# Patient Record
Sex: FEMALE | Race: WHITE | NOT HISPANIC OR LATINO | Employment: OTHER | URBAN - METROPOLITAN AREA
[De-identification: names, ages, dates, MRNs, and addresses within clinical notes are randomized per-mention and may not be internally consistent; named-entity substitution may affect disease eponyms.]

---

## 2023-02-27 LAB
ALANINE AMINOTRANSFERASE (SGPT) (U/L) IN SER/PLAS: 16 U/L (ref 7–45)
ALBUMIN (G/DL) IN SER/PLAS: 4.5 G/DL (ref 3.4–5)
ALKALINE PHOSPHATASE (U/L) IN SER/PLAS: 73 U/L (ref 33–136)
ANION GAP IN SER/PLAS: 15 MMOL/L (ref 10–20)
ASPARTATE AMINOTRANSFERASE (SGOT) (U/L) IN SER/PLAS: 20 U/L (ref 9–39)
BASOPHILS (10*3/UL) IN BLOOD BY AUTOMATED COUNT: 0.02 X10E9/L (ref 0–0.1)
BASOPHILS/100 LEUKOCYTES IN BLOOD BY AUTOMATED COUNT: 0.3 % (ref 0–2)
BILIRUBIN TOTAL (MG/DL) IN SER/PLAS: 0.4 MG/DL (ref 0–1.2)
CALCIUM (MG/DL) IN SER/PLAS: 8.9 MG/DL (ref 8.6–10.3)
CARBON DIOXIDE, TOTAL (MMOL/L) IN SER/PLAS: 26 MMOL/L (ref 21–32)
CHLORIDE (MMOL/L) IN SER/PLAS: 101 MMOL/L (ref 98–107)
CREATININE (MG/DL) IN SER/PLAS: 0.92 MG/DL (ref 0.5–1.05)
EOSINOPHILS (10*3/UL) IN BLOOD BY AUTOMATED COUNT: 0.02 X10E9/L (ref 0–0.4)
EOSINOPHILS/100 LEUKOCYTES IN BLOOD BY AUTOMATED COUNT: 0.3 % (ref 0–6)
ERYTHROCYTE DISTRIBUTION WIDTH (RATIO) BY AUTOMATED COUNT: 13.4 % (ref 11.5–14.5)
ERYTHROCYTE MEAN CORPUSCULAR HEMOGLOBIN CONCENTRATION (G/DL) BY AUTOMATED: 32 G/DL (ref 32–36)
ERYTHROCYTE MEAN CORPUSCULAR VOLUME (FL) BY AUTOMATED COUNT: 96 FL (ref 80–100)
ERYTHROCYTES (10*6/UL) IN BLOOD BY AUTOMATED COUNT: 4.48 X10E12/L (ref 4–5.2)
GFR FEMALE: 64 ML/MIN/1.73M2
GLUCOSE (MG/DL) IN SER/PLAS: 96 MG/DL (ref 74–99)
HEMATOCRIT (%) IN BLOOD BY AUTOMATED COUNT: 42.8 % (ref 36–46)
HEMOGLOBIN (G/DL) IN BLOOD: 13.7 G/DL (ref 12–16)
IMMATURE GRANULOCYTES/100 LEUKOCYTES IN BLOOD BY AUTOMATED COUNT: 0.3 % (ref 0–0.9)
LEUKOCYTES (10*3/UL) IN BLOOD BY AUTOMATED COUNT: 7.8 X10E9/L (ref 4.4–11.3)
LIPASE (U/L) IN SER/PLAS: 59 U/L (ref 9–82)
LYMPHOCYTES (10*3/UL) IN BLOOD BY AUTOMATED COUNT: 1.44 X10E9/L (ref 0.8–3)
LYMPHOCYTES/100 LEUKOCYTES IN BLOOD BY AUTOMATED COUNT: 18.4 % (ref 13–44)
MONOCYTES (10*3/UL) IN BLOOD BY AUTOMATED COUNT: 0.96 X10E9/L (ref 0.05–0.8)
MONOCYTES/100 LEUKOCYTES IN BLOOD BY AUTOMATED COUNT: 12.2 % (ref 2–10)
NEUTROPHILS (10*3/UL) IN BLOOD BY AUTOMATED COUNT: 5.38 X10E9/L (ref 1.6–5.5)
NEUTROPHILS/100 LEUKOCYTES IN BLOOD BY AUTOMATED COUNT: 68.5 % (ref 40–80)
PLATELETS (10*3/UL) IN BLOOD AUTOMATED COUNT: 209 X10E9/L (ref 150–450)
POTASSIUM (MMOL/L) IN SER/PLAS: 3.8 MMOL/L (ref 3.5–5.3)
PROTEIN TOTAL: 7.1 G/DL (ref 6.4–8.2)
SODIUM (MMOL/L) IN SER/PLAS: 138 MMOL/L (ref 136–145)
UREA NITROGEN (MG/DL) IN SER/PLAS: 23 MG/DL (ref 6–23)

## 2023-03-15 DIAGNOSIS — M79.10 GENERALIZED MUSCLE ACHE: Primary | ICD-10-CM

## 2023-03-15 RX ORDER — TIZANIDINE 4 MG/1
TABLET ORAL
Qty: 180 TABLET | Refills: 1 | Status: SHIPPED | OUTPATIENT
Start: 2023-03-15 | End: 2023-06-08 | Stop reason: ALTCHOICE

## 2023-04-05 RX ORDER — METOPROLOL SUCCINATE 100 MG/1
1 TABLET, EXTENDED RELEASE ORAL DAILY
COMMUNITY
Start: 2022-05-28

## 2023-04-05 RX ORDER — VENLAFAXINE 75 MG/1
1 TABLET ORAL DAILY
COMMUNITY
Start: 2022-05-16 | End: 2023-08-28 | Stop reason: ALTCHOICE

## 2023-04-05 RX ORDER — SEMAGLUTIDE 1.34 MG/ML
0.5 INJECTION, SOLUTION SUBCUTANEOUS
COMMUNITY
End: 2023-05-24 | Stop reason: SDUPTHER

## 2023-04-05 RX ORDER — LIDOCAINE 50 MG/G
1 PATCH TOPICAL EVERY 12 HOURS
COMMUNITY
End: 2023-06-08 | Stop reason: SDUPTHER

## 2023-04-05 RX ORDER — ROSUVASTATIN CALCIUM 40 MG/1
1 TABLET, COATED ORAL NIGHTLY
COMMUNITY
Start: 2016-10-15 | End: 2024-04-15

## 2023-04-05 RX ORDER — OMEPRAZOLE 40 MG/1
40 CAPSULE, DELAYED RELEASE ORAL 2 TIMES DAILY
COMMUNITY
End: 2024-01-31

## 2023-04-05 RX ORDER — QUETIAPINE FUMARATE 25 MG/1
TABLET, FILM COATED ORAL
COMMUNITY
Start: 2023-02-27 | End: 2023-06-08 | Stop reason: SDUPTHER

## 2023-04-11 ENCOUNTER — TELEPHONE (OUTPATIENT)
Dept: PRIMARY CARE | Facility: CLINIC | Age: 78
End: 2023-04-11
Payer: MEDICARE

## 2023-04-17 ENCOUNTER — APPOINTMENT (OUTPATIENT)
Dept: PRIMARY CARE | Facility: CLINIC | Age: 78
End: 2023-04-17
Payer: MEDICARE

## 2023-05-24 ENCOUNTER — TELEPHONE (OUTPATIENT)
Dept: PRIMARY CARE | Facility: CLINIC | Age: 78
End: 2023-05-24
Payer: MEDICARE

## 2023-05-24 DIAGNOSIS — E11.69 TYPE 2 DIABETES MELLITUS WITH OTHER SPECIFIED COMPLICATION, WITHOUT LONG-TERM CURRENT USE OF INSULIN (MULTI): Primary | ICD-10-CM

## 2023-05-24 RX ORDER — SEMAGLUTIDE 0.68 MG/ML
0.5 INJECTION, SOLUTION SUBCUTANEOUS
Qty: 9 ML | Refills: 1 | Status: SHIPPED | OUTPATIENT
Start: 2023-05-24 | End: 2023-12-23

## 2023-06-08 ENCOUNTER — OFFICE VISIT (OUTPATIENT)
Dept: PRIMARY CARE | Facility: CLINIC | Age: 78
End: 2023-06-08
Payer: MEDICARE

## 2023-06-08 VITALS
BODY MASS INDEX: 24.16 KG/M2 | OXYGEN SATURATION: 100 % | HEART RATE: 67 BPM | WEIGHT: 145 LBS | SYSTOLIC BLOOD PRESSURE: 130 MMHG | HEIGHT: 65 IN | DIASTOLIC BLOOD PRESSURE: 70 MMHG

## 2023-06-08 DIAGNOSIS — M54.50 CHRONIC LOW BACK PAIN WITHOUT SCIATICA, UNSPECIFIED BACK PAIN LATERALITY: ICD-10-CM

## 2023-06-08 DIAGNOSIS — G47.00 INSOMNIA, UNSPECIFIED TYPE: ICD-10-CM

## 2023-06-08 DIAGNOSIS — J30.9 ALLERGIC RHINITIS, UNSPECIFIED SEASONALITY, UNSPECIFIED TRIGGER: ICD-10-CM

## 2023-06-08 DIAGNOSIS — E11.8 CONTROLLED TYPE 2 DIABETES MELLITUS WITH COMPLICATION, WITHOUT LONG-TERM CURRENT USE OF INSULIN (MULTI): ICD-10-CM

## 2023-06-08 DIAGNOSIS — I10 BENIGN ESSENTIAL HYPERTENSION: Primary | ICD-10-CM

## 2023-06-08 DIAGNOSIS — F39 MOOD DISORDER (CMS-HCC): ICD-10-CM

## 2023-06-08 DIAGNOSIS — G89.29 CHRONIC LOW BACK PAIN WITHOUT SCIATICA, UNSPECIFIED BACK PAIN LATERALITY: ICD-10-CM

## 2023-06-08 DIAGNOSIS — Z00.00 ROUTINE GENERAL MEDICAL EXAMINATION AT HEALTH CARE FACILITY: ICD-10-CM

## 2023-06-08 PROBLEM — K21.9 GASTROESOPHAGEAL REFLUX DISEASE: Status: ACTIVE | Noted: 2018-07-16

## 2023-06-08 PROBLEM — E53.8 B12 DEFICIENCY: Status: ACTIVE | Noted: 2023-06-08

## 2023-06-08 PROBLEM — R55 SYNCOPE: Status: ACTIVE | Noted: 2023-06-08

## 2023-06-08 PROBLEM — E11.9 DIABETES MELLITUS TYPE 2, CONTROLLED (MULTI): Status: ACTIVE | Noted: 2023-06-08

## 2023-06-08 PROBLEM — E78.5 HYPERLIPEMIA: Status: ACTIVE | Noted: 2023-06-08

## 2023-06-08 PROBLEM — F32.A DEPRESSION: Status: ACTIVE | Noted: 2023-06-08

## 2023-06-08 PROBLEM — F41.9 ANXIETY: Status: ACTIVE | Noted: 2023-06-08

## 2023-06-08 PROBLEM — E55.9 VITAMIN D DEFICIENCY: Status: ACTIVE | Noted: 2023-06-08

## 2023-06-08 PROCEDURE — 1170F FXNL STATUS ASSESSED: CPT | Performed by: STUDENT IN AN ORGANIZED HEALTH CARE EDUCATION/TRAINING PROGRAM

## 2023-06-08 PROCEDURE — 3078F DIAST BP <80 MM HG: CPT | Performed by: STUDENT IN AN ORGANIZED HEALTH CARE EDUCATION/TRAINING PROGRAM

## 2023-06-08 PROCEDURE — 3075F SYST BP GE 130 - 139MM HG: CPT | Performed by: STUDENT IN AN ORGANIZED HEALTH CARE EDUCATION/TRAINING PROGRAM

## 2023-06-08 PROCEDURE — 99214 OFFICE O/P EST MOD 30 MIN: CPT | Performed by: STUDENT IN AN ORGANIZED HEALTH CARE EDUCATION/TRAINING PROGRAM

## 2023-06-08 PROCEDURE — 1159F MED LIST DOCD IN RCRD: CPT | Performed by: STUDENT IN AN ORGANIZED HEALTH CARE EDUCATION/TRAINING PROGRAM

## 2023-06-08 PROCEDURE — G0439 PPPS, SUBSEQ VISIT: HCPCS | Performed by: STUDENT IN AN ORGANIZED HEALTH CARE EDUCATION/TRAINING PROGRAM

## 2023-06-08 PROCEDURE — 1036F TOBACCO NON-USER: CPT | Performed by: STUDENT IN AN ORGANIZED HEALTH CARE EDUCATION/TRAINING PROGRAM

## 2023-06-08 RX ORDER — FLUTICASONE PROPIONATE 50 MCG
2 SPRAY, SUSPENSION (ML) NASAL DAILY
Qty: 16 G | Refills: 5 | Status: SHIPPED | OUTPATIENT
Start: 2023-06-08 | End: 2023-08-28 | Stop reason: ALTCHOICE

## 2023-06-08 RX ORDER — ESOMEPRAZOLE MAGNESIUM 40 MG/1
CAPSULE, DELAYED RELEASE ORAL
COMMUNITY
End: 2023-08-28 | Stop reason: ALTCHOICE

## 2023-06-08 RX ORDER — LIDOCAINE 50 MG/G
1 PATCH TOPICAL DAILY
Qty: 30 PATCH | Refills: 2 | Status: SHIPPED | OUTPATIENT
Start: 2023-06-08 | End: 2023-10-18 | Stop reason: SDUPTHER

## 2023-06-08 RX ORDER — IPRATROPIUM BROMIDE 21 UG/1
2 SPRAY, METERED NASAL 2 TIMES DAILY
COMMUNITY
Start: 2023-03-29 | End: 2023-08-28 | Stop reason: ALTCHOICE

## 2023-06-08 RX ORDER — QUETIAPINE FUMARATE 25 MG/1
TABLET, FILM COATED ORAL
Qty: 90 TABLET | Refills: 1 | Status: SHIPPED | OUTPATIENT
Start: 2023-06-08 | End: 2023-08-28 | Stop reason: SDUPTHER

## 2023-06-08 ASSESSMENT — ACTIVITIES OF DAILY LIVING (ADL)
DOING_HOUSEWORK: INDEPENDENT
BATHING: INDEPENDENT
GROCERY_SHOPPING: INDEPENDENT
TAKING_MEDICATION: INDEPENDENT
DRESSING: INDEPENDENT
MANAGING_FINANCES: INDEPENDENT

## 2023-06-08 ASSESSMENT — PATIENT HEALTH QUESTIONNAIRE - PHQ9
1. LITTLE INTEREST OR PLEASURE IN DOING THINGS: NOT AT ALL
SUM OF ALL RESPONSES TO PHQ9 QUESTIONS 1 AND 2: 0
2. FEELING DOWN, DEPRESSED OR HOPELESS: NOT AT ALL

## 2023-06-08 NOTE — PROGRESS NOTES
"Subjective   Reason for Visit: Nevaeh Fair is an 77 y.o. female here for a Medicare Wellness visit.                 Hip pain has subsided since she started getting joint injections, pain has resolved     Pt also reports not taking any of her Effexor as she feels she doesn't need them a         Patient Care Team:  Beba Montgomery,  as PCP - General  Beba Montgomery DO as PCP - [employer] PCP     Review of Systems    Objective   Vitals:  /70   Pulse 67   Ht 1.651 m (5' 5\")   Wt 65.8 kg (145 lb)   SpO2 100%   BMI 24.13 kg/m²       Physical Exam  Constitutional:       Appearance: Normal appearance.   Cardiovascular:      Rate and Rhythm: Normal rate and regular rhythm.      Heart sounds: No murmur heard.  Pulmonary:      Effort: Pulmonary effort is normal. No respiratory distress.      Breath sounds: Normal breath sounds. No wheezing.   Musculoskeletal:      Cervical back: Neck supple.      Left lower leg: No edema.   Neurological:      Mental Status: She is alert.         Assessment/Plan   Problem List Items Addressed This Visit       Benign essential hypertension - Primary    Current Assessment & Plan       Physical exam was stable. Discussed maintaining diets such as DASH to help maintain normal Blood pressure. Encouraged regular exercise for a total of 120 min weekly. We will fu labs in 1-3 days. For now there will be no change in medical management. All questions and concerns were addressed. Pt will follow up in 4-6 months or sooner if needed.              Diabetes mellitus type 2, controlled (CMS/HCC)    Current Assessment & Plan     A1c 5.7%  Doing well            Mood disorder (CMS/HCC)    Lumbago    Relevant Medications    lidocaine (Lidoderm) 5 % patch     Other Visit Diagnoses       Insomnia, unspecified type        Relevant Medications    QUEtiapine (SEROquel) 25 mg tablet    Allergic rhinitis, unspecified seasonality, unspecified trigger        Relevant Medications    fluticasone " (Flonase) 50 mcg/actuation nasal spray    Other Relevant Orders    Respiratory Allergy Profile IgE    Routine general medical examination at health care facility

## 2023-07-05 ENCOUNTER — LAB (OUTPATIENT)
Dept: LAB | Facility: LAB | Age: 78
End: 2023-07-05
Payer: MEDICARE

## 2023-07-05 DIAGNOSIS — J30.9 ALLERGIC RHINITIS, UNSPECIFIED SEASONALITY, UNSPECIFIED TRIGGER: ICD-10-CM

## 2023-07-05 PROCEDURE — 86003 ALLG SPEC IGE CRUDE XTRC EA: CPT

## 2023-07-05 PROCEDURE — 36415 COLL VENOUS BLD VENIPUNCTURE: CPT

## 2023-07-05 PROCEDURE — 82785 ASSAY OF IGE: CPT

## 2023-07-06 LAB
ALLERGEN ANIMAL: CAT DANDER IGE (KU/L): <0.1 KU/L
ALLERGEN ANIMAL: DOG DANDER IGE (KU/L): <0.1 KU/L
ALLERGEN GRASS: BERMUDA GRASS (CYNODON DACTYLON) IGE (KU/L): <0.1 KU/L
ALLERGEN GRASS: JOHNSON GRASS (SORGHUM HALEPENSE) IGE (KU/L): <0.1 KU/L
ALLERGEN GRASS: MEADOW GRASS, KENTUCKY BLUE (POA PRATENSIS )IGE (KU/L): <0.1 KU/L
ALLERGEN GRASS: TIMOTHY GRASS (PHLEUM PRATENSE) IGE (KU/L): <0.1 KU/L
ALLERGEN INSECT: COCKROACH IGE: <0.1 KU/L
ALLERGEN MICROORGANISM: ALTERNARIA ALTERNATA IGE (KU/L): <0.1 KU/L
ALLERGEN MICROORGANISM: ASPERGILLUS FUMIGATUS IGE (KU/L): <0.1 KU/L
ALLERGEN MICROORGANISM: CLADOSPORIUM HERBARUM IGE (KU/L): <0.1 KU/L
ALLERGEN MICROORGANISM: PENICILLIUM CHRYSOGENUM (P. NOTATUM) IGE (KU/L): <0.1 KU/L
ALLERGEN MITE: DERMATOPHAGOIDES FARINAE (HOUSE DUST MITE) IGE (KU/L): <0.1 KU/L
ALLERGEN MITE: DERMATOPHAGOIDES PTERONYSSINUS (HOUSE DUST MITE) IGE (KU/L): <0.1 KU/L
ALLERGEN TREE: BOX-ELDER (ACER NEGUNDO) IGE (KU/L): <0.1 KU/L
ALLERGEN TREE: COMMON SILVER BIRCH (BETULA VERRUCOSA) IGE (KU/L): <0.1 KU/L
ALLERGEN TREE: COTTONWOOD (POPULUS DELTOIDES) IGE (KU/L): <0.1 KU/L
ALLERGEN TREE: ELM (ULMUS AMERICANA) IGE (KU/L): <0.1 KU/L
ALLERGEN TREE: MAPLE LEAF SYCAMORE, LONDON PLANE IGE (KU/L): <0.1 KU/L
ALLERGEN TREE: MOUNTAIN JUNIPER (JUNIPERUS SABINOIDES) IGE (KU/L): <0.1 KU/L
ALLERGEN TREE: MULBERRY (MORUS ALBA) IGE (KU/L): <0.1 KU/L
ALLERGEN TREE: OAK (QUERCUS ALBA) IGE (KU/L): <0.1 KU/L
ALLERGEN TREE: PECAN, HICKORY (CARYA PECAN) IGE (KU/L): <0.1 KU/L
ALLERGEN TREE: WALNUT IGE: <0.1 KU/L
ALLERGEN TREE: WHITE ASH (FRAXINUS AMERICANA) IGE (KU/L): <0.1 KU/L
ALLERGEN WEED: COMMON PIGWEED (AMARANTHUS RETROFLEXUS) IGE (KU/L): <0.1 KU/L
ALLERGEN WEED: COMMON RAGWEED (AMB. ARTEMISIIFOLIA/A. ELATIOR) IGE (KU/L): <0.1 KU/L
ALLERGEN WEED: GOOSEFOOT, LAMB'S QUARTERS (CHENOPODIUM ALBUM) IGE (KU/L): <0.1 KU/L
ALLERGEN WEED: PLANTAIN (ENGLISH), RIBWORT (PLANTAGO LANCEOLATA) IGE (KU/L): <0.1 KU/L
ALLERGEN WEED: PRICKLY SALTWORT/RUSSIAN THISTLE (SALSOLA KALI) IGE (KU/L): <0.1 KU/L
ALLERGEN WEED: SHEEP SORREL (RUMEX ACETOSELLA) IGE (KU/L): <0.1 KU/L
IMMUNOCAP IGE: 276 KU/L (ref 0–214)
IMMUNOCAP INTERPRETATION: ABNORMAL

## 2023-08-21 PROBLEM — E78.2 HYPERLIPEMIA, MIXED: Status: ACTIVE | Noted: 2023-08-21

## 2023-08-21 PROBLEM — G47.00 INSOMNIA: Status: ACTIVE | Noted: 2023-08-21

## 2023-08-21 PROBLEM — R55 NEAR SYNCOPE: Status: ACTIVE | Noted: 2018-07-16

## 2023-08-21 PROBLEM — R40.0 SOMNOLENCE: Status: ACTIVE | Noted: 2018-07-19

## 2023-08-21 PROBLEM — M25.559 JOINT PAIN, HIP: Status: ACTIVE | Noted: 2023-08-21

## 2023-08-21 PROBLEM — G25.2 ACTION TREMOR: Status: ACTIVE | Noted: 2023-08-21

## 2023-08-21 PROBLEM — R79.89 ELEVATED LFTS: Status: ACTIVE | Noted: 2018-07-16

## 2023-08-21 PROBLEM — R31.9 HEMATURIA: Status: ACTIVE | Noted: 2023-08-21

## 2023-08-21 PROBLEM — R41.3 MEMORY LOSS: Status: ACTIVE | Noted: 2018-07-16

## 2023-08-21 PROBLEM — M89.9 BONE DISORDER: Status: ACTIVE | Noted: 2023-08-21

## 2023-08-21 PROBLEM — R47.9 TRANSIENT SPEECH DISTURBANCE: Status: ACTIVE | Noted: 2023-08-21

## 2023-08-21 PROBLEM — R11.0 NAUSEA: Status: ACTIVE | Noted: 2023-08-21

## 2023-08-21 PROBLEM — R30.0 DYSURIA: Status: ACTIVE | Noted: 2023-08-21

## 2023-08-21 PROBLEM — I25.10 ARTERIOSCLEROSIS OF CORONARY ARTERY: Status: ACTIVE | Noted: 2018-07-16

## 2023-08-21 PROBLEM — M54.16 ACUTE LUMBAR RADICULOPATHY: Status: ACTIVE | Noted: 2023-08-21

## 2023-08-21 PROBLEM — N32.81 OVERACTIVE BLADDER: Status: ACTIVE | Noted: 2023-08-21

## 2023-08-21 PROBLEM — N39.0 RECURRENT UTI (URINARY TRACT INFECTION): Status: ACTIVE | Noted: 2023-08-21

## 2023-08-21 PROBLEM — E66.3 OVERWEIGHT: Status: ACTIVE | Noted: 2018-07-16

## 2023-08-21 PROBLEM — M54.9 BACK PAIN: Status: ACTIVE | Noted: 2023-08-21

## 2023-08-21 PROBLEM — E78.00 HYPERCHOLESTEREMIA: Status: ACTIVE | Noted: 2018-07-16

## 2023-08-21 PROBLEM — R74.8 INCREASED LIVER ENZYMES: Status: ACTIVE | Noted: 2023-08-21

## 2023-08-21 PROBLEM — Z96.1 PSEUDOPHAKIA OF BOTH EYES: Status: ACTIVE | Noted: 2023-08-21

## 2023-08-21 PROBLEM — R29.6 RECURRENT FALLS: Status: ACTIVE | Noted: 2023-08-21

## 2023-08-21 PROBLEM — R41.82 CHANGE IN MENTAL STATE: Status: ACTIVE | Noted: 2018-10-12

## 2023-08-21 PROBLEM — R05.9 COUGH: Status: ACTIVE | Noted: 2023-08-21

## 2023-08-21 PROBLEM — I10 HTN, GOAL BELOW 140/90: Status: ACTIVE | Noted: 2018-07-16

## 2023-08-21 PROBLEM — G25.0 ESSENTIAL TREMOR: Status: ACTIVE | Noted: 2018-07-19

## 2023-08-21 PROBLEM — R63.5 ABNORMAL WEIGHT GAIN: Status: ACTIVE | Noted: 2023-08-21

## 2023-08-21 PROBLEM — R73.03 PREDIABETES: Status: ACTIVE | Noted: 2018-07-16

## 2023-08-21 PROBLEM — R73.09 IMPAIRED GLUCOSE REGULATION: Status: ACTIVE | Noted: 2023-08-21

## 2023-08-21 PROBLEM — D51.9 VITAMIN B12 DEFICIENCY ANEMIA: Status: ACTIVE | Noted: 2023-08-21

## 2023-08-21 PROBLEM — J32.9 SINUSITIS: Status: ACTIVE | Noted: 2023-08-21

## 2023-08-21 PROBLEM — R42 DIZZINESS: Status: ACTIVE | Noted: 2023-08-21

## 2023-08-21 PROBLEM — N28.9 FUNCTION KIDNEY DECREASED: Status: ACTIVE | Noted: 2023-08-21

## 2023-08-21 PROBLEM — J34.89 RHINORRHEA: Status: ACTIVE | Noted: 2023-08-21

## 2023-08-21 RX ORDER — NITROGLYCERIN 0.4 MG/1
TABLET SUBLINGUAL
COMMUNITY

## 2023-08-21 RX ORDER — CARISOPRODOL 350 MG/1
350 TABLET ORAL NIGHTLY PRN
COMMUNITY
End: 2023-08-28 | Stop reason: ALTCHOICE

## 2023-08-21 RX ORDER — TIZANIDINE HYDROCHLORIDE 4 MG/1
CAPSULE, GELATIN COATED ORAL
COMMUNITY
End: 2023-08-28 | Stop reason: ALTCHOICE

## 2023-08-22 ENCOUNTER — APPOINTMENT (OUTPATIENT)
Dept: PRIMARY CARE | Facility: CLINIC | Age: 78
End: 2023-08-22
Payer: MEDICARE

## 2023-08-24 ENCOUNTER — APPOINTMENT (OUTPATIENT)
Dept: PRIMARY CARE | Facility: CLINIC | Age: 78
End: 2023-08-24
Payer: MEDICARE

## 2023-08-28 ENCOUNTER — OFFICE VISIT (OUTPATIENT)
Dept: PRIMARY CARE | Facility: CLINIC | Age: 78
End: 2023-08-28
Payer: MEDICARE

## 2023-08-28 VITALS
RESPIRATION RATE: 16 BRPM | SYSTOLIC BLOOD PRESSURE: 100 MMHG | BODY MASS INDEX: 24.69 KG/M2 | DIASTOLIC BLOOD PRESSURE: 58 MMHG | HEIGHT: 65 IN | WEIGHT: 148.2 LBS | OXYGEN SATURATION: 95 % | HEART RATE: 58 BPM

## 2023-08-28 DIAGNOSIS — G47.00 INSOMNIA, UNSPECIFIED TYPE: ICD-10-CM

## 2023-08-28 DIAGNOSIS — J30.9 ALLERGIC RHINITIS, UNSPECIFIED SEASONALITY, UNSPECIFIED TRIGGER: Primary | ICD-10-CM

## 2023-08-28 PROCEDURE — 1159F MED LIST DOCD IN RCRD: CPT | Performed by: STUDENT IN AN ORGANIZED HEALTH CARE EDUCATION/TRAINING PROGRAM

## 2023-08-28 PROCEDURE — 3078F DIAST BP <80 MM HG: CPT | Performed by: STUDENT IN AN ORGANIZED HEALTH CARE EDUCATION/TRAINING PROGRAM

## 2023-08-28 PROCEDURE — 1036F TOBACCO NON-USER: CPT | Performed by: STUDENT IN AN ORGANIZED HEALTH CARE EDUCATION/TRAINING PROGRAM

## 2023-08-28 PROCEDURE — 1160F RVW MEDS BY RX/DR IN RCRD: CPT | Performed by: STUDENT IN AN ORGANIZED HEALTH CARE EDUCATION/TRAINING PROGRAM

## 2023-08-28 PROCEDURE — 99213 OFFICE O/P EST LOW 20 MIN: CPT | Performed by: STUDENT IN AN ORGANIZED HEALTH CARE EDUCATION/TRAINING PROGRAM

## 2023-08-28 PROCEDURE — 3074F SYST BP LT 130 MM HG: CPT | Performed by: STUDENT IN AN ORGANIZED HEALTH CARE EDUCATION/TRAINING PROGRAM

## 2023-08-28 RX ORDER — TRAMADOL HYDROCHLORIDE 50 MG/1
50 TABLET ORAL EVERY 8 HOURS PRN
COMMUNITY
Start: 2023-08-22

## 2023-08-28 RX ORDER — CETIRIZINE HYDROCHLORIDE 10 MG/1
10 TABLET ORAL 2 TIMES DAILY
Qty: 60 TABLET | Refills: 2 | Status: SHIPPED | OUTPATIENT
Start: 2023-08-28 | End: 2023-08-31

## 2023-08-28 RX ORDER — FLUTICASONE PROPIONATE 50 MCG
1 SPRAY, SUSPENSION (ML) NASAL DAILY
Qty: 16 G | Refills: 5 | Status: SHIPPED | OUTPATIENT
Start: 2023-08-28 | End: 2023-12-11 | Stop reason: WASHOUT

## 2023-08-28 RX ORDER — QUETIAPINE FUMARATE 25 MG/1
TABLET, FILM COATED ORAL
Qty: 90 TABLET | Refills: 1 | Status: SHIPPED | OUTPATIENT
Start: 2023-08-28 | End: 2023-10-03

## 2023-08-28 ASSESSMENT — PATIENT HEALTH QUESTIONNAIRE - PHQ9
1. LITTLE INTEREST OR PLEASURE IN DOING THINGS: NOT AT ALL
2. FEELING DOWN, DEPRESSED OR HOPELESS: NOT AT ALL
SUM OF ALL RESPONSES TO PHQ9 QUESTIONS 1 AND 2: 0

## 2023-08-28 NOTE — PROGRESS NOTES
Subjective   Patient ID: Nevaeh Fair is a 77 y.o. female who presents for Allergies (Pt is her to discuss her allergies.).  Patient presents today for further evaluation of her allergies.  Patient states that over the last couple months her allergies have been getting very bad.  She is having chronic rhinorrhea as well as congestion.  We have tried several antihistamines along with nasal sprays which have had little effect.  She has been taking 4 Benadryl daily without success as well.  We did do IgE testing as a respiratory panel which turned out to be very positive but no specific allergen was identified                  Objective   Physical Exam  Vitals reviewed.   Constitutional:       Appearance: Normal appearance.   Cardiovascular:      Rate and Rhythm: Normal rate and regular rhythm.      Heart sounds: No murmur heard.  Pulmonary:      Effort: Pulmonary effort is normal. No respiratory distress.      Breath sounds: Normal breath sounds. No wheezing.   Musculoskeletal:      Cervical back: Neck supple.      Left lower leg: No edema.   Neurological:      Mental Status: She is alert.       Assessment/Plan   Diagnoses and all orders for this visit:  Allergic rhinitis, unspecified seasonality, unspecified trigger  -     cetirizine (ZyrTEC) 10 mg tablet; Take 1 tablet (10 mg) by mouth 2 times a day.  -     fluticasone (Flonase) 50 mcg/actuation nasal spray; Administer 1 spray into each nostril once daily. Shake gently. Before first use, prime pump. After use, clean tip and replace cap.  Insomnia, unspecified type  -     QUEtiapine (SEROquel) 25 mg tablet; Use 2  tablet at night time

## 2023-08-31 DIAGNOSIS — J30.9 ALLERGIC RHINITIS, UNSPECIFIED SEASONALITY, UNSPECIFIED TRIGGER: ICD-10-CM

## 2023-08-31 RX ORDER — CETIRIZINE HYDROCHLORIDE 10 MG/1
10 TABLET ORAL 2 TIMES DAILY
Qty: 180 TABLET | Refills: 1 | Status: SHIPPED | OUTPATIENT
Start: 2023-08-31 | End: 2023-12-11 | Stop reason: ALTCHOICE

## 2023-09-27 ENCOUNTER — TELEPHONE (OUTPATIENT)
Dept: PRIMARY CARE | Facility: CLINIC | Age: 78
End: 2023-09-27
Payer: MEDICARE

## 2023-10-23 ENCOUNTER — TELEPHONE (OUTPATIENT)
Dept: ALLERGY | Facility: CLINIC | Age: 78
End: 2023-10-23
Payer: MEDICARE

## 2023-10-30 ENCOUNTER — APPOINTMENT (OUTPATIENT)
Dept: ALLERGY | Facility: CLINIC | Age: 78
End: 2023-10-30
Payer: MEDICARE

## 2023-11-03 ENCOUNTER — APPOINTMENT (OUTPATIENT)
Dept: ALLERGY | Facility: CLINIC | Age: 78
End: 2023-11-03
Payer: MEDICARE

## 2023-12-11 ENCOUNTER — OFFICE VISIT (OUTPATIENT)
Dept: PRIMARY CARE | Facility: CLINIC | Age: 78
End: 2023-12-11
Payer: MEDICARE

## 2023-12-11 VITALS
WEIGHT: 138 LBS | BODY MASS INDEX: 22.96 KG/M2 | HEART RATE: 70 BPM | OXYGEN SATURATION: 96 % | SYSTOLIC BLOOD PRESSURE: 110 MMHG | DIASTOLIC BLOOD PRESSURE: 56 MMHG

## 2023-12-11 DIAGNOSIS — M54.50 CHRONIC LOW BACK PAIN WITHOUT SCIATICA, UNSPECIFIED BACK PAIN LATERALITY: ICD-10-CM

## 2023-12-11 DIAGNOSIS — I10 HTN, GOAL BELOW 140/90: Primary | ICD-10-CM

## 2023-12-11 DIAGNOSIS — G89.29 CHRONIC LOW BACK PAIN WITHOUT SCIATICA, UNSPECIFIED BACK PAIN LATERALITY: ICD-10-CM

## 2023-12-11 PROBLEM — J32.9 SINUSITIS: Status: RESOLVED | Noted: 2023-08-21 | Resolved: 2023-12-11

## 2023-12-11 PROCEDURE — 99214 OFFICE O/P EST MOD 30 MIN: CPT | Performed by: STUDENT IN AN ORGANIZED HEALTH CARE EDUCATION/TRAINING PROGRAM

## 2023-12-11 PROCEDURE — 3074F SYST BP LT 130 MM HG: CPT | Performed by: STUDENT IN AN ORGANIZED HEALTH CARE EDUCATION/TRAINING PROGRAM

## 2023-12-11 PROCEDURE — 3078F DIAST BP <80 MM HG: CPT | Performed by: STUDENT IN AN ORGANIZED HEALTH CARE EDUCATION/TRAINING PROGRAM

## 2023-12-11 PROCEDURE — 1159F MED LIST DOCD IN RCRD: CPT | Performed by: STUDENT IN AN ORGANIZED HEALTH CARE EDUCATION/TRAINING PROGRAM

## 2023-12-11 PROCEDURE — 1160F RVW MEDS BY RX/DR IN RCRD: CPT | Performed by: STUDENT IN AN ORGANIZED HEALTH CARE EDUCATION/TRAINING PROGRAM

## 2023-12-11 PROCEDURE — 1036F TOBACCO NON-USER: CPT | Performed by: STUDENT IN AN ORGANIZED HEALTH CARE EDUCATION/TRAINING PROGRAM

## 2023-12-11 RX ORDER — LIDOCAINE 50 MG/G
1 PATCH TOPICAL DAILY
Qty: 30 PATCH | Refills: 0 | Status: SHIPPED | OUTPATIENT
Start: 2023-12-11 | End: 2024-01-31

## 2023-12-11 ASSESSMENT — PATIENT HEALTH QUESTIONNAIRE - PHQ9
2. FEELING DOWN, DEPRESSED OR HOPELESS: NOT AT ALL
SUM OF ALL RESPONSES TO PHQ9 QUESTIONS 1 AND 2: 0
1. LITTLE INTEREST OR PLEASURE IN DOING THINGS: NOT AT ALL

## 2023-12-11 NOTE — PROGRESS NOTES
Subjective   Patient ID: Nevaeh Fair is a 77 y.o. female who presents for Follow-up (Refill on lidocaine patches).    Pt is currently not taking her BP medications due to rhinorrhea.   Started taking a probiotic due to the intermittent nausea         Objective   Physical Exam  Vitals reviewed.   Constitutional:       Appearance: Normal appearance.   Cardiovascular:      Rate and Rhythm: Normal rate and regular rhythm.      Heart sounds: No murmur heard.  Pulmonary:      Effort: Pulmonary effort is normal. No respiratory distress.      Breath sounds: Normal breath sounds. No wheezing.   Musculoskeletal:      Cervical back: Neck supple.      Left lower leg: No edema.   Neurological:      Mental Status: She is alert.       Assessment/Plan   Problem List Items Addressed This Visit             ICD-10-CM    HTN, goal below 140/90 - Primary I10    Lumbago M54.50    Relevant Medications    lidocaine (Lidoderm) 5 % patch   Nevaeh Fair is a 77 year old female who presents to the office for 6 month follow up.       Type 2 Diabetes today  A1c 5.7%  worsening, start ozempic 0.5mg daily   sample given  - Recommended healthy diet- Mediterranean diet  - Recommended increased physical activity   - Limit alcohol consumption  - NO smoking   - Check your blood glucose readings before meals and at bedtime  - Please let us know if you have hypoglycemic symptoms such as tremor, dizziness, weakness, drowsiness, and confusion or altered mental status, palpitations and sweating and also blood sugar less than 70  - hemoglobin A1c trend is 5.8  - See ophthalmologist yearly base for retinopathy check   - Medications: aspirin 81 mg daily, rosuvastatin 40 mg daily, DC'd Metformin due to better A1c. She will continue with lifestyle modification        B12 Defiencey   severe fatigue and peripheral neuropathy   B12 1000mcg given in office      Rhinorrhea   start ipratropium BID      Anxiety/Insomnia/Depression -please follow Sleep hygiene  instruction  Venlafaxine 75mg daily   would llike to restart ramelteon 8mg at bedtime- rx sent   Sleep as long as necessary to feel rested (usually seven to eight hours for adults) and then get out of bed  Maintain a regular sleep schedule, particularly a regular wake-up time in the morning  Try not to force sleep  -refilled ramelteon 8mg daily   -Off Xanax and Valium     Hypertension/coronary artery disease and 3 stent on 2015  Pt self discontinued  metoprolol 50 daily   good control 110/56mmHg  - Sodium restriction 2400 mg per day  - seeing Cardiology- Dr. Reeves      Chronic Back pain 2/2 lumbar herniated disk   - s/p multiple injection  refilled tizanidine 4mg BID  - seeing orthopedist -Dr. Fuchs- low back pain, hip pain, has been getting shots hip/back  - lidocaine patch refilled     Essential tremor-not on medication  - Neurology- Dr. Ventura- tremor, memory loss- MRI, EEG done in 2018. She no longer follows up.  refilled tizanidine      GERD  refilled omeprazole 40mg daily       Health maintenance and Preventive Health Screening  - Colonoscopy:8/15/13 repeat 10 years, pt declines further testing  - Mammogram:Not interested  - Dexa Scan:Not interested               Beba Montgomery DO 12/11/23 1:05 PM

## 2024-01-09 DIAGNOSIS — G47.00 INSOMNIA, UNSPECIFIED TYPE: ICD-10-CM

## 2024-01-09 RX ORDER — QUETIAPINE FUMARATE 25 MG/1
TABLET, FILM COATED ORAL
Qty: 90 TABLET | Refills: 1 | Status: SHIPPED | OUTPATIENT
Start: 2024-01-09 | End: 2024-05-30

## 2024-01-16 ENCOUNTER — TELEPHONE (OUTPATIENT)
Dept: ALLERGY | Facility: CLINIC | Age: 79
End: 2024-01-16
Payer: MEDICARE

## 2024-01-23 ENCOUNTER — APPOINTMENT (OUTPATIENT)
Dept: ALLERGY | Facility: CLINIC | Age: 79
End: 2024-01-23
Payer: MEDICARE

## 2024-01-31 DIAGNOSIS — G47.00 INSOMNIA, UNSPECIFIED TYPE: ICD-10-CM

## 2024-01-31 DIAGNOSIS — M54.50 CHRONIC LOW BACK PAIN WITHOUT SCIATICA, UNSPECIFIED BACK PAIN LATERALITY: ICD-10-CM

## 2024-01-31 DIAGNOSIS — K21.9 GASTRO-ESOPHAGEAL REFLUX DISEASE WITHOUT ESOPHAGITIS: ICD-10-CM

## 2024-01-31 DIAGNOSIS — G89.29 CHRONIC LOW BACK PAIN WITHOUT SCIATICA, UNSPECIFIED BACK PAIN LATERALITY: ICD-10-CM

## 2024-01-31 RX ORDER — QUETIAPINE FUMARATE 25 MG/1
TABLET, FILM COATED ORAL
Qty: 180 TABLET | OUTPATIENT
Start: 2024-01-31

## 2024-01-31 RX ORDER — OMEPRAZOLE 40 MG/1
40 CAPSULE, DELAYED RELEASE ORAL 2 TIMES DAILY
Qty: 180 CAPSULE | Refills: 1 | Status: SHIPPED | OUTPATIENT
Start: 2024-01-31

## 2024-01-31 RX ORDER — LIDOCAINE 50 MG/G
PATCH TOPICAL
Qty: 30 PATCH | Refills: 3 | Status: SHIPPED | OUTPATIENT
Start: 2024-01-31 | End: 2024-02-01 | Stop reason: SDUPTHER

## 2024-02-05 RX ORDER — LIDOCAINE 50 MG/G
1 PATCH TOPICAL DAILY
Qty: 30 PATCH | Refills: 1 | Status: SHIPPED | OUTPATIENT
Start: 2024-02-05 | End: 2024-06-10 | Stop reason: WASHOUT

## 2024-02-08 ENCOUNTER — APPOINTMENT (OUTPATIENT)
Dept: PRIMARY CARE | Facility: CLINIC | Age: 79
End: 2024-02-08
Payer: MEDICARE

## 2024-04-09 ENCOUNTER — APPOINTMENT (OUTPATIENT)
Dept: PRIMARY CARE | Facility: CLINIC | Age: 79
End: 2024-04-09
Payer: MEDICARE

## 2024-04-09 ENCOUNTER — TELEPHONE (OUTPATIENT)
Dept: PRIMARY CARE | Facility: CLINIC | Age: 79
End: 2024-04-09

## 2024-04-10 ENCOUNTER — TELEPHONE (OUTPATIENT)
Dept: PRIMARY CARE | Facility: CLINIC | Age: 79
End: 2024-04-10
Payer: MEDICARE

## 2024-04-15 DIAGNOSIS — E78.2 MIXED HYPERLIPIDEMIA: ICD-10-CM

## 2024-04-15 RX ORDER — ROSUVASTATIN CALCIUM 40 MG/1
40 TABLET, COATED ORAL NIGHTLY
Qty: 90 TABLET | Refills: 0 | Status: SHIPPED | OUTPATIENT
Start: 2024-04-15

## 2024-04-18 DIAGNOSIS — E11.69 TYPE 2 DIABETES MELLITUS WITH OTHER SPECIFIED COMPLICATION, WITHOUT LONG-TERM CURRENT USE OF INSULIN (MULTI): ICD-10-CM

## 2024-04-18 RX ORDER — SEMAGLUTIDE 0.68 MG/ML
0.5 INJECTION, SOLUTION SUBCUTANEOUS
Qty: 9 ML | Refills: 1 | Status: SHIPPED | OUTPATIENT
Start: 2024-04-21

## 2024-05-30 DIAGNOSIS — G47.00 INSOMNIA, UNSPECIFIED TYPE: ICD-10-CM

## 2024-05-30 RX ORDER — QUETIAPINE FUMARATE 25 MG/1
TABLET, FILM COATED ORAL
Qty: 90 TABLET | Refills: 1 | Status: SHIPPED | OUTPATIENT
Start: 2024-05-30 | End: 2024-06-10 | Stop reason: DRUGHIGH

## 2024-06-10 ENCOUNTER — OFFICE VISIT (OUTPATIENT)
Dept: PRIMARY CARE | Facility: CLINIC | Age: 79
End: 2024-06-10
Payer: MEDICARE

## 2024-06-10 VITALS
HEART RATE: 55 BPM | WEIGHT: 144.4 LBS | RESPIRATION RATE: 15 BRPM | BODY MASS INDEX: 24.06 KG/M2 | HEIGHT: 65 IN | OXYGEN SATURATION: 99 %

## 2024-06-10 DIAGNOSIS — H35.3231 EXUDATIVE AGE-RELATED MACULAR DEGENERATION, BILATERAL, WITH ACTIVE CHOROIDAL NEOVASCULARIZATION (MULTI): ICD-10-CM

## 2024-06-10 DIAGNOSIS — E74.89 DISORDER OF GLUCOSE METABOLISM (MULTI): ICD-10-CM

## 2024-06-10 DIAGNOSIS — I10 HTN, GOAL BELOW 140/90: Primary | ICD-10-CM

## 2024-06-10 DIAGNOSIS — G47.00 INSOMNIA, UNSPECIFIED TYPE: ICD-10-CM

## 2024-06-10 DIAGNOSIS — F39 MOOD DISORDER (CMS-HCC): ICD-10-CM

## 2024-06-10 DIAGNOSIS — E11.8 CONTROLLED TYPE 2 DIABETES MELLITUS WITH COMPLICATION, WITHOUT LONG-TERM CURRENT USE OF INSULIN (MULTI): ICD-10-CM

## 2024-06-10 PROCEDURE — 1160F RVW MEDS BY RX/DR IN RCRD: CPT | Performed by: STUDENT IN AN ORGANIZED HEALTH CARE EDUCATION/TRAINING PROGRAM

## 2024-06-10 PROCEDURE — 99214 OFFICE O/P EST MOD 30 MIN: CPT | Performed by: STUDENT IN AN ORGANIZED HEALTH CARE EDUCATION/TRAINING PROGRAM

## 2024-06-10 PROCEDURE — 1123F ACP DISCUSS/DSCN MKR DOCD: CPT | Performed by: STUDENT IN AN ORGANIZED HEALTH CARE EDUCATION/TRAINING PROGRAM

## 2024-06-10 PROCEDURE — 1159F MED LIST DOCD IN RCRD: CPT | Performed by: STUDENT IN AN ORGANIZED HEALTH CARE EDUCATION/TRAINING PROGRAM

## 2024-06-10 PROCEDURE — G2211 COMPLEX E/M VISIT ADD ON: HCPCS | Performed by: STUDENT IN AN ORGANIZED HEALTH CARE EDUCATION/TRAINING PROGRAM

## 2024-06-10 RX ORDER — QUETIAPINE FUMARATE 50 MG/1
50 TABLET, FILM COATED ORAL NIGHTLY
Qty: 90 TABLET | Refills: 1 | Status: SHIPPED | OUTPATIENT
Start: 2024-06-10 | End: 2024-12-07

## 2024-06-10 NOTE — PROGRESS NOTES
"Subjective   Patient ID: Nevaeh Fair is a 78 y.o. female who presents for Follow-up (Pt is here for her 3 month follow up. /74.).  HPI     Interval Hx:  Pt stopped her metoprolol medications 4-5 months ago due to rhinorrhea. She followed up with cardiologist last week but did not inform them.   EKG and carotid artery screenings were stable.   Stress testing pending    Pt admits to having were jaw feeling \"funny\" feeling were she wants to open her jaw     Back Pain  Followed with Dr. Alvarado   S/p facet block ablation   S/p nsaids, tylenol, neuropathic agents, muscle relaxants and physical therapy or spinal manipulation Trials   Planning for possible ablation     Objective   Physical Exam  Vitals reviewed.   Constitutional:       Appearance: Normal appearance.   Cardiovascular:      Rate and Rhythm: Normal rate and regular rhythm.      Heart sounds: No murmur heard.  Pulmonary:      Effort: Pulmonary effort is normal. No respiratory distress.      Breath sounds: Normal breath sounds. No wheezing.   Musculoskeletal:      Cervical back: Neck supple.      Left lower leg: No edema.   Neurological:      Mental Status: She is alert.         Assessment/Plan   Nevaeh Fair is a 78 year old female who presents to the office for 6 month follow up.       Type 2 Diabetes today  A1C ordered  - Recommended healthy diet- Mediterranean diet  - Recommended increased physical activity   - Limit alcohol consumption  - NO smoking   - Check your blood glucose readings before meals and at bedtime  - Please let us know if you have hypoglycemic symptoms such as tremor, dizziness, weakness, drowsiness, and confusion or altered mental status, palpitations and sweating and also blood sugar less than 70  - See ophthalmologist yearly base for retinopathy check   - Medications: aspirin 81 mg daily, rosuvastatin 40 mg daily, DC'd Metformin due to better A1c. She will continue with lifestyle modification     B12 Defiencey   severe fatigue " and peripheral neuropathy   B12 1000mcg supplementation     Rhinorrhea   BB related. Pt self stopped her BP medications   Resolved      Anxiety/Insomnia/Depression -please follow Sleep hygiene instruction  Venlafaxine 75mg daily   Sleep as long as necessary to feel rested (usually seven to eight hours for adults) and then get out of bed  Maintain a regular sleep schedule, particularly a regular wake-up time in the morning  Try not to force sleep  -Seroquel 50mg nightly for her insomnia   -Previous medication, remelteon, Xanax and Valium     Hypertension/coronary artery disease and 3 stent on 2015  Pt self discontinued  metoprolol 50 daily   good control 110/56mmHg  - Sodium restriction 2400 mg per day  - seeing Cardiology- Dr. Reeves      Chronic Back pain 2/2 lumbar herniated disk   - s/p multiple injection  refilled tizanidine 4mg BID  - seeing orthopedist -Dr. Fuchs- low back pain, hip pain, has been getting shots hip/back  - lidocaine patch refilled     Essential tremor-not on medication  - Neurology- Dr. Ventura- tremor, memory loss- MRI, EEG done in 2018. She no longer follows up.  refilled tizanidine      GERD  refilled omeprazole 40mg daily       Health maintenance and Preventive Health Screening  - Colonoscopy:8/15/13 repeat 10 years, pt declines further testing  - Mammogram:Not interested  - Dexa Scan:Not interested       Beba Montgomery DO 06/10/24 12:42 PM

## 2024-06-18 ENCOUNTER — LAB (OUTPATIENT)
Dept: LAB | Facility: LAB | Age: 79
End: 2024-06-18
Payer: MEDICARE

## 2024-06-18 ENCOUNTER — OFFICE VISIT (OUTPATIENT)
Dept: PRIMARY CARE | Facility: CLINIC | Age: 79
End: 2024-06-18
Payer: MEDICARE

## 2024-06-18 VITALS
SYSTOLIC BLOOD PRESSURE: 128 MMHG | WEIGHT: 145.6 LBS | HEIGHT: 65 IN | OXYGEN SATURATION: 98 % | RESPIRATION RATE: 16 BRPM | BODY MASS INDEX: 24.26 KG/M2 | HEART RATE: 57 BPM | DIASTOLIC BLOOD PRESSURE: 70 MMHG

## 2024-06-18 DIAGNOSIS — E11.8 CONTROLLED TYPE 2 DIABETES MELLITUS WITH COMPLICATION, WITHOUT LONG-TERM CURRENT USE OF INSULIN (MULTI): ICD-10-CM

## 2024-06-18 DIAGNOSIS — R10.9 ABDOMINAL CRAMPING: Primary | ICD-10-CM

## 2024-06-18 DIAGNOSIS — I10 HTN, GOAL BELOW 140/90: ICD-10-CM

## 2024-06-18 LAB
ALBUMIN SERPL BCP-MCNC: 4.1 G/DL (ref 3.4–5)
ALP SERPL-CCNC: 75 U/L (ref 33–136)
ALT SERPL W P-5'-P-CCNC: 24 U/L (ref 7–45)
ANION GAP SERPL CALC-SCNC: 14 MMOL/L (ref 10–20)
AST SERPL W P-5'-P-CCNC: 15 U/L (ref 9–39)
BASOPHILS # BLD AUTO: 0.03 X10*3/UL (ref 0–0.1)
BASOPHILS NFR BLD AUTO: 0.4 %
BILIRUB SERPL-MCNC: 0.5 MG/DL (ref 0–1.2)
BUN SERPL-MCNC: 22 MG/DL (ref 6–23)
CALCIUM SERPL-MCNC: 8.9 MG/DL (ref 8.6–10.3)
CHLORIDE SERPL-SCNC: 106 MMOL/L (ref 98–107)
CO2 SERPL-SCNC: 26 MMOL/L (ref 21–32)
CREAT SERPL-MCNC: 0.7 MG/DL (ref 0.5–1.05)
EGFRCR SERPLBLD CKD-EPI 2021: 88 ML/MIN/1.73M*2
EOSINOPHIL # BLD AUTO: 0.06 X10*3/UL (ref 0–0.4)
EOSINOPHIL NFR BLD AUTO: 0.9 %
ERYTHROCYTE [DISTWIDTH] IN BLOOD BY AUTOMATED COUNT: 14.6 % (ref 11.5–14.5)
GLUCOSE SERPL-MCNC: 94 MG/DL (ref 74–99)
HCT VFR BLD AUTO: 41.1 % (ref 36–46)
HGB BLD-MCNC: 13.3 G/DL (ref 12–16)
IMM GRANULOCYTES # BLD AUTO: 0.02 X10*3/UL (ref 0–0.5)
IMM GRANULOCYTES NFR BLD AUTO: 0.3 % (ref 0–0.9)
LYMPHOCYTES # BLD AUTO: 2.78 X10*3/UL (ref 0.8–3)
LYMPHOCYTES NFR BLD AUTO: 39.5 %
MCH RBC QN AUTO: 31.4 PG (ref 26–34)
MCHC RBC AUTO-ENTMCNC: 32.4 G/DL (ref 32–36)
MCV RBC AUTO: 97 FL (ref 80–100)
MONOCYTES # BLD AUTO: 0.41 X10*3/UL (ref 0.05–0.8)
MONOCYTES NFR BLD AUTO: 5.8 %
NEUTROPHILS # BLD AUTO: 3.73 X10*3/UL (ref 1.6–5.5)
NEUTROPHILS NFR BLD AUTO: 53.1 %
PLATELET # BLD AUTO: 173 X10*3/UL (ref 150–450)
PMV BLD AUTO: 9.2 FL (ref 7.5–11.5)
POTASSIUM SERPL-SCNC: 3.8 MMOL/L (ref 3.5–5.3)
PROT SERPL-MCNC: 6.5 G/DL (ref 6.4–8.2)
RBC # BLD AUTO: 4.23 X10*6/UL (ref 4–5.2)
SODIUM SERPL-SCNC: 142 MMOL/L (ref 136–145)
TSH SERPL-ACNC: 1.04 MIU/L (ref 0.44–3.98)
WBC # BLD AUTO: 7 X10*3/UL (ref 4.4–11.3)

## 2024-06-18 PROCEDURE — 1159F MED LIST DOCD IN RCRD: CPT | Performed by: STUDENT IN AN ORGANIZED HEALTH CARE EDUCATION/TRAINING PROGRAM

## 2024-06-18 PROCEDURE — 84443 ASSAY THYROID STIM HORMONE: CPT

## 2024-06-18 PROCEDURE — 1123F ACP DISCUSS/DSCN MKR DOCD: CPT | Performed by: STUDENT IN AN ORGANIZED HEALTH CARE EDUCATION/TRAINING PROGRAM

## 2024-06-18 PROCEDURE — 3078F DIAST BP <80 MM HG: CPT | Performed by: STUDENT IN AN ORGANIZED HEALTH CARE EDUCATION/TRAINING PROGRAM

## 2024-06-18 PROCEDURE — 3074F SYST BP LT 130 MM HG: CPT | Performed by: STUDENT IN AN ORGANIZED HEALTH CARE EDUCATION/TRAINING PROGRAM

## 2024-06-18 PROCEDURE — 83036 HEMOGLOBIN GLYCOSYLATED A1C: CPT

## 2024-06-18 PROCEDURE — 99213 OFFICE O/P EST LOW 20 MIN: CPT | Performed by: STUDENT IN AN ORGANIZED HEALTH CARE EDUCATION/TRAINING PROGRAM

## 2024-06-18 PROCEDURE — 80053 COMPREHEN METABOLIC PANEL: CPT

## 2024-06-18 PROCEDURE — 85025 COMPLETE CBC W/AUTO DIFF WBC: CPT

## 2024-06-18 PROCEDURE — 1036F TOBACCO NON-USER: CPT | Performed by: STUDENT IN AN ORGANIZED HEALTH CARE EDUCATION/TRAINING PROGRAM

## 2024-06-18 PROCEDURE — 36415 COLL VENOUS BLD VENIPUNCTURE: CPT

## 2024-06-18 RX ORDER — DICYCLOMINE HYDROCHLORIDE 10 MG/1
10 CAPSULE ORAL 4 TIMES DAILY PRN
Qty: 30 CAPSULE | Refills: 0 | Status: SHIPPED | OUTPATIENT
Start: 2024-06-18 | End: 2024-08-17

## 2024-06-18 ASSESSMENT — ENCOUNTER SYMPTOMS: ABDOMINAL PAIN: 1

## 2024-06-18 NOTE — H&P (VIEW-ONLY)
Subjective   Patient ID: Nevaeh Fair is a 78 y.o. female who presents for Abdominal Pain (Pt is here for stomach pain for the last couple of weeks.).    Thought she was lactose intolerant   She ate steak, she gets severe abdominal pain vs.     Pt is having anginal symptoms relieved with her nitroglycerin   Central sternal pain   Has happened twice       Abdominal Pain  This is a new problem. The current episode started 1 to 4 weeks ago. The onset quality is sudden. The problem occurs daily. The problem has been gradually worsening. The pain is moderate. The quality of the pain is colicky.       Review of Systems   Gastrointestinal:  Positive for abdominal pain.       Objective   Physical Exam  Vitals reviewed.   Constitutional:       Appearance: Normal appearance.   Cardiovascular:      Rate and Rhythm: Normal rate and regular rhythm.      Heart sounds: No murmur heard.  Pulmonary:      Effort: Pulmonary effort is normal. No respiratory distress.      Breath sounds: Normal breath sounds. No wheezing.   Musculoskeletal:      Cervical back: Neck supple.      Left lower leg: No edema.   Neurological:      Mental Status: She is alert.         Assessment/Plan   Abdominal Pain   Colicky pain with food making it worse   Bentyl with food   H.pylori testing   Consider starting FODMAP diet     RTC in 1-2 months if symptoms persist or worsen as needed        Beba Montgomery, DO 06/18/24 3:03 PM

## 2024-06-19 LAB
EST. AVERAGE GLUCOSE BLD GHB EST-MCNC: 128 MG/DL
HBA1C MFR BLD: 6.1 %

## 2024-06-29 PROBLEM — H35.3231 EXUDATIVE AGE-RELATED MACULAR DEGENERATION, BILATERAL, WITH ACTIVE CHOROIDAL NEOVASCULARIZATION (MULTI): Status: ACTIVE | Noted: 2024-06-29

## 2024-06-29 PROBLEM — E74.89 DISORDER OF GLUCOSE METABOLISM (MULTI): Status: ACTIVE | Noted: 2023-08-21

## 2024-07-01 DIAGNOSIS — R10.9 ABDOMINAL CRAMPING: ICD-10-CM

## 2024-07-01 RX ORDER — DICYCLOMINE HYDROCHLORIDE 10 MG/1
10 CAPSULE ORAL 4 TIMES DAILY PRN
Qty: 30 CAPSULE | Refills: 0 | Status: SHIPPED | OUTPATIENT
Start: 2024-07-01 | End: 2024-08-30

## 2024-07-10 ENCOUNTER — HOSPITAL ENCOUNTER (OUTPATIENT)
Facility: HOSPITAL | Age: 79
Setting detail: OUTPATIENT SURGERY
Discharge: HOME | End: 2024-07-10
Attending: INTERNAL MEDICINE | Admitting: INTERNAL MEDICINE
Payer: MEDICARE

## 2024-07-10 VITALS
DIASTOLIC BLOOD PRESSURE: 70 MMHG | HEART RATE: 68 BPM | HEIGHT: 65 IN | OXYGEN SATURATION: 98 % | WEIGHT: 145 LBS | SYSTOLIC BLOOD PRESSURE: 145 MMHG | RESPIRATION RATE: 14 BRPM | BODY MASS INDEX: 24.16 KG/M2

## 2024-07-10 DIAGNOSIS — Z98.61 HISTORY OF CORONARY ANGIOPLASTY: ICD-10-CM

## 2024-07-10 DIAGNOSIS — I25.119 ATHEROSCLEROSIS OF NATIVE CORONARY ARTERY OF NATIVE HEART WITH ANGINA PECTORIS (CMS-HCC): ICD-10-CM

## 2024-07-10 PROCEDURE — 99153 MOD SED SAME PHYS/QHP EA: CPT | Performed by: INTERNAL MEDICINE

## 2024-07-10 PROCEDURE — 93458 L HRT ARTERY/VENTRICLE ANGIO: CPT | Performed by: INTERNAL MEDICINE

## 2024-07-10 PROCEDURE — 99152 MOD SED SAME PHYS/QHP 5/>YRS: CPT | Performed by: INTERNAL MEDICINE

## 2024-07-10 PROCEDURE — C1894 INTRO/SHEATH, NON-LASER: HCPCS | Performed by: INTERNAL MEDICINE

## 2024-07-10 PROCEDURE — 2780000003 HC OR 278 NO HCPCS: Performed by: INTERNAL MEDICINE

## 2024-07-10 PROCEDURE — G0269 OCCLUSIVE DEVICE IN VEIN ART: HCPCS | Mod: 59 | Performed by: INTERNAL MEDICINE

## 2024-07-10 PROCEDURE — 2500000004 HC RX 250 GENERAL PHARMACY W/ HCPCS (ALT 636 FOR OP/ED): Performed by: INTERNAL MEDICINE

## 2024-07-10 PROCEDURE — C1760 CLOSURE DEV, VASC: HCPCS | Performed by: INTERNAL MEDICINE

## 2024-07-10 PROCEDURE — 7100000009 HC PHASE TWO TIME - INITIAL BASE CHARGE: Performed by: INTERNAL MEDICINE

## 2024-07-10 PROCEDURE — 7100000010 HC PHASE TWO TIME - EACH INCREMENTAL 1 MINUTE: Performed by: INTERNAL MEDICINE

## 2024-07-10 PROCEDURE — 2500000005 HC RX 250 GENERAL PHARMACY W/O HCPCS: Performed by: INTERNAL MEDICINE

## 2024-07-10 PROCEDURE — 2720000007 HC OR 272 NO HCPCS: Performed by: INTERNAL MEDICINE

## 2024-07-10 RX ORDER — LIDOCAINE HYDROCHLORIDE 10 MG/ML
INJECTION, SOLUTION EPIDURAL; INFILTRATION; INTRACAUDAL; PERINEURAL AS NEEDED
Status: DISCONTINUED | OUTPATIENT
Start: 2024-07-10 | End: 2024-07-10 | Stop reason: HOSPADM

## 2024-07-10 RX ORDER — MIDAZOLAM HYDROCHLORIDE 1 MG/ML
INJECTION, SOLUTION INTRAMUSCULAR; INTRAVENOUS AS NEEDED
Status: DISCONTINUED | OUTPATIENT
Start: 2024-07-10 | End: 2024-07-10 | Stop reason: HOSPADM

## 2024-07-10 RX ORDER — FENTANYL CITRATE 50 UG/ML
INJECTION, SOLUTION INTRAMUSCULAR; INTRAVENOUS AS NEEDED
Status: DISCONTINUED | OUTPATIENT
Start: 2024-07-10 | End: 2024-07-10 | Stop reason: HOSPADM

## 2024-07-10 RX ORDER — SODIUM CHLORIDE 9 MG/ML
INJECTION, SOLUTION INTRAVENOUS CONTINUOUS PRN
Status: DISCONTINUED | OUTPATIENT
Start: 2024-07-10 | End: 2024-07-10 | Stop reason: HOSPADM

## 2024-07-10 ASSESSMENT — PAIN SCALES - GENERAL
PAINLEVEL_OUTOF10: 0 - NO PAIN
PAINLEVEL_OUTOF10: 0 - NO PAIN

## 2024-07-10 ASSESSMENT — COLUMBIA-SUICIDE SEVERITY RATING SCALE - C-SSRS
1. IN THE PAST MONTH, HAVE YOU WISHED YOU WERE DEAD OR WISHED YOU COULD GO TO SLEEP AND NOT WAKE UP?: NO
6. HAVE YOU EVER DONE ANYTHING, STARTED TO DO ANYTHING, OR PREPARED TO DO ANYTHING TO END YOUR LIFE?: NO
2. HAVE YOU ACTUALLY HAD ANY THOUGHTS OF KILLING YOURSELF?: NO

## 2024-07-10 ASSESSMENT — PAIN - FUNCTIONAL ASSESSMENT
PAIN_FUNCTIONAL_ASSESSMENT: 0-10
PAIN_FUNCTIONAL_ASSESSMENT: 0-10

## 2024-07-10 NOTE — H&P
History Of Present Illness  Nevaeh Fair is a 78 y.o. female presenting with angina.     Past Medical History  Past Medical History:   Diagnosis Date    Coronary artery disease     Familial hypercholesterolemia 04/17/2014    Essential familial hypercholesterolemia    Hypertension     Presence of intraocular lens 07/10/2014    Pseudophakia, left eye       Surgical History  Past Surgical History:   Procedure Laterality Date    CATARACT EXTRACTION  07/10/2014    Cataract Surgery    OTHER SURGICAL HISTORY  11/13/2019    Colonoscopy        Social History  She reports that she has quit smoking. Her smoking use included cigarettes. She has never used smokeless tobacco. She reports that she does not drink alcohol and does not use drugs.    Family History  Family History   Problem Relation Name Age of Onset    Heart disease Mother      Coronary artery disease Mother      Heart disease Father      Heart attack Father      Hypertension Sister          Allergies  Patient has no known allergies.    Review of Systems   Comprehensive 10-point review of systems negative otherwise as noted above in HPI    Physical Exam   Constitutional: Well developed, awake/alert/oriented x3, no distress, alert and cooperative  Eyes: PERRL, EOMI, clear sclera  ENMT: mucous membranes moist, no apparent injury, no lesions seen  Head/Neck: Neck supple, no apparent injury, thyroid without mass or tenderness, No JVD, trachea midline, no bruits  Respiratory/Thorax: Patent airways, CTAB, normal breath sounds with good chest expansion, thorax symmetric  Cardiovascular: Regular, rate and rhythm, no murmurs, 2+ equal pulses of the extremities, normal S 1and S 2  Gastrointestinal: Nondistended, soft, non-tender, no rebound tenderness or guarding, no masses palpable, no organomegaly, +BS, no bruits  Musculoskeletal: ROM intact, no joint swelling, normal strength  Extremities: normal extremities, no cyanosis edema, contusions or wounds, no  "clubbing  Neurological: alert and oriented x3, intact senses, motor, response and reflexes, normal strength  Lymphatic: No significant lymphadenopathy  Psychological: Appropriate mood and behavior  Skin: Warm and dry, no lesions, no rashes    Last Recorded Vitals  Blood pressure 167/78, pulse 78, resp. rate 22, height 1.651 m (5' 5\"), weight 65.8 kg (145 lb), SpO2 98%.    Relevant Results        See office notes for details     Assessment/Plan   Active Problems:  There are no active Hospital Problems.      Consent obtained for coronary angiography       I spent 20 minutes in the professional and overall care of this patient.      Aditya Trejo MD    "

## 2024-07-10 NOTE — POST-PROCEDURE NOTE
Physician Transition of Care Summary  Invasive Cardiovascular Lab    Procedure Date: 7/10/2024  Attending:    * Aditya Trejo - Primary  Resident/Fellow/Other Assistant: Surgeons and Role:  * No surgeons found with a matching role *    Indications:   Pre-op Diagnosis      * Atherosclerosis of native coronary artery of native heart with angina pectoris (CMS-[facility]) [I25.119]     * History of coronary angioplasty [Z98.61]    Post-procedure diagnosis:   Post-op Diagnosis     * Atherosclerosis of native coronary artery of native heart with angina pectoris (CMS-[facility]) [I25.119]     * History of coronary angioplasty [Z98.61]    Procedure(s):   Left Heart Cath with Coronary Angiography and LV  [id number] - NE CATH PLMT L HRT & ARTS W/NJX & ANGIO IMG S&I        Procedure Findings:   Severe LAD instent restenosis, severe ostial dominant circumflex, moderate proximal circumflex, severe ramus disease    Description of the Procedure:   [facility]    Complications:   NONE    Stents/Implants:   Implants       No implant documentation for this case.            Anticoagulation/Antiplatelet Plan:   ASA    Estimated Blood Loss:   * No values recorded between 7/10/2024  9:30 AM and 7/10/2024 10:10 AM *    Anesthesia: Moderate Sedation Anesthesia Staff: No anesthesia staff entered.    Any Specimen(s) Removed:   No specimens collected during this procedure.    Disposition:   REFER FOR CABG      Electronically signed by: Aditya Trejo MD, 7/10/2024 10:10 AM

## 2024-07-10 NOTE — SIGNIFICANT EVENT
Pt returned to [facility] 5 from lab 1, right femoral MYNX in place, pt resting comfortably, no c/o at this time

## 2024-07-11 NOTE — SIGNIFICANT EVENT
Cath Lab Procedure Call Back  Procedure Date: __7/10/2024_____________   Procedure Performed:______LHC______________  Physician:__Dr. Krishnan_____________  Spoke with:_________left message____________________  Date/Time Contacted: 1st attempt: _____9:57____ ___:____ 2nd attempt: _________ ___:____  Phone#:_______________ Unable to reach/Left message:_____X_______  Access Site: Pain______Bleeding______Bruised______Infection______WNL______  Dressing Removal Date:___7/11/2024___________ Anticoagulation Post Intervention_________  Satisfied with Care:________________ D/C Instructions adequate_______________  Follow Up Appointment:_____________________ Length of Call:______2mins____________  Comments:________Unable to reach the pt, so I left a message. ______________________________________________________________________________________________________________________________________

## 2024-07-15 ENCOUNTER — OFFICE VISIT (OUTPATIENT)
Dept: CARDIAC SURGERY | Facility: HOSPITAL | Age: 79
End: 2024-07-15
Payer: MEDICARE

## 2024-07-15 VITALS
OXYGEN SATURATION: 98 % | WEIGHT: 149.4 LBS | HEART RATE: 82 BPM | BODY MASS INDEX: 24.89 KG/M2 | SYSTOLIC BLOOD PRESSURE: 110 MMHG | DIASTOLIC BLOOD PRESSURE: 82 MMHG | HEIGHT: 65 IN

## 2024-07-15 DIAGNOSIS — I25.10 ARTERIOSCLEROSIS OF CORONARY ARTERY: ICD-10-CM

## 2024-07-15 PROCEDURE — 1123F ACP DISCUSS/DSCN MKR DOCD: CPT | Performed by: THORACIC SURGERY (CARDIOTHORACIC VASCULAR SURGERY)

## 2024-07-15 PROCEDURE — 99215 OFFICE O/P EST HI 40 MIN: CPT | Performed by: THORACIC SURGERY (CARDIOTHORACIC VASCULAR SURGERY)

## 2024-07-15 PROCEDURE — 1159F MED LIST DOCD IN RCRD: CPT | Performed by: THORACIC SURGERY (CARDIOTHORACIC VASCULAR SURGERY)

## 2024-07-15 PROCEDURE — 99205 OFFICE O/P NEW HI 60 MIN: CPT | Performed by: THORACIC SURGERY (CARDIOTHORACIC VASCULAR SURGERY)

## 2024-07-15 PROCEDURE — 3074F SYST BP LT 130 MM HG: CPT | Performed by: THORACIC SURGERY (CARDIOTHORACIC VASCULAR SURGERY)

## 2024-07-15 PROCEDURE — 3079F DIAST BP 80-89 MM HG: CPT | Performed by: THORACIC SURGERY (CARDIOTHORACIC VASCULAR SURGERY)

## 2024-07-15 PROCEDURE — 1126F AMNT PAIN NOTED NONE PRSNT: CPT | Performed by: THORACIC SURGERY (CARDIOTHORACIC VASCULAR SURGERY)

## 2024-07-15 ASSESSMENT — COLUMBIA-SUICIDE SEVERITY RATING SCALE - C-SSRS
2. HAVE YOU ACTUALLY HAD ANY THOUGHTS OF KILLING YOURSELF?: NO
1. IN THE PAST MONTH, HAVE YOU WISHED YOU WERE DEAD OR WISHED YOU COULD GO TO SLEEP AND NOT WAKE UP?: NO
6. HAVE YOU EVER DONE ANYTHING, STARTED TO DO ANYTHING, OR PREPARED TO DO ANYTHING TO END YOUR LIFE?: NO

## 2024-07-15 ASSESSMENT — PATIENT HEALTH QUESTIONNAIRE - PHQ9
SUM OF ALL RESPONSES TO PHQ9 QUESTIONS 1 AND 2: 0
2. FEELING DOWN, DEPRESSED OR HOPELESS: NOT AT ALL
1. LITTLE INTEREST OR PLEASURE IN DOING THINGS: NOT AT ALL

## 2024-07-15 ASSESSMENT — PAIN SCALES - GENERAL: PAINLEVEL: 0-NO PAIN

## 2024-07-17 DIAGNOSIS — I25.10 ACUTE DIASTOLIC HEART FAILURE SECONDARY TO CORONARY ARTERY DISEASE (MULTI): ICD-10-CM

## 2024-07-17 DIAGNOSIS — I50.9 HEART FAILURE, UNSPECIFIED HF CHRONICITY, UNSPECIFIED HEART FAILURE TYPE (MULTI): ICD-10-CM

## 2024-07-17 DIAGNOSIS — I25.110 CORONARY ARTERY DISEASE INVOLVING NATIVE CORONARY ARTERY OF NATIVE HEART WITH UNSTABLE ANGINA PECTORIS (MULTI): Primary | ICD-10-CM

## 2024-07-17 DIAGNOSIS — I25.10 ARTERIOSCLEROSIS OF CORONARY ARTERY: ICD-10-CM

## 2024-07-17 DIAGNOSIS — I50.31 ACUTE DIASTOLIC HEART FAILURE SECONDARY TO CORONARY ARTERY DISEASE (MULTI): ICD-10-CM

## 2024-07-17 NOTE — PROGRESS NOTES
Chief Complaint  Chest pain    HPI:   Ms. Nevaeh Fair is an 78 y.o. female, who is a patient of Dr.Charita AIMEE Montgomery, DO   I have been asked to see her by Dr. Trejo to evaluate options for her coronary artery disease.      Nevaeh Fair was seen in the clinic, accompanied by her daughter Cheryl.  She has developed significant angina with minimal to moderate activity placing her at CCS class II-III.  She has no chest pain at rest.  Despite her age she continues to work during the weekends.  She has no shortness of breath.  No PND orthopnea, no dizziness or syncope.    Investigations have revealed coronary artery disease for which she is being referred for possible coronary bypass surgery      Past Medical History:   Diagnosis Date    Coronary artery disease     Familial hypercholesterolemia 04/17/2014    Essential familial hypercholesterolemia    Hypertension     Presence of intraocular lens 07/10/2014    Pseudophakia, left eye       Past Surgical History:   Procedure Laterality Date    CARDIAC CATHETERIZATION N/A 7/10/2024    Procedure: Left Heart Cath with Coronary Angiography and LV;  Surgeon: Aditya Trejo MD;  Location: [facility] Cardiac Cath Lab;  Service: Cardiovascular;  Laterality: N/A;  7/10/24; 930 am for [facility] [address], CAD with angina I25.119 and hx stent Z98.61  [facility] medicare:  auth # [id number] 7/4/24-08/18/24    CATARACT EXTRACTION  07/10/2014    Cataract Surgery    OTHER SURGICAL HISTORY  11/13/2019    Colonoscopy       Family History   Problem Relation Name Age of Onset    Heart disease Mother      Coronary artery disease Mother      Heart disease Father      Heart attack Father      Hypertension Sister         Social History     Socioeconomic History    Marital status:      Spouse name: Not on file    Number of children: Not on file    Years of education: Not on file    Highest education level: Not on file   Occupational History    Not on file   Tobacco Use    Smoking status:  Former     Types: Cigarettes    Smokeless tobacco: Never   Vaping Use    Vaping status: Never Used   Substance and Sexual Activity    Alcohol use: Never    Drug use: Never    Sexual activity: Defer   Other Topics Concern    Not on file   Social History Narrative    Not on file     Social Determinants of Health     Financial Resource Strain: Not on file   Food Insecurity: Not on file   Transportation Needs: Not on file   Physical Activity: Not on file   Stress: Not on file   Social Connections: Not on file   Intimate Partner Violence: Not on file   Housing Stability: Not on file       No Known Allergies    Outpatient Encounter Medications as of 7/15/2024   Medication Sig Dispense Refill    aspirin 81 mg capsule Take by mouth once daily.      metoprolol succinate XL (Toprol-XL) 100 mg 24 hr tablet Take 1 tablet (100 mg) by mouth once daily.      nitroglycerin (Nitrostat) 0.4 mg SL tablet       omeprazole (PriLOSEC) 40 mg DR capsule TAKE 1 CAPSULE BY MOUTH TWICE A  capsule 1    QUEtiapine (SeroqueL) 50 mg tablet Take 1 tablet (50 mg) by mouth once daily at bedtime. 90 tablet 1    rosuvastatin (Crestor) 40 mg tablet TAKE 1 TABLET BY MOUTH EVERYDAY AT BEDTIME 90 tablet 0    [DISCONTINUED] dicyclomine (Bentyl) 10 mg capsule TAKE 1 CAPSULE (10 MG) BY MOUTH 4 TIMES A DAY AS NEEDED (ABDOMINAL PAIN OR CRAMPS). (Patient not taking: Reported on 7/10/2024) 30 capsule 0    [DISCONTINUED] semaglutide (Ozempic) 0.25 mg or 0.5 mg (2 mg/3 mL) pen injector INJECT 0.5 MG UNDER THE SKIN 1 (ONE) TIME PER WEEK. (Patient not taking: Reported on 6/10/2024) 9 mL 1    [DISCONTINUED] traMADol (Ultram) 50 mg tablet Take 1 tablet (50 mg) by mouth every 8 hours if needed.       No facility-administered encounter medications on file as of 7/15/2024.       Physical Exam  Constitutional:       Appearance: Normal appearance. She is normal weight.   Cardiovascular:      Rate and Rhythm: Normal rate and regular rhythm.      Pulses: Normal  pulses.      Heart sounds: No murmur heard.  Pulmonary:      Effort: Pulmonary effort is normal.      Breath sounds: Normal breath sounds.   Skin:     General: Skin is warm and dry.   Neurological:      General: No focal deficit present.      Mental Status: She is alert and oriented to person, place, and time.         No results found for this or any previous visit (from the past 4464 hour(s)).    Lab Results   Component Value Date    WBC 7.0 06/18/2024    HGB 13.3 06/18/2024    HCT 41.1 06/18/2024    MCV 97 06/18/2024     06/18/2024     Lab Results   Component Value Date    GLUCOSE 94 06/18/2024    CALCIUM 8.9 06/18/2024     06/18/2024    K 3.8 06/18/2024    CO2 26 06/18/2024     06/18/2024    BUN 22 06/18/2024    CREATININE 0.70 06/18/2024     No echocardiogram results found for the past 12 months     Assessment and Plan:   Ms. Nevaeh Fair is an 78 y.o. female who has been referred with two-vessel coronary artery disease.      I had a chance to review all available, pertinent investigations.  My interpretation of these studies is as follows:    Coronary angiography shows significant two-vessel disease, with in-stent restenosis of the LAD of about 80% and a proximal circumflex lesion of about 80%.  There is an ostial lesion in the diagonal but it is a relatively small vessel.  She has at least 2 good surgical targets to the LAD and the obtuse marginal branch.    Carotid duplex shows a lesion of 50 to 69% in the right internal carotid artery and no significant disease on the left.    Based on a review of her symptoms and investigations, I believe she would be best served with surgical revascularization.  Repeat stenting of the LAD is probably not the best option for her because of the presence of in-stent disease.  She would be a good candidate for on or off-pump surgery, the latter procedure being slightly preferable because of the presence of carotid disease.  The benefits would be to improve  her quality of life and decrease the risk of a myocardial infarction.  The risks would be in terms of stroke or mortality that would be 1 or 2%.  We also discussed other specific risks inherent to the procedure, including bleeding, infection, transfusion, myocardial infarction, renal insufficiency.  Based on the risks and benefits, I believe this patient would benefit from surgical intervention.  She understands the risks and benefits of this procedure, and has asked that we proceed.      The surgical strategy will be off-pump CABG x 2 with a LIMA to the LAD and a vein to the OM 2, alternatively we could do on pump and keep the perfusion pressure slightly higher.  The surgery will be performed at [facility] based on the complexity of the procedure and patient's preference.  I will refer the patient to Dr. Bigg Tellez, to perform the procedure, given his experience with off-pump surgery.  I have notified the patient of his involvement and she is very happy to proceed in this manner.    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    In preparation for surgery, she will require a PAT visit.  Further investigations will include a noncontrast CT scan of the chest to better assess the ascending aorta, and an echocardiogram to assess left ventricular function and valvular anatomy.  I am hopeful surgery can be scheduled in the near future.

## 2024-07-17 NOTE — H&P (VIEW-ONLY)
Chief Complaint  Chest pain    HPI:   Ms. Nevaeh Fair is an 78 y.o. female, who is a patient of Dr.Charita AIMEE Montgomery, DO   I have been asked to see her by Dr. Trejo to evaluate options for her coronary artery disease.      Nevaeh Fair was seen in the clinic, accompanied by her daughter Cheryl.  She has developed significant angina with minimal to moderate activity placing her at CCS class II-III.  She has no chest pain at rest.  Despite her age she continues to work during the weekends.  She has no shortness of breath.  No PND orthopnea, no dizziness or syncope.    Investigations have revealed coronary artery disease for which she is being referred for possible coronary bypass surgery      Past Medical History:   Diagnosis Date    Coronary artery disease     Familial hypercholesterolemia 04/17/2014    Essential familial hypercholesterolemia    Hypertension     Presence of intraocular lens 07/10/2014    Pseudophakia, left eye       Past Surgical History:   Procedure Laterality Date    CARDIAC CATHETERIZATION N/A 7/10/2024    Procedure: Left Heart Cath with Coronary Angiography and LV;  Surgeon: Aditya Trejo MD;  Location: [facility] Cardiac Cath Lab;  Service: Cardiovascular;  Laterality: N/A;  7/10/24; 930 am for [facility] [address], CAD with angina I25.119 and hx stent Z98.61  [facility] medicare:  auth # [id number] 7/4/24-08/18/24    CATARACT EXTRACTION  07/10/2014    Cataract Surgery    OTHER SURGICAL HISTORY  11/13/2019    Colonoscopy       Family History   Problem Relation Name Age of Onset    Heart disease Mother      Coronary artery disease Mother      Heart disease Father      Heart attack Father      Hypertension Sister         Social History     Socioeconomic History    Marital status:      Spouse name: Not on file    Number of children: Not on file    Years of education: Not on file    Highest education level: Not on file   Occupational History    Not on file   Tobacco Use    Smoking status:  Former     Types: Cigarettes    Smokeless tobacco: Never   Vaping Use    Vaping status: Never Used   Substance and Sexual Activity    Alcohol use: Never    Drug use: Never    Sexual activity: Defer   Other Topics Concern    Not on file   Social History Narrative    Not on file     Social Determinants of Health     Financial Resource Strain: Not on file   Food Insecurity: Not on file   Transportation Needs: Not on file   Physical Activity: Not on file   Stress: Not on file   Social Connections: Not on file   Intimate Partner Violence: Not on file   Housing Stability: Not on file       No Known Allergies    Outpatient Encounter Medications as of 7/15/2024   Medication Sig Dispense Refill    aspirin 81 mg capsule Take by mouth once daily.      metoprolol succinate XL (Toprol-XL) 100 mg 24 hr tablet Take 1 tablet (100 mg) by mouth once daily.      nitroglycerin (Nitrostat) 0.4 mg SL tablet       omeprazole (PriLOSEC) 40 mg DR capsule TAKE 1 CAPSULE BY MOUTH TWICE A  capsule 1    QUEtiapine (SeroqueL) 50 mg tablet Take 1 tablet (50 mg) by mouth once daily at bedtime. 90 tablet 1    rosuvastatin (Crestor) 40 mg tablet TAKE 1 TABLET BY MOUTH EVERYDAY AT BEDTIME 90 tablet 0    [DISCONTINUED] dicyclomine (Bentyl) 10 mg capsule TAKE 1 CAPSULE (10 MG) BY MOUTH 4 TIMES A DAY AS NEEDED (ABDOMINAL PAIN OR CRAMPS). (Patient not taking: Reported on 7/10/2024) 30 capsule 0    [DISCONTINUED] semaglutide (Ozempic) 0.25 mg or 0.5 mg (2 mg/3 mL) pen injector INJECT 0.5 MG UNDER THE SKIN 1 (ONE) TIME PER WEEK. (Patient not taking: Reported on 6/10/2024) 9 mL 1    [DISCONTINUED] traMADol (Ultram) 50 mg tablet Take 1 tablet (50 mg) by mouth every 8 hours if needed.       No facility-administered encounter medications on file as of 7/15/2024.       Physical Exam  Constitutional:       Appearance: Normal appearance. She is normal weight.   Cardiovascular:      Rate and Rhythm: Normal rate and regular rhythm.      Pulses: Normal  pulses.      Heart sounds: No murmur heard.  Pulmonary:      Effort: Pulmonary effort is normal.      Breath sounds: Normal breath sounds.   Skin:     General: Skin is warm and dry.   Neurological:      General: No focal deficit present.      Mental Status: She is alert and oriented to person, place, and time.         No results found for this or any previous visit (from the past 4464 hour(s)).    Lab Results   Component Value Date    WBC 7.0 06/18/2024    HGB 13.3 06/18/2024    HCT 41.1 06/18/2024    MCV 97 06/18/2024     06/18/2024     Lab Results   Component Value Date    GLUCOSE 94 06/18/2024    CALCIUM 8.9 06/18/2024     06/18/2024    K 3.8 06/18/2024    CO2 26 06/18/2024     06/18/2024    BUN 22 06/18/2024    CREATININE 0.70 06/18/2024     No echocardiogram results found for the past 12 months     Assessment and Plan:   Ms. Nevaeh Fair is an 78 y.o. female who has been referred with two-vessel coronary artery disease.      I had a chance to review all available, pertinent investigations.  My interpretation of these studies is as follows:    Coronary angiography shows significant two-vessel disease, with in-stent restenosis of the LAD of about 80% and a proximal circumflex lesion of about 80%.  There is an ostial lesion in the diagonal but it is a relatively small vessel.  She has at least 2 good surgical targets to the LAD and the obtuse marginal branch.    Carotid duplex shows a lesion of 50 to 69% in the right internal carotid artery and no significant disease on the left.    Based on a review of her symptoms and investigations, I believe she would be best served with surgical revascularization.  Repeat stenting of the LAD is probably not the best option for her because of the presence of in-stent disease.  She would be a good candidate for on or off-pump surgery, the latter procedure being slightly preferable because of the presence of carotid disease.  The benefits would be to improve  her quality of life and decrease the risk of a myocardial infarction.  The risks would be in terms of stroke or mortality that would be 1 or 2%.  We also discussed other specific risks inherent to the procedure, including bleeding, infection, transfusion, myocardial infarction, renal insufficiency.  Based on the risks and benefits, I believe this patient would benefit from surgical intervention.  She understands the risks and benefits of this procedure, and has asked that we proceed.      The surgical strategy will be off-pump CABG x 2 with a LIMA to the LAD and a vein to the OM 2, alternatively we could do on pump and keep the perfusion pressure slightly higher.  The surgery will be performed at [facility] based on the complexity of the procedure and patient's preference.  I will refer the patient to Dr. Bigg Tellez, to perform the procedure, given his experience with off-pump surgery.  I have notified the patient of his involvement and she is very happy to proceed in this manner.    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    In preparation for surgery, she will require a PAT visit.  Further investigations will include a noncontrast CT scan of the chest to better assess the ascending aorta, and an echocardiogram to assess left ventricular function and valvular anatomy.  I am hopeful surgery can be scheduled in the near future.

## 2024-07-18 PROBLEM — I25.110 CORONARY ARTERY DISEASE INVOLVING NATIVE CORONARY ARTERY OF NATIVE HEART WITH UNSTABLE ANGINA PECTORIS (MULTI): Status: ACTIVE | Noted: 2024-07-17

## 2024-07-18 RX ORDER — SODIUM CHLORIDE, SODIUM LACTATE, POTASSIUM CHLORIDE, CALCIUM CHLORIDE 600; 310; 30; 20 MG/100ML; MG/100ML; MG/100ML; MG/100ML
20 INJECTION, SOLUTION INTRAVENOUS CONTINUOUS
OUTPATIENT
Start: 2024-07-18

## 2024-07-22 ENCOUNTER — TELEPHONE (OUTPATIENT)
Dept: PRIMARY CARE | Facility: CLINIC | Age: 79
End: 2024-07-22
Payer: MEDICARE

## 2024-07-23 ENCOUNTER — OFFICE VISIT (OUTPATIENT)
Dept: PRIMARY CARE | Facility: CLINIC | Age: 79
End: 2024-07-23
Payer: MEDICARE

## 2024-07-23 VITALS
DIASTOLIC BLOOD PRESSURE: 82 MMHG | WEIGHT: 149 LBS | OXYGEN SATURATION: 98 % | BODY MASS INDEX: 24.83 KG/M2 | HEIGHT: 65 IN | HEART RATE: 78 BPM | SYSTOLIC BLOOD PRESSURE: 120 MMHG

## 2024-07-23 DIAGNOSIS — R10.9 ABDOMINAL CRAMPING: Primary | ICD-10-CM

## 2024-07-23 DIAGNOSIS — F41.9 ANXIETY: ICD-10-CM

## 2024-07-23 PROCEDURE — 3074F SYST BP LT 130 MM HG: CPT | Performed by: STUDENT IN AN ORGANIZED HEALTH CARE EDUCATION/TRAINING PROGRAM

## 2024-07-23 PROCEDURE — 1160F RVW MEDS BY RX/DR IN RCRD: CPT | Performed by: STUDENT IN AN ORGANIZED HEALTH CARE EDUCATION/TRAINING PROGRAM

## 2024-07-23 PROCEDURE — 99214 OFFICE O/P EST MOD 30 MIN: CPT | Performed by: STUDENT IN AN ORGANIZED HEALTH CARE EDUCATION/TRAINING PROGRAM

## 2024-07-23 PROCEDURE — 1159F MED LIST DOCD IN RCRD: CPT | Performed by: STUDENT IN AN ORGANIZED HEALTH CARE EDUCATION/TRAINING PROGRAM

## 2024-07-23 PROCEDURE — 1123F ACP DISCUSS/DSCN MKR DOCD: CPT | Performed by: STUDENT IN AN ORGANIZED HEALTH CARE EDUCATION/TRAINING PROGRAM

## 2024-07-23 PROCEDURE — 3079F DIAST BP 80-89 MM HG: CPT | Performed by: STUDENT IN AN ORGANIZED HEALTH CARE EDUCATION/TRAINING PROGRAM

## 2024-07-23 RX ORDER — ALPRAZOLAM 0.5 MG/1
0.5 TABLET ORAL 3 TIMES DAILY PRN
Qty: 21 TABLET | Refills: 0 | Status: SHIPPED | OUTPATIENT
Start: 2024-07-23 | End: 2024-07-30

## 2024-07-23 RX ORDER — DICYCLOMINE HYDROCHLORIDE 10 MG/1
1 CAPSULE ORAL 4 TIMES DAILY PRN
COMMUNITY
End: 2024-07-23 | Stop reason: SDUPTHER

## 2024-07-23 RX ORDER — DICYCLOMINE HYDROCHLORIDE 10 MG/1
10 CAPSULE ORAL 4 TIMES DAILY
Qty: 120 CAPSULE | Refills: 2 | Status: SHIPPED | OUTPATIENT
Start: 2024-07-23 | End: 2024-10-21

## 2024-07-23 NOTE — PROGRESS NOTES
Subjective   Patient ID: Nevaeh Fair is a 78 y.o. female who presents for Anxiety (Pt is here to discuss getting something for her anxiety due to need a  triple bypass surgery.).    HPI  Pt is scheduled to have a CABG x 2 on 8/5/24 with Dr. Tellez.   Pt is very tearful in our visit today.   Pt has been having anginal pain for the past couple of weeks and has had to use her nitroglycerin more frequently.     Cardiac Cath-  LAD- 80% stenosis   Circumflex Coronary Artery- 80% stenosis     Pt has been taking her dogs xanax 1mg  dosing    Abdominal Pain   Abdominal cramping has been greatly reduced with the dicyclomine       Objective   Physical Exam  Vitals reviewed.   Constitutional:       Appearance: Normal appearance.   Cardiovascular:      Rate and Rhythm: Normal rate and regular rhythm.      Heart sounds: No murmur heard.  Pulmonary:      Effort: Pulmonary effort is normal. No respiratory distress.      Breath sounds: Normal breath sounds. No wheezing.   Musculoskeletal:      Cervical back: Neck supple.      Left lower leg: No edema.   Neurological:      Mental Status: She is alert.       Assessment/Plan   Diagnoses and all orders for this visit:  Abdominal cramping  -     dicyclomine (Bentyl) 10 mg capsule; Take 1 capsule (10 mg) by mouth 4 times a day.  Anxiety  -     ALPRAZolam (Xanax) 0.5 mg tablet; Take 1 tablet (0.5 mg) by mouth 3 times a day as needed for anxiety for up to 7 days.  Anxiety  Increasing with current need for cardiovascular surgery  Patient to continue her quetiapine 50 mg at nighttime  Patient will be given 0.5 mg of Xanax to get her through to her surgery  [facility] was reviewed    Abdominal cramping  Great improvement  Bentyl as needed as needed  Return to care postop         Beba Montgomery DO 07/23/24 10:52 AM

## 2024-07-31 ENCOUNTER — APPOINTMENT (OUTPATIENT)
Dept: RADIOLOGY | Facility: HOSPITAL | Age: 79
End: 2024-07-31
Payer: MEDICARE

## 2024-07-31 ENCOUNTER — APPOINTMENT (OUTPATIENT)
Dept: CARDIOLOGY | Facility: HOSPITAL | Age: 79
End: 2024-07-31
Payer: MEDICARE

## 2024-08-01 DIAGNOSIS — I25.10 ARTERIOSCLEROSIS OF CORONARY ARTERY: ICD-10-CM

## 2024-08-02 DIAGNOSIS — K21.9 GASTRO-ESOPHAGEAL REFLUX DISEASE WITHOUT ESOPHAGITIS: ICD-10-CM

## 2024-08-05 ENCOUNTER — CLINICAL SUPPORT (OUTPATIENT)
Dept: PREADMISSION TESTING | Facility: HOSPITAL | Age: 79
End: 2024-08-05
Payer: MEDICARE

## 2024-08-05 NOTE — CPM/PAT NURSE NOTE
CPM/PAT Nurse Note      Name: Nevaeh Fair (Nevaeh Fair)  /Age: 1945/78 y.o.       Past Medical History:   Diagnosis Date    Abdominal cramping     Anxiety     Coronary artery disease     Familial hypercholesterolemia 2014    Essential familial hypercholesterolemia    Hypertension     Presence of intraocular lens 07/10/2014    Pseudophakia, left eye       Past Surgical History:   Procedure Laterality Date    CARDIAC CATHETERIZATION N/A 07/10/2024    Procedure: Left Heart Cath with Coronary Angiography and LV;  Surgeon: Aditya Trejo MD;  Location: [facility] Cardiac Cath Lab;  Service: Cardiovascular;  Laterality: N/A;  7/10/24; 930 am for [facility] [address], CAD with angina I25.119 and hx stent Z98.61  [facility] medicare:  auth # [id number]    CATARACT EXTRACTION      COLONOSCOPY      ESOPHAGOGASTRODUODENOSCOPY         Patient Sexual activity questions deferred to the physician.    Family History   Problem Relation Name Age of Onset    Heart disease Mother      Coronary artery disease Mother      Heart disease Father      Heart attack Father      Hypertension Sister         No Known Allergies    Prior to Admission medications    Medication Sig Start Date End Date Taking? Authorizing Provider   ALPRAZolam (Xanax) 0.5 mg tablet Take 1 tablet (0.5 mg) by mouth 3 times a day as needed for anxiety for up to 7 days. 24  Beba Montgomery, DO   aspirin 81 mg capsule Take by mouth once daily.    Historical Provider, MD   dicyclomine (Bentyl) 10 mg capsule Take 1 capsule (10 mg) by mouth 4 times a day. 7/23/24 10/21/24  Beba Montgomery, DO   metoprolol succinate XL (Toprol-XL) 100 mg 24 hr tablet Take 1 tablet (100 mg) by mouth once daily. 22   Historical Provider, MD   nitroglycerin (Nitrostat) 0.4 mg SL tablet     Historical Provider, MD   omeprazole (PriLOSEC) 40 mg DR capsule TAKE 1 CAPSULE BY MOUTH TWICE A DAY  Patient not taking: Reported on 2024   Beba Montgomery,  DO   QUEtiapine (SeroqueL) 50 mg tablet Take 1 tablet (50 mg) by mouth once daily at bedtime. 6/10/24 12/7/24  Beba Montgomery,    rosuvastatin (Crestor) 40 mg tablet TAKE 1 TABLET BY MOUTH EVERYDAY AT BEDTIME 4/15/24   Beba Montgomery, DO        PAT ROS     DASI Risk Score    No data to display       Caprini DVT Assessment    No data to display       Modified Frailty Index    No data to display       CHADS2 Stroke Risk  Current as of a minute ago        N/A 3 to 100%: High Risk   2 to < 3%: Medium Risk   0 to < 2%: Low Risk     Last Change: N/A          This score determines the patient's risk of having a stroke if the patient has atrial fibrillation.        This score is not applicable to this patient. Components are not calculated.          Revised Cardiac Risk Index    No data to display       Apfel Simplified Score    No data to display       Risk Analysis Index Results This Encounter    No data found in the last 1 encounters.       Scheduled for Off Pump Coronary Artery Bypass Graft x 2; LIMA-LAD; Vein-OM2 with Dr. Bigg Tellez on 8/9/24.  PCP: Beba Montgomery DO LOV 7/23/24  Cardiology: Kyle Paige MD [facility] 7/15/24 referred by Dr. Trejo to evaluate CAD.  Cardiology: Aditya Trejo MD P: [phone], F: [phone]  -Cardiac Cath: 7/10/24  CONCLUSIONS:   1. Double vessel coronary artery disease with proximal left anterior descending involvement.   2. The 1st diagonal branch showed severe ostial atherosclerotic disease.   3. The 1st obtuse marginal branch showed severe proximal atherosclerotic disease.   4. Culprit vessel(s): left anterior descending, 1st diagonal, circumflex and 1st obtuse marginal.   5. Large left subclavian artery gives origin to large LIMA, a suitable graft conduit.    -Nuclear Stress Test: 6/14/24  Summary:  No chest discomfort or ischemic EKG changes with exercise.  Normal left ventricular systolic function.  No perfusion evidence of scar, or focal ischemia.    -ECG:  5/21/24  Normal Sinus Rhythm  Nonspecific T wave abnormality  Abnormal ECG    -Carotid Artery Ultrasound: 5/14/24  Conclusions:  Right internal Carotid Stenosis: 50-69%  Right Vertebral Flow: Antegrade  Left Internal Carotid Stenosis: 20-49%  Left Vertebral Flow: Antegrde  Bilateral Upper Extremity: Findings consistent with a normal interarm systolic pressure gradient.    -ECHO: 7/27/21  Conclusions: See above for details. Technically good study. Normal left ventricular size and function. Estimated LVEF 65% Mild aortic insufficiency. Mild mitral regurgitation. Mild tricuspid regurgitation. Abnormal left ventricular relaxation. Normal PASP, unable to estimate CVP.    Nurse Plan of Action:   Unable to reach the patient, LVM.  ONLY  Attempted to reach the patient again at 1610 and was unsuccessful.    Candy Rosario RN  Pre-Admission Testing

## 2024-08-06 ENCOUNTER — PRE-ADMISSION TESTING (OUTPATIENT)
Dept: PREADMISSION TESTING | Facility: HOSPITAL | Age: 79
DRG: 236 | End: 2024-08-06
Payer: MEDICARE

## 2024-08-06 ENCOUNTER — HOSPITAL ENCOUNTER (OUTPATIENT)
Dept: CARDIOLOGY | Facility: HOSPITAL | Age: 79
Discharge: HOME | End: 2024-08-06
Payer: MEDICARE

## 2024-08-06 ENCOUNTER — ANESTHESIA EVENT (OUTPATIENT)
Dept: OPERATING ROOM | Facility: HOSPITAL | Age: 79
End: 2024-08-06
Payer: MEDICARE

## 2024-08-06 ENCOUNTER — HOSPITAL ENCOUNTER (OUTPATIENT)
Dept: RADIOLOGY | Facility: HOSPITAL | Age: 79
Discharge: HOME | End: 2024-08-06
Payer: MEDICARE

## 2024-08-06 VITALS
OXYGEN SATURATION: 98 % | DIASTOLIC BLOOD PRESSURE: 89 MMHG | TEMPERATURE: 96.1 F | SYSTOLIC BLOOD PRESSURE: 152 MMHG | WEIGHT: 150.57 LBS | HEIGHT: 65 IN | HEART RATE: 68 BPM | BODY MASS INDEX: 25.09 KG/M2

## 2024-08-06 DIAGNOSIS — I25.118 CORONARY ARTERY DISEASE OF NATIVE ARTERY OF NATIVE HEART WITH STABLE ANGINA PECTORIS (CMS-HCC): ICD-10-CM

## 2024-08-06 DIAGNOSIS — I50.9 HEART FAILURE, UNSPECIFIED HF CHRONICITY, UNSPECIFIED HEART FAILURE TYPE (MULTI): ICD-10-CM

## 2024-08-06 DIAGNOSIS — Z41.9 ELECTIVE SURGERY: ICD-10-CM

## 2024-08-06 DIAGNOSIS — I25.110 CORONARY ARTERY DISEASE INVOLVING NATIVE CORONARY ARTERY OF NATIVE HEART WITH UNSTABLE ANGINA PECTORIS (MULTI): ICD-10-CM

## 2024-08-06 DIAGNOSIS — Z41.9 ELECTIVE SURGERY: Primary | ICD-10-CM

## 2024-08-06 DIAGNOSIS — E11.8 CONTROLLED TYPE 2 DIABETES MELLITUS WITH COMPLICATION, WITHOUT LONG-TERM CURRENT USE OF INSULIN (MULTI): ICD-10-CM

## 2024-08-06 DIAGNOSIS — I25.10 ARTERIOSCLEROSIS OF CORONARY ARTERY: ICD-10-CM

## 2024-08-06 DIAGNOSIS — D68.9 COAGULATION DEFECT (MULTI): ICD-10-CM

## 2024-08-06 DIAGNOSIS — F41.9 ANXIETY: ICD-10-CM

## 2024-08-06 DIAGNOSIS — Z01.810 ENCOUNTER FOR PREPROCEDURAL CARDIOVASCULAR EXAMINATION: ICD-10-CM

## 2024-08-06 LAB
ABO GROUP (TYPE) IN BLOOD: NORMAL
ANION GAP SERPL CALC-SCNC: 15 MMOL/L (ref 10–20)
ANTIBODY SCREEN: NORMAL
APPEARANCE UR: CLEAR
APTT PPP: 26 SECONDS (ref 27–38)
BASOPHILS # BLD AUTO: 0.02 X10*3/UL (ref 0–0.1)
BASOPHILS NFR BLD AUTO: 0.3 %
BILIRUB UR STRIP.AUTO-MCNC: NEGATIVE MG/DL
BUN SERPL-MCNC: 29 MG/DL (ref 6–23)
CALCIUM SERPL-MCNC: 9.9 MG/DL (ref 8.6–10.6)
CHLORIDE SERPL-SCNC: 102 MMOL/L (ref 98–107)
CO2 SERPL-SCNC: 28 MMOL/L (ref 21–32)
COLOR UR: NORMAL
CREAT SERPL-MCNC: 1.01 MG/DL (ref 0.5–1.05)
CRP SERPL-MCNC: <0.1 MG/DL
EGFRCR SERPLBLD CKD-EPI 2021: 57 ML/MIN/1.73M*2
EOSINOPHIL # BLD AUTO: 0.01 X10*3/UL (ref 0–0.4)
EOSINOPHIL NFR BLD AUTO: 0.1 %
ERYTHROCYTE [DISTWIDTH] IN BLOOD BY AUTOMATED COUNT: 14 % (ref 11.5–14.5)
GLUCOSE SERPL-MCNC: 110 MG/DL (ref 74–99)
GLUCOSE UR STRIP.AUTO-MCNC: NORMAL MG/DL
HCT VFR BLD AUTO: 42.4 % (ref 36–46)
HGB BLD-MCNC: 14.1 G/DL (ref 12–16)
IMM GRANULOCYTES # BLD AUTO: 0.07 X10*3/UL (ref 0–0.5)
IMM GRANULOCYTES NFR BLD AUTO: 0.9 % (ref 0–0.9)
INR PPP: 1 (ref 0.9–1.1)
KETONES UR STRIP.AUTO-MCNC: NEGATIVE MG/DL
LEUKOCYTE ESTERASE UR QL STRIP.AUTO: NEGATIVE
LYMPHOCYTES # BLD AUTO: 2.26 X10*3/UL (ref 0.8–3)
LYMPHOCYTES NFR BLD AUTO: 28.8 %
MCH RBC QN AUTO: 32.4 PG (ref 26–34)
MCHC RBC AUTO-ENTMCNC: 33.3 G/DL (ref 32–36)
MCV RBC AUTO: 98 FL (ref 80–100)
MONOCYTES # BLD AUTO: 0.66 X10*3/UL (ref 0.05–0.8)
MONOCYTES NFR BLD AUTO: 8.4 %
NEUTROPHILS # BLD AUTO: 4.82 X10*3/UL (ref 1.6–5.5)
NEUTROPHILS NFR BLD AUTO: 61.5 %
NITRITE UR QL STRIP.AUTO: NEGATIVE
NRBC BLD-RTO: 0 /100 WBCS (ref 0–0)
PH UR STRIP.AUTO: 6 [PH]
PLATELET # BLD AUTO: 210 X10*3/UL (ref 150–450)
POTASSIUM SERPL-SCNC: 4.4 MMOL/L (ref 3.5–5.3)
PROT UR STRIP.AUTO-MCNC: NORMAL MG/DL
PROTHROMBIN TIME: 10.8 SECONDS (ref 9.8–12.8)
RBC # BLD AUTO: 4.35 X10*6/UL (ref 4–5.2)
RBC # UR STRIP.AUTO: NEGATIVE /UL
RBC #/AREA URNS AUTO: NORMAL /HPF
RH FACTOR (ANTIGEN D): NORMAL
SODIUM SERPL-SCNC: 141 MMOL/L (ref 136–145)
SP GR UR STRIP.AUTO: 1.02
UROBILINOGEN UR STRIP.AUTO-MCNC: NORMAL MG/DL
WBC # BLD AUTO: 7.8 X10*3/UL (ref 4.4–11.3)
WBC #/AREA URNS AUTO: NORMAL /HPF

## 2024-08-06 PROCEDURE — 86900 BLOOD TYPING SEROLOGIC ABO: CPT

## 2024-08-06 PROCEDURE — 93306 TTE W/DOPPLER COMPLETE: CPT | Performed by: INTERNAL MEDICINE

## 2024-08-06 PROCEDURE — 71046 X-RAY EXAM CHEST 2 VIEWS: CPT

## 2024-08-06 PROCEDURE — 93005 ELECTROCARDIOGRAM TRACING: CPT

## 2024-08-06 PROCEDURE — 36415 COLL VENOUS BLD VENIPUNCTURE: CPT

## 2024-08-06 PROCEDURE — 86140 C-REACTIVE PROTEIN: CPT

## 2024-08-06 PROCEDURE — 99204 OFFICE O/P NEW MOD 45 MIN: CPT | Performed by: STUDENT IN AN ORGANIZED HEALTH CARE EDUCATION/TRAINING PROGRAM

## 2024-08-06 PROCEDURE — 81001 URINALYSIS AUTO W/SCOPE: CPT

## 2024-08-06 PROCEDURE — 93306 TTE W/DOPPLER COMPLETE: CPT

## 2024-08-06 PROCEDURE — 87081 CULTURE SCREEN ONLY: CPT

## 2024-08-06 PROCEDURE — 93010 ELECTROCARDIOGRAM REPORT: CPT | Performed by: INTERNAL MEDICINE

## 2024-08-06 PROCEDURE — 86901 BLOOD TYPING SEROLOGIC RH(D): CPT

## 2024-08-06 PROCEDURE — 85610 PROTHROMBIN TIME: CPT

## 2024-08-06 PROCEDURE — 80048 BASIC METABOLIC PNL TOTAL CA: CPT

## 2024-08-06 PROCEDURE — 85025 COMPLETE CBC W/AUTO DIFF WBC: CPT

## 2024-08-06 RX ORDER — OMEPRAZOLE 40 MG/1
40 CAPSULE, DELAYED RELEASE ORAL 2 TIMES DAILY
Qty: 180 CAPSULE | Refills: 1 | Status: ON HOLD | OUTPATIENT
Start: 2024-08-06

## 2024-08-06 RX ORDER — CHLORHEXIDINE GLUCONATE ORAL RINSE 1.2 MG/ML
15 SOLUTION DENTAL DAILY
Qty: 473 ML | Refills: 0 | Status: ON HOLD | OUTPATIENT
Start: 2024-08-06 | End: 2024-08-09 | Stop reason: ALTCHOICE

## 2024-08-06 RX ORDER — CHLORHEXIDINE GLUCONATE 40 MG/ML
1 SOLUTION TOPICAL DAILY
Qty: 473 ML | Refills: 0 | Status: ON HOLD | OUTPATIENT
Start: 2024-08-06 | End: 2024-08-09 | Stop reason: ALTCHOICE

## 2024-08-06 ASSESSMENT — DUKE ACTIVITY SCORE INDEX (DASI)
CAN YOU WALK A BLOCK OR TWO ON LEVEL GROUND: YES
CAN YOU WALK INDOORS, SUCH AS AROUND YOUR HOUSE: YES
CAN YOU DO YARD WORK LIKE RAKING LEAVES, WEEDING OR PUSHING A MOWER: YES
CAN YOU DO MODERATE WORK AROUND THE HOUSE LIKE VACUUMING, SWEEPING FLOORS OR CARRYING GROCERIES: YES
CAN YOU HAVE SEXUAL RELATIONS: YES
TOTAL_SCORE: 58.2
CAN YOU PARTICIPATE IN STRENOUS SPORTS LIKE SWIMMING, SINGLES TENNIS, FOOTBALL, BASKETBALL, OR SKIING: YES
DASI METS SCORE: 9.9
CAN YOU RUN A SHORT DISTANCE: YES
CAN YOU TAKE CARE OF YOURSELF (EAT, DRESS, BATHE, OR USE TOILET): YES
CAN YOU DO LIGHT WORK AROUND THE HOUSE LIKE DUSTING OR WASHING DISHES: YES
CAN YOU DO HEAVY WORK AROUND THE HOUSE LIKE SCRUBBING FLOORS OR LIFTING AND MOVING HEAVY FURNITURE: YES
CAN YOU CLIMB A FLIGHT OF STAIRS OR WALK UP A HILL: YES
CAN YOU PARTICIPATE IN MODERATE RECREATIONAL ACTIVITIES LIKE GOLF, BOWLING, DANCING, DOUBLES TENNIS OR THROWING A BASEBALL OR FOOTBALL: YES

## 2024-08-06 ASSESSMENT — ENCOUNTER SYMPTOMS
CONSTITUTIONAL NEGATIVE: 1
ENDOCRINE NEGATIVE: 1
MUSCULOSKELETAL NEGATIVE: 1
EYES NEGATIVE: 1
RESPIRATORY NEGATIVE: 1
NEUROLOGICAL NEGATIVE: 1
GASTROINTESTINAL NEGATIVE: 1
NECK NEGATIVE: 1

## 2024-08-06 NOTE — PREPROCEDURE INSTRUCTIONS
Thank you for visiting [facility] today for your pre-procedure evaluation, you were seen by Dr. Constantin Norwood [phone]    This summary includes instructions and information to aid you during your perioperative period.  Please read carefully. If you have any questions about your visit today, please call the number listed above.  If you become ill or have any changes to your health before your surgery, please contact your primary care provider and alert your surgeon.    Preparing for your Surgery       Exercises  Preoperative Deep Breathing Exercises  Why it is important to do deep breathing exercises before my surgery?  Deep breathing exercises strengthen your breathing muscles.  This helps you to recover after your surgery and decreases the chance of breathing complications.  How are the deep breathing exercises done?  Sit straight with your back supported.  Breathe in deeply and slowly through your nose. Your lower rib cage should expand and your abdomen may move forward.  Hold that breath for 3 to 5 seconds.  Breathe out through pursed lips, slowly and completely.  Rest and repeat 10 times every hour while awake.  Rest longer if you become dizzy or lightheaded.      Preoperative Brain Exercises    What are brain exercises?  A brain exercise is any activity that engages your thinking (cognitive) skills.    What types of activities are considered brain exercises?  Jigsaw puzzles, crossword puzzles, word jumble, memory games, word search, and many more.  Many can be found free online or on your phone via a mobile fox.    Why should I do brain exercises before my surgery?  More recent research has shown brain exercise before surgery can lower the risk of postoperative delirium (confusion) which can be especially important for older adults.  Patients who did brain exercises for 5 to 10 hours the days before surgery, cut their risk of postoperative delirium in half up to 1 week  after surgery.    Sit-to-Stand Exercise    What is the sit-to-stand exercise?  The sit-to-stand exercise strengthens the muscles of your lower body and muscles in the center of your body (core muscles for stability) helping to maintain and improve your strength and mobility.  How do I do the sit-to-stand exercise?  The goal is to do this exercise without using your arms or hands.  If this is too difficult, use your arms and hands or a chair with armrests to help slowly push yourself to the standing position and lower yourself back to the sitting position. As the movement becomes easier use your arms and hands less.    Steps to the sit-to-stand exercise  Sit up tall in a sturdy chair, knees bent, feet flat on the floor shoulder-width apart.  Shift your hips/pelvis forward in the chair to correctly position yourself for the next movement.  Lean forward at your hips.  Stand up straight putting equal weight on both feet.  Check to be sure you are properly aligned with the chair, in a slow controlled movement sit back down.  Repeat this exercise 10-15 times.  If needed you can do it fewer times until your strength improves.  Rest for 1 minute.  Do another 10-15 sit-to-stand exercises.  Try to do this in the morning and evening.        Instructions    Preoperative Fasting Guidelines    Why must I stop eating and drinking near surgery time?  With sedation, food or liquid in your stomach can enter your lungs causing serious complications  Food can increase nausea and vomiting  When do I need to stop eating and drinking before my surgery?      Do not eat any food or drink any liquids after midnight the night before your surgery/procedure.  You may have sips of water to take medications.            Simple things you can do to help prevent blood clots     Blood clots are blockages that can form in the body's veins. When a blood clot forms in your deep veins, it may be called a deep vein thrombosis, or DVT for short. Blood  clots can happen in any part of the body where blood flows, but they are most common in the arms and legs. If a piece of a blood clot breaks free and travels to the lungs, it is called a pulmonary embolus (PE). A PE can be a very serious problem.         Being in the hospital or having surgery can raise your chances of getting a blood clot because you may not be well enough to move around as much as you normally do.         Ways you can help prevent blood clots in the hospital       Wearing SCDs  SCDs stands for Sequential Compression Devices.   SCDs are special sleeves that wrap around your legs. They attach to a pump that fills them with air to gently squeeze your legs every few minutes.  This helps return the blood in your legs to your heart.   SCDs should only be taken off when walking or bathing. SCDs may not be comfortable, but they can help save your life.              Pump SCD leg sleeves  Wearing compression stockings - if your doctor orders them. These special snug-fitting stockings gently squeeze your legs to help blood flow.       Walking. Walking helps move the blood in your legs.   If your doctor says it is ok, try walking the halls at least   5 times a day. Ask us to help you get up, so you don't fall.      Taking any blood-thinning medicines your doctor orders.              Ways you can help prevent blood clots at home         Wearing compression stockings - if your doctor orders them.   Walking - to help move the blood in your legs.    Taking any blood-thinning medicines your doctor orders.      Signs of a blood clot or PE    Tell your doctor or nurse right away if you have any of the problems listed below.         If you are at home, seek medical care right away. Call 911 for chest pain or problems breathing.            Signs of a blood clot (DVT) - such as pain, swelling, redness, or warmth in your arm or legs.  Signs of a pulmonary embolism (PE) - such as chest pain or feeling short of breath     "  Tobacco and Alcohol;  Do not drink alcohol or smoke within 24 hours of surgery.  It is best to quit smoking for as long as possible before any surgery or procedure.     Other Instructions  Why did I have my nose, under my arms, and groin swabbed? The purpose of the swab is to identify Staphylococcus aureus inside your nose or on your skin.  The swab was sent to the laboratory for culture.  A positive swab/culture for Staphylococcus aureus is called colonization or carriage.   What is Staphylococcus aureus? Staphylococcus aureus, also known as \"staph\", is a germ found on the skin or in the nose of healthy people.  Sometimes Staphylococcus aureus can get into the body and cause an infection.  This can be minor (such as pimples, boils, or other skin problems).  It might also be serious (such as a blood infection, pneumonia, or a surgical site infection). What is Staphylococcus aureus colonization or carriage? Colonization or carriage means that a person has the germ but is not sick from it.  These bacteria can be spread on the hands or when breathing or sneezing. How is Staphylococcus aureus spread? It is most often spread by close contact with a person or item that carries it. What happens if my culture is positive for Staphylococcus aureus? Your doctor/medical team will use this information to guide any antibiotic treatment which may be necessary.  Regardless of the culture results, we will clean the inside of your nose with a betadine swab just before you have your surgery. Will I get an infection if I have Staphylococcus aureus in my nose or on my skin? Anyone can get an infection with Staphylococcus aureus.  However, the best way to reduce your risk of infection is to follow the instructions provided to you for the use of your CHG soap and dental rinse.  , Body Wash; What is a home preoperative antibacterial shower? This shower is a way of cleaning the skin with a germ-killing solution before surgery.  The " solution contains chlorhexidine, commonly known as CHG.  CHG is a skin cleanser with germ-killing ability.  Let your doctor know if you are allergic to chlorhexidine. Why do I need to take a preoperative antibacterial shower? Skin is not sterile.  It is best to try to make your skin as free of germs as possible before surgery.  Proper cleansing with a germ-killing soap before surgery can lower the number of germs on your skin.  This helps to reduce the risk of infection at the surgical site.  Following the instructions listed below will help you prepare your skin for surgery.   How do I use the solution? Steps:  Begin using your CHG soap 5 days before your scheduled surgery on ___________.   First, wash and rinse your hair using the CHG soap. Keep CHG soap away from ear canals and eyes.  Rinse completely, do not condition.  Hair extensions should be removed. , Oral/Dental Rinse: What is oral/dental rinse?  It is a mouthwash. It is a way of cleaning the mouth with a germ-killing solution before your surgery.  The solution contains chlorhexidine, commonly known as CHG. It is used inside the mouth to kill a bacteria known as Staphylococcus aureus.  Let your doctor know if you are allergic to Chlorhexidine. Why do I need to use CHG oral/dental rinse? The CHG oral/dental rinse helps to kill a bacteria in your mouth known as Staphylococcus aureus.  This reduces the risk of infection at the surgical site.  Using your CHG oral/dental rinse STEPS: Use your CHG oral/dental rinse after you brush your teeth the night before (at bedtime) and the morning of your surgery.  Follow all directions on your prescription label.  Use the cap on the container to measure 15 ml.  Swish (gargle if you can) the mouthwash in your mouth for at least 30 seconds, (do not swallow) and spit out.  After you use your CHG rinse, do not rinse your mouth with water, drink or eat.  Please refer to the prescription label for the appropriate time to resume  oral intake What side effects might I have using the CHG oral/dental rinse? CHG rinse will stick to plaque on the teeth.  Brush and floss just before use.  Teeth brushing will help avoid staining of plaque during use.     The Week before Surgery        Seven days before Surgery  Check your [facility] medication instructions  Do the exercises provided to you by [facility]   Arrange for a responsible, adult licensed  to take you home after surgery and stay with you for 24 hours.  You will not be permitted to drive yourself home if you have received any anesthetic/sedation  Six days before surgery  Check your [facility] medication instructions  Do the exercises provided to you by [facility]   Start using Chlorhexidene (CHG) body wash if prescribed  Five days before surgery  Check your [facility] medication instructions  Do the exercises provided to you by [facility]   Continue to use CHG body wash if prescribed  Three days before surgery  Check your [facility] medication instructions  Do the exercises provided to you by [facility]   Continue to use CHG body wash if prescribed  Two days before surgery  Check your [facility] medication instructions  Do the exercises provided to you by [facility]   Continue to use CHG body wash if prescribed    The Day before Surgery       Check your [facility] medication and all other [facility] instructions including when to stop eating and drinking  You will be called with your arrival time for surgery in the late afternoon.  If you do not receive a call please reach out to your surgeon's office.  Do not smoke or drink 24 hours before surgery  Prepare items to bring with you to the hospital  Shower with your chlorhexidine wash if prescribed  Brush your teeth and use your chlorhexidine dental rinse if prescribed    The Day of Surgery       Check your [facility] medication instructions  Ensure you follow the instructions for when to stop eating and drinking  Shower, if prescribed use CHG.  Do not apply any lotions, creams, moisturizers, perfume or deodorant  Brush your  teeth and use your CHG dental rinse if prescribed  Wear loose comfortable clothing  Avoid make-up  Remove  jewelry and piercings, consider professional piercing removal with a plastic spacer if needed  Bring photo ID and Insurance card  Bring an accurate medication list that includes medication dose, frequency and allergies  Bring a copy of your advanced directives (will, health care power of )  Bring any devices and controllers as well as medical devices you have been provided with for surgery (CPAP, slings, braces, etc.)  Dentures, eyeglasses, and contacts will be removed before surgery, please bring cases for contacts or glasses

## 2024-08-06 NOTE — CPM/PAT H&P
[facility]/PAT Evaluation       Name: Nevaeh Fair (Nevaeh Fair)  /Age: 1945/78 y.o.     In-Person       78 y.o.  female  scheduled for off pump CABG x2 w/ LIMA-LAD and vein graft-OM2 on  with Dr. Bigg Tellez.  PMHX includes CAD, anxiety.  Presents to [facility] today for perioperative risk stratification and optimization    Past Medical History:   Diagnosis Date    Abdominal cramping     Anxiety     Coronary artery disease     Familial hypercholesterolemia 2014    Essential familial hypercholesterolemia    Hypertension     Presence of intraocular lens 07/10/2014    Pseudophakia, left eye       Past Surgical History:   Procedure Laterality Date    CARDIAC CATHETERIZATION N/A 07/10/2024    Procedure: Left Heart Cath with Coronary Angiography and LV;  Surgeon: Aditya Trejo MD;  Location: [facility] Cardiac Cath Lab;  Service: Cardiovascular;  Laterality: N/A;  7/10/24; 930 am for [facility] [id number], CAD with angina I25.119 and hx stent Z98.61  [facility] medicare:  [facility] # [id number]--gehjh [id number]    CATARACT EXTRACTION      COLONOSCOPY      ESOPHAGOGASTRODUODENOSCOPY         Patient Sexual activity questions deferred to the physician.    Family History   Problem Relation Name Age of Onset    Heart disease Mother      Coronary artery disease Mother      Heart disease Father      Heart attack Father      Hypertension Sister         No Known Allergies    Prior to Admission medications    Medication Sig Start Date End Date Taking? Authorizing Provider   ALPRAZolam (Xanax) 0.5 mg tablet Take 1 tablet (0.5 mg) by mouth 3 times a day as needed for anxiety for up to 7 days. 24  Beba Montgomery, DO   aspirin 81 mg capsule Take by mouth once daily.    Historical Provider, MD   dicyclomine (Bentyl) 10 mg capsule Take 1 capsule (10 mg) by mouth 4 times a day. 7/23/24 10/21/24  Beba Montgomery, DO   metoprolol succinate XL (Toprol-XL) 100 mg 24 hr tablet Take 1 tablet (100 mg) by mouth once daily. 22   Historical  Provider, MD   nitroglycerin (Nitrostat) 0.4 mg SL tablet     Historical Provider, MD   omeprazole (PriLOSEC) 40 mg DR capsule TAKE 1 CAPSULE BY MOUTH TWICE A DAY 8/6/24   Beba Montgomery DO   QUEtiapine (SeroqueL) 50 mg tablet Take 1 tablet (50 mg) by mouth once daily at bedtime. 6/10/24 12/7/24  Beba Montgomery DO   rosuvastatin (Crestor) 40 mg tablet TAKE 1 TABLET BY MOUTH EVERYDAY AT BEDTIME 4/15/24   Beba Montgomery DO   omeprazole (PriLOSEC) 40 mg DR capsule TAKE 1 CAPSULE BY MOUTH TWICE A DAY  Patient not taking: Reported on 7/23/2024 1/31/24 8/6/24  Beba Montgomery DO        PAT ROS:   Constitutional:   neg    Neuro/Psych:   neg    Eyes:   neg    Ears:   Nose:   Mouth:   Throat:   neg    Neck:   neg    Cardio:    chest pain  Respiratory:   neg    Endocrine:   neg    GI:   neg    :   neg    Musculoskeletal:   neg    Hematologic:   neg    Skin:  neg        Physical Exam  Vitals and nursing note reviewed.   Constitutional:       Appearance: Normal appearance.   HENT:      Head: Normocephalic and atraumatic.      Mouth/Throat:      Mouth: Mucous membranes are moist.      Pharynx: Oropharynx is clear.   Eyes:      Extraocular Movements: Extraocular movements intact.      Conjunctiva/sclera: Conjunctivae normal.      Pupils: Pupils are equal, round, and reactive to light.   Cardiovascular:      Rate and Rhythm: Normal rate and regular rhythm.      Pulses: Normal pulses.      Heart sounds: Normal heart sounds.   Pulmonary:      Effort: Pulmonary effort is normal.      Breath sounds: Normal breath sounds.   Abdominal:      General: Abdomen is flat. Bowel sounds are normal.      Palpations: Abdomen is soft.   Musculoskeletal:         General: Normal range of motion.      Cervical back: Normal range of motion and neck supple.   Skin:     General: Skin is warm and dry.   Neurological:      General: No focal deficit present.      Mental Status: She is alert and oriented to person, place, and time.   Psychiatric:          Mood and Affect: Mood normal.         Behavior: Behavior normal.          PAT AIRWAY:   Airway:     Mallampati::  III    TM distance::  >3 FB    Neck ROM::  Full      There were no vitals taken for this visit.    DASI Risk Score    No data to display       Caprini DVT Assessment    No data to display       Modified Frailty Index    No data to display       CHADS2 Stroke Risk  Current as of 29 minutes ago        N/A 3 to 100%: High Risk   2 to < 3%: Medium Risk   0 to < 2%: Low Risk     Last Change: N/A          This score determines the patient's risk of having a stroke if the patient has atrial fibrillation.        This score is not applicable to this patient. Components are not calculated.          Revised Cardiac Risk Index    No data to display       Apfel Simplified Score    No data to display       Risk Analysis Index Results This Encounter    No data found in the last 1 encounters.       7/2024 [facility]  CONCLUSIONS:   1. Double vessel coronary artery disease with proximal left anterior descending involvement.   2. The 1st diagonal branch showed severe ostial atherosclerotic disease.   3. The 1st obtuse marginal branch showed severe proximal atherosclerotic disease.   4. Culprit vessel(s): left anterior descending, 1st diagonal, circumflex and 1st obtuse marginal.   5. Large left subclavian artery gives origin to large LIMA, a suitable graft conduit.    6/2024 Nuclear stress test  Summary:  No chest discomfort or ischemic EKG changes with exercise.  Normal left ventricular systolic function.  No perfusion evidence of scar, or focal ischemia    5/2024 Carotid U/S  Conclusions:  Right internal Carotid Stenosis: 50-69%  Right Vertebral Flow: Antegrade  Left Internal Carotid Stenosis: 20-49%  Left Vertebral Flow: Antegrde  Bilateral Upper Extremity: Findings consistent with a normal interarm systolic pressure gradient.    No results found for this or any previous visit (from the past 24 hour(s)).     Assessment &  Plan:    78 y.o.  female  scheduled for off pump CABG x2 w/ LIMA-LAD and vein graft-OM2 on 8/9 with Dr. Bigg Tellez.  PMHX includes CAD, anxiety.  Presents to [facility] today for perioperative risk stratification and optimization    Neuro:  Anxiety - stable. Medically managed with prn alprazolam. Instructed to hold for 24hrs prior to surgery.      Increased risk for postoperative delirium 2/2 age, type and duration of surgery, Patient instructed on and provided cognitive exercises  Patient is at increased risk for perioperative CVA secondary to  cardiac disease, carotid stenosis, increased age, operative time > 2.5 hours, cardiac surgery    HEENT:  No HEENT diagnosis or significant findings on chart review or clinical presentation and evaluation. No further preoperative testing/intervention indicated at this time.    Cardiovascular:  CAD s/p PCI (2015) with in-stent restenosis - ASA, metoprolol. Endorsing some angina at rest responsive to nitro.    7/2024 LHC: double vessel CAD - 80% LAD, 80% 1st dx, 85% LCx, 80% OM1  6/2024 stress test: normal LV fxn, no EKG changes/perfusion defects    Pending TTE and CT non-con. Assisted in getting scheduled ASAP.  Will obtain preop EKG today.    No further preoperative testing or intervention is indicated at this time.  METS: 9.9  RCRI: N/A  EDWIGE: 1% risk for 30 day postoperative MACE  EKG - 8/2024: NSR    Pulmonary:  No pulmonary diagnosis, however patient is at increased risk of perioperative complications secondary to  age > 60, site of surgery, major surgery, duration of surgery > 2 hours  Stop Bang score is 3 placing patient at intermediate risk for JIMBO  ARISCAT: >45 points, 42.1% risk of in-hospital postoperative pulmonary complication  PRODIGY: High risk for opioid induced respiratory depression    Renal:   No renal diagnosis, however patient is at increase risk for perioperative renal complications secondary to  Age equal to or greater than 56, cerebral vascular disease, cardiac  surgery  Pt at Moderate risk for perioperative NICOLA based on Dynamic Predictive Scoring Tool for Perioperative NICOLA     Endocrine:  No endocrine diagnosis or significant findings on chart review or clinical presentation and evaluation. No further testing or intervention is indicated at this time.    Hematologic:  No hematologic diagnosis or significant findings on chart review or clinical presentation and evaluation. No further testing or intervention is indicated at this time.   Caprini Score 6, patient at High risk for perioperative DVT.  Patient provided with VTE education/handout.    Gastrointestinal:   No GI diagnosis or significant findings on chart review or clinical presentation and evaluation.   Eat-10 score 0  Apfel 2    Infectious disease:   No infectious diagnosis or significant findings on chart review or clinical presentation and evaluation.     Will obtain UA with reflex.     Prescription provided for CHG body wash and dental rinse. CHG use instructions reviewed and provided to patient.  Staph screen: MRSA    Musculoskeletal:   No diagnosis or significant findings on chart review or clinical presentation and evaluation.     Anesthesia/Airway:  No prior anesthesia      Medication instructions and NPO guidelines reviewed with the patient.  All questions or concerns discussed and addressed.      Patient seen and staffed with Dr. Oneal     Attending addendum: Patient seen and evaluated with the resident physician. Discussed perioperative plan with patient and questions answered. Discussed pending tests per CT surgery--will encourage completion prior to surgery. Pre-op EKG ordered for today. Agree with assessment and plan. -JORDYN Oneal, DO

## 2024-08-07 ENCOUNTER — APPOINTMENT (OUTPATIENT)
Dept: RADIOLOGY | Facility: CLINIC | Age: 79
End: 2024-08-07
Payer: MEDICARE

## 2024-08-07 ENCOUNTER — HOSPITAL ENCOUNTER (OUTPATIENT)
Dept: RADIOLOGY | Facility: CLINIC | Age: 79
Discharge: HOME | End: 2024-08-07
Payer: MEDICARE

## 2024-08-07 DIAGNOSIS — I25.10 ARTERIOSCLEROSIS OF CORONARY ARTERY: ICD-10-CM

## 2024-08-07 LAB
AORTIC VALVE MEAN GRADIENT: 2 MMHG
AORTIC VALVE PEAK VELOCITY: 0.97 M/S
ATRIAL RATE: 64 BPM
AV PEAK GRADIENT: 3.8 MMHG
AVA (PEAK VEL): 2.83 CM2
AVA (VTI): 2.83 CM2
EJECTION FRACTION APICAL 4 CHAMBER: 57.4
EJECTION FRACTION: 57 %
HOLD SPECIMEN: NORMAL
LEFT ATRIUM VOLUME AREA LENGTH INDEX BSA: 14.2 ML/M2
LEFT VENTRICLE INTERNAL DIMENSION DIASTOLE: 3.7 CM (ref 3.5–6)
LEFT VENTRICULAR OUTFLOW TRACT DIAMETER: 2.13 CM
MITRAL VALVE E/A RATIO: 0.72
P AXIS: 55 DEGREES
P OFFSET: 192 MS
P ONSET: 132 MS
PR INTERVAL: 194 MS
Q ONSET: 229 MS
QRS COUNT: 11 BEATS
QRS DURATION: 68 MS
QT INTERVAL: 404 MS
QTC CALCULATION(BAZETT): 416 MS
QTC FREDERICIA: 412 MS
R AXIS: -13 DEGREES
RIGHT VENTRICLE PEAK SYSTOLIC PRESSURE: 24.3 MMHG
STAPHYLOCOCCUS SPEC CULT: NORMAL
T AXIS: 56 DEGREES
T OFFSET: 431 MS
TRICUSPID ANNULAR PLANE SYSTOLIC EXCURSION: 1.6 CM
VENTRICULAR RATE: 64 BPM

## 2024-08-07 PROCEDURE — 71250 CT THORAX DX C-: CPT

## 2024-08-07 PROCEDURE — 93005 ELECTROCARDIOGRAM TRACING: CPT

## 2024-08-08 RX ORDER — SEMAGLUTIDE 0.68 MG/ML
0.5 INJECTION, SOLUTION SUBCUTANEOUS
Status: ON HOLD | COMMUNITY

## 2024-08-08 NOTE — PROGRESS NOTES
Pharmacy Medication History Review    Nevaeh Fair is a 78 y.o. female who is planned to be admitted for Coronary artery disease involving native coronary artery of native heart with unstable angina pectoris (Multi). Pharmacy called the patient prior to their scheduled procedure and reviewed the patient's [id number] medications for accuracy.    Medications ADDED:  Ozempic 0.25-0.5mg  Medications CHANGED:  Omeprazole 40mg directions from #1BID to #1QD  Medications REMOVED:   Alprazolam 0.5mg  Dicyclomide 10mg  Metoprolol succinate 100mg    Please review updated prior to admission medication list and comments regarding how patient may be taking medications differently by going to Admission tab --> Admission Orders --> Admit Orders / Review prior to admission medications.     Preferred pharmacy and allergies to be confirmed with patient by nursing the day of procedure.     Sources used to complete the med history include spoke with patient, surescripts, oarrs, care everywhere    Below are additional concerns with the patient's PTA list.  Patient states they do not take alprazolam, dicyclomide, or the oxycodone/apap that was all filled recently.   Patient states they stopped ozempic a week ago in preparation of procedure    Iona Smiley    Meds Amb[facility] Services  Please reach out via Secure Chat for questions

## 2024-08-09 ENCOUNTER — HOSPITAL ENCOUNTER (OUTPATIENT)
Dept: OPERATING ROOM | Facility: HOSPITAL | Age: 79
Discharge: HOME | End: 2024-08-09

## 2024-08-09 ENCOUNTER — ANESTHESIA (OUTPATIENT)
Dept: OPERATING ROOM | Facility: HOSPITAL | Age: 79
End: 2024-08-09
Payer: MEDICARE

## 2024-08-09 ENCOUNTER — HOSPITAL ENCOUNTER (INPATIENT)
Facility: HOSPITAL | Age: 79
End: 2024-08-09
Attending: THORACIC SURGERY (CARDIOTHORACIC VASCULAR SURGERY) | Admitting: THORACIC SURGERY (CARDIOTHORACIC VASCULAR SURGERY)
Payer: MEDICARE

## 2024-08-09 ENCOUNTER — APPOINTMENT (OUTPATIENT)
Dept: RADIOLOGY | Facility: HOSPITAL | Age: 79
DRG: 236 | End: 2024-08-09
Payer: MEDICARE

## 2024-08-09 ENCOUNTER — DOCUMENTATION (OUTPATIENT)
Dept: CARDIOTHORACIC SURGERY | Facility: HOSPITAL | Age: 79
End: 2024-08-09

## 2024-08-09 DIAGNOSIS — Z41.9 ELECTIVE SURGERY: ICD-10-CM

## 2024-08-09 DIAGNOSIS — I25.10 ATHEROSCLEROTIC HEART DISEASE OF NATIVE CORONARY ARTERY WITHOUT ANGINA PECTORIS: ICD-10-CM

## 2024-08-09 DIAGNOSIS — Z95.1 S/P CABG (CORONARY ARTERY BYPASS GRAFT): ICD-10-CM

## 2024-08-09 DIAGNOSIS — I25.110 CORONARY ARTERY DISEASE INVOLVING NATIVE CORONARY ARTERY OF NATIVE HEART WITH UNSTABLE ANGINA PECTORIS (MULTI): Primary | ICD-10-CM

## 2024-08-09 DIAGNOSIS — I25.118 CORONARY ARTERY DISEASE OF NATIVE ARTERY OF NATIVE HEART WITH STABLE ANGINA PECTORIS (CMS-HCC): ICD-10-CM

## 2024-08-09 DIAGNOSIS — I50.9 HEART FAILURE, UNSPECIFIED HF CHRONICITY, UNSPECIFIED HEART FAILURE TYPE (MULTI): ICD-10-CM

## 2024-08-09 DIAGNOSIS — E78.2 MIXED HYPERLIPIDEMIA: ICD-10-CM

## 2024-08-09 DIAGNOSIS — I25.10 3-VESSEL CAD: ICD-10-CM

## 2024-08-09 LAB
ABO GROUP (TYPE) IN BLOOD: NORMAL
ACT BLD: 371 SEC (ref 82–174)
ACT BLD: 383 SEC (ref 82–174)
ACT BLD: 439 SEC (ref 82–174)
ACT BLD: 96 SEC (ref 82–174)
ACT BLD: 96 SEC (ref 82–174)
ALBUMIN SERPL BCP-MCNC: 3.6 G/DL (ref 3.4–5)
ANION GAP BLDA CALCULATED.4IONS-SCNC: 11 MMO/L (ref 10–25)
ANION GAP BLDA CALCULATED.4IONS-SCNC: 12 MMO/L (ref 10–25)
ANION GAP BLDA CALCULATED.4IONS-SCNC: 9 MMO/L (ref 10–25)
ANION GAP BLDA CALCULATED.4IONS-SCNC: 9 MMO/L (ref 10–25)
ANION GAP BLDA CALCULATED.4IONS-SCNC: ABNORMAL MMOL/L
ANION GAP SERPL CALC-SCNC: 14 MMOL/L (ref 10–20)
ANTIBODY SCREEN: NORMAL
APTT PPP: 23 SECONDS (ref 27–38)
BASE EXCESS BLDA CALC-SCNC: -0.4 MMOL/L (ref -2–3)
BASE EXCESS BLDA CALC-SCNC: -1.2 MMOL/L (ref -2–3)
BASE EXCESS BLDA CALC-SCNC: -1.3 MMOL/L (ref -2–3)
BASE EXCESS BLDA CALC-SCNC: -1.8 MMOL/L (ref -2–3)
BASE EXCESS BLDA CALC-SCNC: -3.2 MMOL/L (ref -2–3)
BASE EXCESS BLDA CALC-SCNC: 0.5 MMOL/L (ref -2–3)
BASE EXCESS BLDA CALC-SCNC: 0.8 MMOL/L (ref -2–3)
BASE EXCESS BLDA CALC-SCNC: 1.9 MMOL/L (ref -2–3)
BODY TEMPERATURE: 37 DEGREES CELSIUS
BUN SERPL-MCNC: 20 MG/DL (ref 6–23)
CA-I BLD-SCNC: 1.14 MMOL/L (ref 1.1–1.33)
CA-I BLDA-SCNC: 0.99 MMOL/L (ref 1.1–1.33)
CA-I BLDA-SCNC: 1.09 MMOL/L (ref 1.1–1.33)
CA-I BLDA-SCNC: 1.12 MMOL/L (ref 1.1–1.33)
CA-I BLDA-SCNC: 1.13 MMOL/L (ref 1.1–1.33)
CA-I BLDA-SCNC: 1.16 MMOL/L (ref 1.1–1.33)
CA-I BLDA-SCNC: 1.17 MMOL/L (ref 1.1–1.33)
CA-I BLDA-SCNC: 1.19 MMOL/L (ref 1.1–1.33)
CA-I BLDA-SCNC: 1.24 MMOL/L (ref 1.1–1.33)
CALCIUM SERPL-MCNC: 8.2 MG/DL (ref 8.6–10.6)
CFT FORM KAOLIN IND BLD RES TEG: 1 MIN (ref 0.8–2.1)
CFT FORM KAOLIN IND BLD RES TEG: 1.2 MIN (ref 0.8–2.1)
CHLORIDE BLDA-SCNC: 104 MMOL/L (ref 98–107)
CHLORIDE BLDA-SCNC: 104 MMOL/L (ref 98–107)
CHLORIDE BLDA-SCNC: 105 MMOL/L (ref 98–107)
CHLORIDE BLDA-SCNC: 106 MMOL/L (ref 98–107)
CHLORIDE BLDA-SCNC: 106 MMOL/L (ref 98–107)
CHLORIDE BLDA-SCNC: 107 MMOL/L (ref 98–107)
CHLORIDE BLDA-SCNC: 107 MMOL/L (ref 98–107)
CHLORIDE BLDA-SCNC: ABNORMAL MMOL/L
CHLORIDE SERPL-SCNC: 106 MMOL/L (ref 98–107)
CLOT ANGLE.KAOLIN INDUCED BLD RES TEG: 73 DEG (ref 63–78)
CLOT ANGLE.KAOLIN INDUCED BLD RES TEG: 76 DEG (ref 63–78)
CLOT INIT KAO IND P HEP NEUT BLD RES TEG: 4.7 MIN (ref 4.3–8.3)
CLOT INIT KAO IND P HEP NEUT BLD RES TEG: 5.2 MIN (ref 4.6–9.1)
CLOT INIT KAO IND P HEP NEUT BLD RES TEG: 6.7 MIN (ref 4.3–8.3)
CLOT INIT KAO IND P HEP NEUT BLD RES TEG: 7.2 MIN (ref 4.6–9.1)
CO2 SERPL-SCNC: 25 MMOL/L (ref 21–32)
COHGB MFR BLDA: 1.2 %
COHGB MFR BLDA: 1.2 %
COHGB MFR BLDA: 1.3 %
COHGB MFR BLDA: 1.4 %
COHGB MFR BLDA: 1.4 %
COHGB MFR BLDA: 1.7 %
CREAT SERPL-MCNC: 0.65 MG/DL (ref 0.5–1.05)
DO-HGB MFR BLDA: 0 % (ref 0–5)
DO-HGB MFR BLDA: 0 % (ref 0–5)
DO-HGB MFR BLDA: 0.3 % (ref 0–5)
DO-HGB MFR BLDA: 0.4 % (ref 0–5)
DO-HGB MFR BLDA: 0.5 % (ref 0–5)
DO-HGB MFR BLDA: 0.6 % (ref 0–5)
EGFRCR SERPLBLD CKD-EPI 2021: 90 ML/MIN/1.73M*2
EJECTION FRACTION: 58 %
ERYTHROCYTE [DISTWIDTH] IN BLOOD BY AUTOMATED COUNT: 14 % (ref 11.5–14.5)
FIBRINOGEN BLD CALC-MCNC: 294 MG/DL (ref 278–581)
FIBRINOGEN BLD CALC-MCNC: 369 MG/DL (ref 278–581)
FIBRINOGEN PPP-MCNC: 152 MG/DL (ref 200–400)
GLUCOSE BLD MANUAL STRIP-MCNC: 105 MG/DL (ref 74–99)
GLUCOSE BLDA-MCNC: 113 MG/DL (ref 74–99)
GLUCOSE BLDA-MCNC: 119 MG/DL (ref 74–99)
GLUCOSE BLDA-MCNC: 126 MG/DL (ref 74–99)
GLUCOSE BLDA-MCNC: 128 MG/DL (ref 74–99)
GLUCOSE BLDA-MCNC: 130 MG/DL (ref 74–99)
GLUCOSE BLDA-MCNC: 133 MG/DL (ref 74–99)
GLUCOSE BLDA-MCNC: 135 MG/DL (ref 74–99)
GLUCOSE BLDA-MCNC: 149 MG/DL (ref 74–99)
GLUCOSE BLDA-MCNC: 154 MG/DL (ref 74–99)
GLUCOSE BLDA-MCNC: 161 MG/DL (ref 74–99)
GLUCOSE SERPL-MCNC: 108 MG/DL (ref 74–99)
HCO3 BLDA-SCNC: 22 MMOL/L (ref 22–26)
HCO3 BLDA-SCNC: 23.1 MMOL/L (ref 22–26)
HCO3 BLDA-SCNC: 24.3 MMOL/L (ref 22–26)
HCO3 BLDA-SCNC: 24.3 MMOL/L (ref 22–26)
HCO3 BLDA-SCNC: 24.5 MMOL/L (ref 22–26)
HCO3 BLDA-SCNC: 24.8 MMOL/L (ref 22–26)
HCO3 BLDA-SCNC: 25.4 MMOL/L (ref 22–26)
HCO3 BLDA-SCNC: 25.4 MMOL/L (ref 22–26)
HCO3 BLDA-SCNC: 25.6 MMOL/L (ref 22–26)
HCO3 BLDA-SCNC: 26.6 MMOL/L (ref 22–26)
HCT VFR BLD AUTO: 26.4 % (ref 36–46)
HCT VFR BLD EST: 26 % (ref 36–46)
HCT VFR BLD EST: 27 % (ref 36–46)
HCT VFR BLD EST: 28 % (ref 36–46)
HCT VFR BLD EST: 29 % (ref 36–46)
HCT VFR BLD EST: 29 % (ref 36–46)
HCT VFR BLD EST: 30 % (ref 36–46)
HCT VFR BLD EST: 34 % (ref 36–46)
HCT VFR BLD EST: 37 % (ref 36–46)
HGB BLD-MCNC: 9.3 G/DL (ref 12–16)
HGB BLDA-MCNC: 10 G/DL (ref 12–16)
HGB BLDA-MCNC: 11.4 G/DL (ref 12–16)
HGB BLDA-MCNC: 11.4 G/DL (ref 12–16)
HGB BLDA-MCNC: 12.4 G/DL (ref 12–16)
HGB BLDA-MCNC: 12.4 G/DL (ref 12–16)
HGB BLDA-MCNC: 8.8 G/DL (ref 12–16)
HGB BLDA-MCNC: 9.1 G/DL (ref 12–16)
HGB BLDA-MCNC: 9.3 G/DL (ref 12–16)
HGB BLDA-MCNC: 9.3 G/DL (ref 12–16)
HGB BLDA-MCNC: 9.5 G/DL (ref 12–16)
HGB BLDA-MCNC: 9.8 G/DL (ref 12–16)
HGB BLDA-MCNC: 9.8 G/DL (ref 12–16)
HGB BLDA-MCNC: 9.9 G/DL (ref 12–16)
HGB BLDA-MCNC: 9.9 G/DL (ref 12–16)
INHALED O2 CONCENTRATION: 30 %
INHALED O2 CONCENTRATION: 30 %
INHALED O2 CONCENTRATION: 40 %
INHALED O2 CONCENTRATION: 40 %
INHALED O2 CONCENTRATION: 50 %
INR PPP: 1.1 (ref 0.9–1.1)
LACTATE BLDA-SCNC: 1.2 MMOL/L (ref 0.4–2)
LACTATE BLDA-SCNC: 1.3 MMOL/L (ref 0.4–2)
LACTATE BLDA-SCNC: 1.5 MMOL/L (ref 0.4–2)
LACTATE BLDA-SCNC: 1.7 MMOL/L (ref 0.4–2)
LACTATE BLDA-SCNC: 1.8 MMOL/L (ref 0.4–2)
LACTATE BLDA-SCNC: 2 MMOL/L (ref 0.4–2)
LACTATE BLDA-SCNC: 2.4 MMOL/L (ref 0.4–2)
LACTATE BLDA-SCNC: 2.4 MMOL/L (ref 0.4–2)
MA KAOLIN BLD RES TEG: 54 MM (ref 52–69)
MA KAOLIN BLD RES TEG: 60 MM (ref 52–69)
MA KAOLIN+TF BLD RES TEG: 53 MM (ref 52–70)
MA KAOLIN+TF BLD RES TEG: 60 MM (ref 52–70)
MA TF IND+IIB-IIIA INH BLD RES TEG: 16 MM (ref 15–32)
MA TF IND+IIB-IIIA INH BLD RES TEG: 20 MM (ref 15–32)
MAGNESIUM SERPL-MCNC: 2.11 MG/DL (ref 1.6–2.4)
MCH RBC QN AUTO: 32.9 PG (ref 26–34)
MCHC RBC AUTO-ENTMCNC: 35.2 G/DL (ref 32–36)
MCV RBC AUTO: 93 FL (ref 80–100)
METHGB MFR BLDA: 0 % (ref 0–1.5)
METHGB MFR BLDA: 0.3 % (ref 0–1.5)
METHGB MFR BLDA: 0.4 % (ref 0–1.5)
NRBC BLD-RTO: 0 /100 WBCS (ref 0–0)
OXYHGB MFR BLDA: 96.7 % (ref 94–98)
OXYHGB MFR BLDA: 97 % (ref 94–98)
OXYHGB MFR BLDA: 97.4 % (ref 94–98)
OXYHGB MFR BLDA: 97.6 % (ref 94–98)
OXYHGB MFR BLDA: 97.7 % (ref 94–98)
OXYHGB MFR BLDA: 97.7 % (ref 94–98)
OXYHGB MFR BLDA: 97.9 % (ref 94–98)
OXYHGB MFR BLDA: 98.2 % (ref 94–98)
OXYHGB MFR BLDA: 98.2 % (ref 94–98)
OXYHGB MFR BLDA: 98.4 % (ref 94–98)
OXYHGB MFR BLDA: 98.4 % (ref 94–98)
PCO2 BLDA: 35 MM HG (ref 38–42)
PCO2 BLDA: 36 MM HG (ref 38–42)
PCO2 BLDA: 36 MM HG (ref 38–42)
PCO2 BLDA: 39 MM HG (ref 38–42)
PCO2 BLDA: 39 MM HG (ref 38–42)
PCO2 BLDA: 41 MM HG (ref 38–42)
PCO2 BLDA: 43 MM HG (ref 38–42)
PCO2 BLDA: 46 MM HG (ref 38–42)
PCO2 BLDA: 47 MM HG (ref 38–42)
PCO2 BLDA: 47 MM HG (ref 38–42)
PH BLDA: 7.33 PH (ref 7.38–7.42)
PH BLDA: 7.35 PH (ref 7.38–7.42)
PH BLDA: 7.36 PH (ref 7.38–7.42)
PH BLDA: 7.38 PH (ref 7.38–7.42)
PH BLDA: 7.4 PH (ref 7.38–7.42)
PH BLDA: 7.44 PH (ref 7.38–7.42)
PH BLDA: 7.45 PH (ref 7.38–7.42)
PH BLDA: 7.46 PH (ref 7.38–7.42)
PHOSPHATE SERPL-MCNC: 4.5 MG/DL (ref 2.5–4.9)
PLATELET # BLD AUTO: 150 X10*3/UL (ref 150–450)
PO2 BLDA: 114 MM HG (ref 85–95)
PO2 BLDA: 125 MM HG (ref 85–95)
PO2 BLDA: 149 MM HG (ref 85–95)
PO2 BLDA: 155 MM HG (ref 85–95)
PO2 BLDA: 176 MM HG (ref 85–95)
PO2 BLDA: 192 MM HG (ref 85–95)
PO2 BLDA: 212 MM HG (ref 85–95)
PO2 BLDA: 228 MM HG (ref 85–95)
PO2 BLDA: 92 MM HG (ref 85–95)
PO2 BLDA: 97 MM HG (ref 85–95)
POTASSIUM BLDA-SCNC: 3.7 MMOL/L (ref 3.5–5.3)
POTASSIUM BLDA-SCNC: 3.8 MMOL/L (ref 3.5–5.3)
POTASSIUM BLDA-SCNC: 3.9 MMOL/L (ref 3.5–5.3)
POTASSIUM BLDA-SCNC: 4.1 MMOL/L (ref 3.5–5.3)
POTASSIUM BLDA-SCNC: 4.2 MMOL/L (ref 3.5–5.3)
POTASSIUM BLDA-SCNC: 4.2 MMOL/L (ref 3.5–5.3)
POTASSIUM BLDA-SCNC: 4.4 MMOL/L (ref 3.5–5.3)
POTASSIUM BLDA-SCNC: 4.4 MMOL/L (ref 3.5–5.3)
POTASSIUM SERPL-SCNC: 4 MMOL/L (ref 3.5–5.3)
PROTHROMBIN TIME: 12.5 SECONDS (ref 9.8–12.8)
RBC # BLD AUTO: 2.83 X10*6/UL (ref 4–5.2)
RH FACTOR (ANTIGEN D): NORMAL
RIGHT VENTRICLE PEAK SYSTOLIC PRESSURE: 20.6 MMHG
SAO2 % BLDA: 100 % (ref 94–100)
SAO2 % BLDA: 99 % (ref 94–100)
SODIUM BLDA-SCNC: 136 MMOL/L (ref 136–145)
SODIUM BLDA-SCNC: 137 MMOL/L (ref 136–145)
SODIUM BLDA-SCNC: 138 MMOL/L (ref 136–145)
SODIUM BLDA-SCNC: 138 MMOL/L (ref 136–145)
SODIUM SERPL-SCNC: 141 MMOL/L (ref 136–145)
TEST COMMENT: NORMAL
TEST COMMENT: NORMAL
WBC # BLD AUTO: 12.3 X10*3/UL (ref 4.4–11.3)

## 2024-08-09 PROCEDURE — P9045 ALBUMIN (HUMAN), 5%, 250 ML: HCPCS | Mod: JZ

## 2024-08-09 PROCEDURE — 2500000002 HC RX 250 W HCPCS SELF ADMINISTERED DRUGS (ALT 637 FOR MEDICARE OP, ALT 636 FOR OP/ED): Performed by: STUDENT IN AN ORGANIZED HEALTH CARE EDUCATION/TRAINING PROGRAM

## 2024-08-09 PROCEDURE — 3700000001 HC GENERAL ANESTHESIA TIME - INITIAL BASE CHARGE: Performed by: THORACIC SURGERY (CARDIOTHORACIC VASCULAR SURGERY)

## 2024-08-09 PROCEDURE — 06BP4ZZ EXCISION OF RIGHT SAPHENOUS VEIN, PERCUTANEOUS ENDOSCOPIC APPROACH: ICD-10-PCS | Performed by: THORACIC SURGERY (CARDIOTHORACIC VASCULAR SURGERY)

## 2024-08-09 PROCEDURE — 2500000002 HC RX 250 W HCPCS SELF ADMINISTERED DRUGS (ALT 637 FOR MEDICARE OP, ALT 636 FOR OP/ED): Performed by: NURSE PRACTITIONER

## 2024-08-09 PROCEDURE — 83050 HGB METHEMOGLOBIN QUAN: CPT

## 2024-08-09 PROCEDURE — 71045 X-RAY EXAM CHEST 1 VIEW: CPT | Performed by: STUDENT IN AN ORGANIZED HEALTH CARE EDUCATION/TRAINING PROGRAM

## 2024-08-09 PROCEDURE — 82947 ASSAY GLUCOSE BLOOD QUANT: CPT

## 2024-08-09 PROCEDURE — 85347 COAGULATION TIME ACTIVATED: CPT

## 2024-08-09 PROCEDURE — 71045 X-RAY EXAM CHEST 1 VIEW: CPT | Performed by: RADIOLOGY

## 2024-08-09 PROCEDURE — 99291 CRITICAL CARE FIRST HOUR: CPT | Performed by: ANESTHESIOLOGY

## 2024-08-09 PROCEDURE — 2500000004 HC RX 250 GENERAL PHARMACY W/ HCPCS (ALT 636 FOR OP/ED): Performed by: NURSE PRACTITIONER

## 2024-08-09 PROCEDURE — 2500000004 HC RX 250 GENERAL PHARMACY W/ HCPCS (ALT 636 FOR OP/ED)

## 2024-08-09 PROCEDURE — 2500000004 HC RX 250 GENERAL PHARMACY W/ HCPCS (ALT 636 FOR OP/ED): Performed by: THORACIC SURGERY (CARDIOTHORACIC VASCULAR SURGERY)

## 2024-08-09 PROCEDURE — 33533 CABG ARTERIAL SINGLE: CPT | Performed by: THORACIC SURGERY (CARDIOTHORACIC VASCULAR SURGERY)

## 2024-08-09 PROCEDURE — 5A1221Z PERFORMANCE OF CARDIAC OUTPUT, CONTINUOUS: ICD-10-PCS | Performed by: THORACIC SURGERY (CARDIOTHORACIC VASCULAR SURGERY)

## 2024-08-09 PROCEDURE — 85384 FIBRINOGEN ACTIVITY: CPT | Performed by: NURSE PRACTITIONER

## 2024-08-09 PROCEDURE — 3600000012 HC PERFUSION TIME - EACH INCREMENTAL 1 MINUTE: Performed by: THORACIC SURGERY (CARDIOTHORACIC VASCULAR SURGERY)

## 2024-08-09 PROCEDURE — 2020000001 HC ICU ROOM DAILY

## 2024-08-09 PROCEDURE — 2720000007 HC OR 272 NO HCPCS: Performed by: THORACIC SURGERY (CARDIOTHORACIC VASCULAR SURGERY)

## 2024-08-09 PROCEDURE — 82330 ASSAY OF CALCIUM: CPT | Performed by: NURSE PRACTITIONER

## 2024-08-09 PROCEDURE — 85610 PROTHROMBIN TIME: CPT | Performed by: NURSE PRACTITIONER

## 2024-08-09 PROCEDURE — A4649 SURGICAL SUPPLIES: HCPCS | Performed by: THORACIC SURGERY (CARDIOTHORACIC VASCULAR SURGERY)

## 2024-08-09 PROCEDURE — 3700000002 HC GENERAL ANESTHESIA TIME - EACH INCREMENTAL 1 MINUTE: Performed by: THORACIC SURGERY (CARDIOTHORACIC VASCULAR SURGERY)

## 2024-08-09 PROCEDURE — 85384 FIBRINOGEN ACTIVITY: CPT

## 2024-08-09 PROCEDURE — 02100Z9 BYPASS CORONARY ARTERY, ONE ARTERY FROM LEFT INTERNAL MAMMARY, OPEN APPROACH: ICD-10-PCS | Performed by: THORACIC SURGERY (CARDIOTHORACIC VASCULAR SURGERY)

## 2024-08-09 PROCEDURE — 82375 ASSAY CARBOXYHB QUANT: CPT

## 2024-08-09 PROCEDURE — 86901 BLOOD TYPING SEROLOGIC RH(D): CPT | Performed by: THORACIC SURGERY (CARDIOTHORACIC VASCULAR SURGERY)

## 2024-08-09 PROCEDURE — 2500000005 HC RX 250 GENERAL PHARMACY W/O HCPCS: Performed by: NURSE PRACTITIONER

## 2024-08-09 PROCEDURE — 2780000003 HC OR 278 NO HCPCS: Performed by: THORACIC SURGERY (CARDIOTHORACIC VASCULAR SURGERY)

## 2024-08-09 PROCEDURE — 73551 X-RAY EXAM OF FEMUR 1: CPT | Performed by: RADIOLOGY

## 2024-08-09 PROCEDURE — 33508 ENDOSCOPIC VEIN HARVEST: CPT | Performed by: THORACIC SURGERY (CARDIOTHORACIC VASCULAR SURGERY)

## 2024-08-09 PROCEDURE — 94002 VENT MGMT INPAT INIT DAY: CPT

## 2024-08-09 PROCEDURE — 2500000005 HC RX 250 GENERAL PHARMACY W/O HCPCS

## 2024-08-09 PROCEDURE — 37799 UNLISTED PX VASCULAR SURGERY: CPT | Performed by: NURSE PRACTITIONER

## 2024-08-09 PROCEDURE — 2500000001 HC RX 250 WO HCPCS SELF ADMINISTERED DRUGS (ALT 637 FOR MEDICARE OP): Performed by: NURSE PRACTITIONER

## 2024-08-09 PROCEDURE — 83735 ASSAY OF MAGNESIUM: CPT | Performed by: NURSE PRACTITIONER

## 2024-08-09 PROCEDURE — P9045 ALBUMIN (HUMAN), 5%, 250 ML: HCPCS | Mod: JZ | Performed by: NURSE PRACTITIONER

## 2024-08-09 PROCEDURE — 2500000002 HC RX 250 W HCPCS SELF ADMINISTERED DRUGS (ALT 637 FOR MEDICARE OP, ALT 636 FOR OP/ED)

## 2024-08-09 PROCEDURE — 71045 X-RAY EXAM CHEST 1 VIEW: CPT

## 2024-08-09 PROCEDURE — 84132 ASSAY OF SERUM POTASSIUM: CPT | Performed by: NURSE PRACTITIONER

## 2024-08-09 PROCEDURE — 3600000017 HC OR TIME - EACH INCREMENTAL 1 MINUTE - PROCEDURE LEVEL SIX: Performed by: THORACIC SURGERY (CARDIOTHORACIC VASCULAR SURGERY)

## 2024-08-09 PROCEDURE — 73551 X-RAY EXAM OF FEMUR 1: CPT | Mod: RT

## 2024-08-09 PROCEDURE — 2500000005 HC RX 250 GENERAL PHARMACY W/O HCPCS: Performed by: THORACIC SURGERY (CARDIOTHORACIC VASCULAR SURGERY)

## 2024-08-09 PROCEDURE — 021009W BYPASS CORONARY ARTERY, ONE ARTERY FROM AORTA WITH AUTOLOGOUS VENOUS TISSUE, OPEN APPROACH: ICD-10-PCS | Performed by: THORACIC SURGERY (CARDIOTHORACIC VASCULAR SURGERY)

## 2024-08-09 PROCEDURE — 3600000018 HC OR TIME - INITIAL BASE CHARGE - PROCEDURE LEVEL SIX: Performed by: THORACIC SURGERY (CARDIOTHORACIC VASCULAR SURGERY)

## 2024-08-09 PROCEDURE — 85027 COMPLETE CBC AUTOMATED: CPT | Performed by: NURSE PRACTITIONER

## 2024-08-09 PROCEDURE — A4312 CATH W/O BAG 2-WAY SILICONE: HCPCS | Performed by: THORACIC SURGERY (CARDIOTHORACIC VASCULAR SURGERY)

## 2024-08-09 PROCEDURE — 86923 COMPATIBILITY TEST ELECTRIC: CPT

## 2024-08-09 PROCEDURE — 84132 ASSAY OF SERUM POTASSIUM: CPT

## 2024-08-09 PROCEDURE — 33518 CABG ARTERY-VEIN TWO: CPT | Performed by: THORACIC SURGERY (CARDIOTHORACIC VASCULAR SURGERY)

## 2024-08-09 PROCEDURE — 3600000011 HC PERFUSION TIME - INITIAL BASE CHARGE: Performed by: THORACIC SURGERY (CARDIOTHORACIC VASCULAR SURGERY)

## 2024-08-09 PROCEDURE — 36415 COLL VENOUS BLD VENIPUNCTURE: CPT | Performed by: THORACIC SURGERY (CARDIOTHORACIC VASCULAR SURGERY)

## 2024-08-09 DEVICE — STABILIZER TS2000 OCTOPUS EVOLUTION 16L
Type: IMPLANTABLE DEVICE | Site: HEART | Status: FUNCTIONAL
Brand: OCTOPUS®

## 2024-08-09 RX ORDER — NITROGLYCERIN 20 MG/100ML
INJECTION INTRAVENOUS AS NEEDED
Status: DISCONTINUED | OUTPATIENT
Start: 2024-08-09 | End: 2024-08-09

## 2024-08-09 RX ORDER — CALCIUM GLUCONATE 20 MG/ML
1 INJECTION, SOLUTION INTRAVENOUS EVERY 6 HOURS PRN
Status: DISCONTINUED | OUTPATIENT
Start: 2024-08-09 | End: 2024-08-10

## 2024-08-09 RX ORDER — NITROGLYCERIN 40 MG/100ML
INJECTION INTRAVENOUS AS NEEDED
Status: DISCONTINUED | OUTPATIENT
Start: 2024-08-09 | End: 2024-08-09

## 2024-08-09 RX ORDER — MAGNESIUM SULFATE HEPTAHYDRATE 40 MG/ML
4 INJECTION, SOLUTION INTRAVENOUS EVERY 6 HOURS PRN
Status: DISCONTINUED | OUTPATIENT
Start: 2024-08-09 | End: 2024-08-10

## 2024-08-09 RX ORDER — DEXTROSE 50 % IN WATER (D50W) INTRAVENOUS SYRINGE
25
Status: DISCONTINUED | OUTPATIENT
Start: 2024-08-09 | End: 2024-08-11

## 2024-08-09 RX ORDER — FENTANYL CITRATE 50 UG/ML
INJECTION, SOLUTION INTRAMUSCULAR; INTRAVENOUS AS NEEDED
Status: DISCONTINUED | OUTPATIENT
Start: 2024-08-09 | End: 2024-08-09

## 2024-08-09 RX ORDER — POTASSIUM CHLORIDE 1.5 G/1.58G
40 POWDER, FOR SOLUTION ORAL EVERY 6 HOURS PRN
Status: DISCONTINUED | OUTPATIENT
Start: 2024-08-09 | End: 2024-08-10

## 2024-08-09 RX ORDER — SODIUM CHLORIDE, SODIUM LACTATE, POTASSIUM CHLORIDE, CALCIUM CHLORIDE 600; 310; 30; 20 MG/100ML; MG/100ML; MG/100ML; MG/100ML
5 INJECTION, SOLUTION INTRAVENOUS CONTINUOUS
Status: DISCONTINUED | OUTPATIENT
Start: 2024-08-09 | End: 2024-08-10

## 2024-08-09 RX ORDER — OXYCODONE HCL 5 MG/5 ML
10 SOLUTION, ORAL ORAL ONCE
Status: DISCONTINUED | OUTPATIENT
Start: 2024-08-09 | End: 2024-08-10

## 2024-08-09 RX ORDER — POTASSIUM CHLORIDE 20 MEQ/1
20 TABLET, EXTENDED RELEASE ORAL EVERY 6 HOURS PRN
Status: DISCONTINUED | OUTPATIENT
Start: 2024-08-09 | End: 2024-08-10

## 2024-08-09 RX ORDER — CALCIUM CHLORIDE INJECTION 100 MG/ML
INJECTION, SOLUTION INTRAVENOUS AS NEEDED
Status: DISCONTINUED | OUTPATIENT
Start: 2024-08-09 | End: 2024-08-09

## 2024-08-09 RX ORDER — PANTOPRAZOLE SODIUM 40 MG/1
40 TABLET, DELAYED RELEASE ORAL
Status: DISPENSED | OUTPATIENT
Start: 2024-08-10

## 2024-08-09 RX ORDER — ALBUMIN HUMAN 50 G/1000ML
25 SOLUTION INTRAVENOUS ONCE
Status: COMPLETED | OUTPATIENT
Start: 2024-08-09 | End: 2024-08-09

## 2024-08-09 RX ORDER — OXYCODONE HYDROCHLORIDE 5 MG/1
10 TABLET ORAL EVERY 4 HOURS PRN
Status: DISCONTINUED | OUTPATIENT
Start: 2024-08-09 | End: 2024-08-11

## 2024-08-09 RX ORDER — SODIUM CHLORIDE, SODIUM LACTATE, POTASSIUM CHLORIDE, CALCIUM CHLORIDE 600; 310; 30; 20 MG/100ML; MG/100ML; MG/100ML; MG/100ML
20 INJECTION, SOLUTION INTRAVENOUS CONTINUOUS
Status: DISCONTINUED | OUTPATIENT
Start: 2024-08-09 | End: 2024-08-09

## 2024-08-09 RX ORDER — NOREPINEPHRINE BITARTRATE 0.03 MG/ML
INJECTION, SOLUTION INTRAVENOUS CONTINUOUS PRN
Status: DISCONTINUED | OUTPATIENT
Start: 2024-08-09 | End: 2024-08-09

## 2024-08-09 RX ORDER — HEPARIN SODIUM 1000 [USP'U]/ML
INJECTION, SOLUTION INTRAVENOUS; SUBCUTANEOUS AS NEEDED
Status: DISCONTINUED | OUTPATIENT
Start: 2024-08-09 | End: 2024-08-09

## 2024-08-09 RX ORDER — CALCIUM GLUCONATE 20 MG/ML
2 INJECTION, SOLUTION INTRAVENOUS EVERY 6 HOURS PRN
Status: DISCONTINUED | OUTPATIENT
Start: 2024-08-09 | End: 2024-08-10

## 2024-08-09 RX ORDER — INSULIN LISPRO 100 [IU]/ML
0-15 INJECTION, SOLUTION INTRAVENOUS; SUBCUTANEOUS EVERY 4 HOURS
Status: DISCONTINUED | OUTPATIENT
Start: 2024-08-09 | End: 2024-08-10

## 2024-08-09 RX ORDER — POTASSIUM CHLORIDE 29.8 MG/ML
40 INJECTION INTRAVENOUS EVERY 6 HOURS PRN
Status: DISCONTINUED | OUTPATIENT
Start: 2024-08-09 | End: 2024-08-10

## 2024-08-09 RX ORDER — OXYCODONE HYDROCHLORIDE 5 MG/1
5 TABLET ORAL EVERY 4 HOURS PRN
Status: DISCONTINUED | OUTPATIENT
Start: 2024-08-09 | End: 2024-08-11

## 2024-08-09 RX ORDER — LIDOCAINE HYDROCHLORIDE 20 MG/ML
INJECTION, SOLUTION INFILTRATION; PERINEURAL AS NEEDED
Status: DISCONTINUED | OUTPATIENT
Start: 2024-08-09 | End: 2024-08-09

## 2024-08-09 RX ORDER — EPINEPHRINE HCL IN DEXTROSE 5% 4MG/250ML
0-1 PLASTIC BAG, INJECTION (ML) INTRAVENOUS CONTINUOUS
Status: DISCONTINUED | OUTPATIENT
Start: 2024-08-09 | End: 2024-08-09

## 2024-08-09 RX ORDER — NOREPINEPHRINE BITARTRATE/D5W 8 MG/250ML
0-1 PLASTIC BAG, INJECTION (ML) INTRAVENOUS CONTINUOUS
Status: DISCONTINUED | OUTPATIENT
Start: 2024-08-09 | End: 2024-08-09

## 2024-08-09 RX ORDER — ACETAMINOPHEN 325 MG/1
650 TABLET ORAL EVERY 6 HOURS
Status: DISPENSED | OUTPATIENT
Start: 2024-08-09 | End: 2024-08-13

## 2024-08-09 RX ORDER — ROSUVASTATIN CALCIUM 40 MG/1
40 TABLET, COATED ORAL NIGHTLY
Status: DISCONTINUED | OUTPATIENT
Start: 2024-08-09 | End: 2024-08-10

## 2024-08-09 RX ORDER — PAPAVERINE HYDROCHLORIDE 30 MG/ML
INJECTION INTRAMUSCULAR; INTRAVENOUS AS NEEDED
Status: DISCONTINUED | OUTPATIENT
Start: 2024-08-09 | End: 2024-08-09 | Stop reason: HOSPADM

## 2024-08-09 RX ORDER — SODIUM CHLORIDE, SODIUM GLUCONATE, SODIUM ACETATE, POTASSIUM CHLORIDE AND MAGNESIUM CHLORIDE 30; 37; 368; 526; 502 MG/100ML; MG/100ML; MG/100ML; MG/100ML; MG/100ML
INJECTION, SOLUTION INTRAVENOUS CONTINUOUS PRN
Status: DISCONTINUED | OUTPATIENT
Start: 2024-08-09 | End: 2024-08-09

## 2024-08-09 RX ORDER — QUETIAPINE FUMARATE 25 MG/1
25 TABLET, FILM COATED ORAL NIGHTLY
Status: DISPENSED | OUTPATIENT
Start: 2024-08-09

## 2024-08-09 RX ORDER — CEFAZOLIN 1 G/1
INJECTION, POWDER, FOR SOLUTION INTRAVENOUS AS NEEDED
Status: DISCONTINUED | OUTPATIENT
Start: 2024-08-09 | End: 2024-08-09

## 2024-08-09 RX ORDER — PROPOFOL 10 MG/ML
INJECTION, EMULSION INTRAVENOUS AS NEEDED
Status: DISCONTINUED | OUTPATIENT
Start: 2024-08-09 | End: 2024-08-09

## 2024-08-09 RX ORDER — ATORVASTATIN CALCIUM 80 MG/1
80 TABLET, FILM COATED ORAL NIGHTLY
Status: DISCONTINUED | OUTPATIENT
Start: 2024-08-10 | End: 2024-08-09

## 2024-08-09 RX ORDER — POTASSIUM CHLORIDE 20 MEQ/1
40 TABLET, EXTENDED RELEASE ORAL EVERY 6 HOURS PRN
Status: DISCONTINUED | OUTPATIENT
Start: 2024-08-09 | End: 2024-08-10

## 2024-08-09 RX ORDER — NAPROXEN SODIUM 220 MG/1
81 TABLET, FILM COATED ORAL DAILY
Status: DISCONTINUED | OUTPATIENT
Start: 2024-08-09 | End: 2024-08-11

## 2024-08-09 RX ORDER — POTASSIUM CHLORIDE 1.5 G/1.58G
20 POWDER, FOR SOLUTION ORAL EVERY 6 HOURS PRN
Status: DISCONTINUED | OUTPATIENT
Start: 2024-08-09 | End: 2024-08-10

## 2024-08-09 RX ORDER — HYDROMORPHONE HYDROCHLORIDE 1 MG/ML
0.2 INJECTION, SOLUTION INTRAMUSCULAR; INTRAVENOUS; SUBCUTANEOUS
Status: DISCONTINUED | OUTPATIENT
Start: 2024-08-09 | End: 2024-08-10

## 2024-08-09 RX ORDER — VANCOMYCIN HYDROCHLORIDE 1 G/20ML
INJECTION, POWDER, LYOPHILIZED, FOR SOLUTION INTRAVENOUS AS NEEDED
Status: DISCONTINUED | OUTPATIENT
Start: 2024-08-09 | End: 2024-08-09 | Stop reason: HOSPADM

## 2024-08-09 RX ORDER — SODIUM CHLORIDE 0.9 G/100ML
IRRIGANT IRRIGATION AS NEEDED
Status: DISCONTINUED | OUTPATIENT
Start: 2024-08-09 | End: 2024-08-09 | Stop reason: HOSPADM

## 2024-08-09 RX ORDER — AMOXICILLIN 250 MG
2 CAPSULE ORAL 2 TIMES DAILY
Status: DISPENSED | OUTPATIENT
Start: 2024-08-09

## 2024-08-09 RX ORDER — ESMOLOL HYDROCHLORIDE 10 MG/ML
INJECTION INTRAVENOUS AS NEEDED
Status: DISCONTINUED | OUTPATIENT
Start: 2024-08-09 | End: 2024-08-09

## 2024-08-09 RX ORDER — HYDROMORPHONE HYDROCHLORIDE 0.2 MG/ML
0.2 INJECTION INTRAMUSCULAR; INTRAVENOUS; SUBCUTANEOUS
Status: DISCONTINUED | OUTPATIENT
Start: 2024-08-09 | End: 2024-08-09

## 2024-08-09 RX ORDER — HYDROMORPHONE HYDROCHLORIDE 1 MG/ML
0.2 INJECTION, SOLUTION INTRAMUSCULAR; INTRAVENOUS; SUBCUTANEOUS
Status: DISCONTINUED | OUTPATIENT
Start: 2024-08-09 | End: 2024-08-09

## 2024-08-09 RX ORDER — LIDOCAINE 560 MG/1
1 PATCH PERCUTANEOUS; TOPICAL; TRANSDERMAL EVERY 24 HOURS
Status: DISPENSED | OUTPATIENT
Start: 2024-08-09 | End: 2024-08-12

## 2024-08-09 RX ORDER — CEFAZOLIN SODIUM 2 G/100ML
2 INJECTION, SOLUTION INTRAVENOUS EVERY 8 HOURS
Status: COMPLETED | OUTPATIENT
Start: 2024-08-09 | End: 2024-08-11

## 2024-08-09 RX ORDER — PHENYLEPHRINE HYDROCHLORIDE 10 MG/ML
INJECTION INTRAVENOUS AS NEEDED
Status: DISCONTINUED | OUTPATIENT
Start: 2024-08-09 | End: 2024-08-09

## 2024-08-09 RX ORDER — ROCURONIUM BROMIDE 10 MG/ML
INJECTION, SOLUTION INTRAVENOUS AS NEEDED
Status: DISCONTINUED | OUTPATIENT
Start: 2024-08-09 | End: 2024-08-09

## 2024-08-09 RX ORDER — PROTAMINE SULFATE 10 MG/ML
INJECTION, SOLUTION INTRAVENOUS AS NEEDED
Status: DISCONTINUED | OUTPATIENT
Start: 2024-08-09 | End: 2024-08-09

## 2024-08-09 RX ORDER — MIDAZOLAM HYDROCHLORIDE 1 MG/ML
INJECTION INTRAMUSCULAR; INTRAVENOUS AS NEEDED
Status: DISCONTINUED | OUTPATIENT
Start: 2024-08-09 | End: 2024-08-09

## 2024-08-09 RX ORDER — ALBUMIN HUMAN 50 G/1000ML
SOLUTION INTRAVENOUS AS NEEDED
Status: DISCONTINUED | OUTPATIENT
Start: 2024-08-09 | End: 2024-08-09

## 2024-08-09 RX ORDER — HYDROMORPHONE HYDROCHLORIDE 1 MG/ML
0.2 INJECTION, SOLUTION INTRAMUSCULAR; INTRAVENOUS; SUBCUTANEOUS EVERY 2 HOUR PRN
Status: DISCONTINUED | OUTPATIENT
Start: 2024-08-09 | End: 2024-08-09

## 2024-08-09 RX ORDER — MAGNESIUM SULFATE HEPTAHYDRATE 40 MG/ML
2 INJECTION, SOLUTION INTRAVENOUS EVERY 6 HOURS PRN
Status: DISCONTINUED | OUTPATIENT
Start: 2024-08-09 | End: 2024-08-10

## 2024-08-09 RX ORDER — POLYETHYLENE GLYCOL 3350 17 G/17G
17 POWDER, FOR SOLUTION ORAL 2 TIMES DAILY
Status: DISPENSED | OUTPATIENT
Start: 2024-08-09

## 2024-08-09 RX ORDER — NALOXONE HYDROCHLORIDE 0.4 MG/ML
0.2 INJECTION, SOLUTION INTRAMUSCULAR; INTRAVENOUS; SUBCUTANEOUS EVERY 5 MIN PRN
Status: ACTIVE | OUTPATIENT
Start: 2024-08-09

## 2024-08-09 RX ORDER — PROPOFOL 10 MG/ML
0-50 INJECTION, EMULSION INTRAVENOUS CONTINUOUS
Status: DISCONTINUED | OUTPATIENT
Start: 2024-08-09 | End: 2024-08-09

## 2024-08-09 RX ORDER — PANTOPRAZOLE SODIUM 40 MG/10ML
40 INJECTION, POWDER, LYOPHILIZED, FOR SOLUTION INTRAVENOUS
Status: DISCONTINUED | OUTPATIENT
Start: 2024-08-10 | End: 2024-08-09

## 2024-08-09 RX ORDER — HEPARIN SODIUM 1000 [USP'U]/ML
INJECTION, SOLUTION INTRAVENOUS; SUBCUTANEOUS AS NEEDED
Status: DISCONTINUED | OUTPATIENT
Start: 2024-08-09 | End: 2024-08-09 | Stop reason: HOSPADM

## 2024-08-09 RX ORDER — SODIUM CHLORIDE, SODIUM LACTATE, POTASSIUM CHLORIDE, CALCIUM CHLORIDE 600; 310; 30; 20 MG/100ML; MG/100ML; MG/100ML; MG/100ML
10 INJECTION, SOLUTION INTRAVENOUS CONTINUOUS
Status: DISCONTINUED | OUTPATIENT
Start: 2024-08-09 | End: 2024-08-10

## 2024-08-09 RX ORDER — PHENYLEPHRINE 10 MG/250 ML(40 MCG/ML)IN 0.9 % SOD.CHLORIDE INTRAVENOUS
CONTINUOUS PRN
Status: DISCONTINUED | OUTPATIENT
Start: 2024-08-09 | End: 2024-08-09

## 2024-08-09 RX ORDER — POTASSIUM CHLORIDE 14.9 MG/ML
20 INJECTION INTRAVENOUS EVERY 6 HOURS PRN
Status: DISCONTINUED | OUTPATIENT
Start: 2024-08-09 | End: 2024-08-10

## 2024-08-09 RX ADMIN — LIDOCAINE 1 PATCH: 4 PATCH TOPICAL at 14:19

## 2024-08-09 RX ADMIN — HYDROMORPHONE HYDROCHLORIDE 0.2 MG: 1 INJECTION, SOLUTION INTRAMUSCULAR; INTRAVENOUS; SUBCUTANEOUS at 14:27

## 2024-08-09 RX ADMIN — OXYCODONE HYDROCHLORIDE 10 MG: 5 TABLET ORAL at 15:17

## 2024-08-09 RX ADMIN — HYDROMORPHONE HYDROCHLORIDE 0.2 MG: 1 INJECTION, SOLUTION INTRAMUSCULAR; INTRAVENOUS; SUBCUTANEOUS at 18:54

## 2024-08-09 RX ADMIN — POLYETHYLENE GLYCOL 3350 17 G: 17 POWDER, FOR SOLUTION ORAL at 20:34

## 2024-08-09 RX ADMIN — PROPOFOL 20 MCG/KG/MIN: 10 INJECTION, EMULSION INTRAVENOUS at 13:49

## 2024-08-09 RX ADMIN — ASPIRIN 81 MG 81 MG: 81 TABLET ORAL at 15:13

## 2024-08-09 RX ADMIN — SENNOSIDES AND DOCUSATE SODIUM 2 TABLET: 50; 8.6 TABLET ORAL at 20:43

## 2024-08-09 RX ADMIN — OXYCODONE HYDROCHLORIDE 5 MG: 5 TABLET ORAL at 23:28

## 2024-08-09 RX ADMIN — ALBUMIN HUMAN 25 G: 0.05 INJECTION, SOLUTION INTRAVENOUS at 14:16

## 2024-08-09 RX ADMIN — HYDROMORPHONE HYDROCHLORIDE 0.2 MG: 1 INJECTION, SOLUTION INTRAMUSCULAR; INTRAVENOUS; SUBCUTANEOUS at 16:54

## 2024-08-09 RX ADMIN — CEFAZOLIN SODIUM 2 G: 2 INJECTION, SOLUTION INTRAVENOUS at 20:33

## 2024-08-09 RX ADMIN — Medication 4 L/MIN: at 16:01

## 2024-08-09 RX ADMIN — HYDROMORPHONE HYDROCHLORIDE 0.2 MG: 1 INJECTION, SOLUTION INTRAMUSCULAR; INTRAVENOUS; SUBCUTANEOUS at 15:03

## 2024-08-09 RX ADMIN — HYDROMORPHONE HYDROCHLORIDE 0.2 MG: 1 INJECTION, SOLUTION INTRAMUSCULAR; INTRAVENOUS; SUBCUTANEOUS at 22:42

## 2024-08-09 RX ADMIN — ACETAMINOPHEN 650 MG: 325 TABLET ORAL at 15:13

## 2024-08-09 RX ADMIN — CEFAZOLIN SODIUM 2 G: 2 INJECTION, SOLUTION INTRAVENOUS at 15:24

## 2024-08-09 RX ADMIN — ROSUVASTATIN CALCIUM 40 MG: 40 TABLET, FILM COATED ORAL at 20:34

## 2024-08-09 RX ADMIN — SODIUM CHLORIDE, POTASSIUM CHLORIDE, SODIUM LACTATE AND CALCIUM CHLORIDE 500 ML: 600; 310; 30; 20 INJECTION, SOLUTION INTRAVENOUS at 13:27

## 2024-08-09 RX ADMIN — HYDROMORPHONE HYDROCHLORIDE 0.2 MG: 1 INJECTION, SOLUTION INTRAMUSCULAR; INTRAVENOUS; SUBCUTANEOUS at 16:07

## 2024-08-09 RX ADMIN — SODIUM CHLORIDE, POTASSIUM CHLORIDE, SODIUM LACTATE AND CALCIUM CHLORIDE 500 ML: 600; 310; 30; 20 INJECTION, SOLUTION INTRAVENOUS at 16:56

## 2024-08-09 RX ADMIN — HYDROMORPHONE HYDROCHLORIDE 0.2 MG: 1 INJECTION, SOLUTION INTRAMUSCULAR; INTRAVENOUS; SUBCUTANEOUS at 20:31

## 2024-08-09 RX ADMIN — QUETIAPINE FUMARATE 25 MG: 25 TABLET ORAL at 22:42

## 2024-08-09 RX ADMIN — HYDROMORPHONE HYDROCHLORIDE 0.2 MG: 1 INJECTION, SOLUTION INTRAMUSCULAR; INTRAVENOUS; SUBCUTANEOUS at 23:51

## 2024-08-09 SDOH — HEALTH STABILITY: MENTAL HEALTH: CURRENT SMOKER: 0

## 2024-08-09 ASSESSMENT — PAIN SCALES - GENERAL
PAINLEVEL_OUTOF10: 9
PAINLEVEL_OUTOF10: 0 - NO PAIN
PAINLEVEL_OUTOF10: 8
PAINLEVEL_OUTOF10: 9
PAINLEVEL_OUTOF10: 9
PAIN_LEVEL: 0
PAINLEVEL_OUTOF10: 9

## 2024-08-09 ASSESSMENT — PAIN DESCRIPTION - LOCATION: LOCATION: CHEST

## 2024-08-09 ASSESSMENT — PAIN DESCRIPTION - DESCRIPTORS
DESCRIPTORS: ACHING

## 2024-08-09 ASSESSMENT — COLUMBIA-SUICIDE SEVERITY RATING SCALE - C-SSRS
1. IN THE PAST MONTH, HAVE YOU WISHED YOU WERE DEAD OR WISHED YOU COULD GO TO SLEEP AND NOT WAKE UP?: NO
2. HAVE YOU ACTUALLY HAD ANY THOUGHTS OF KILLING YOURSELF?: NO
6. HAVE YOU EVER DONE ANYTHING, STARTED TO DO ANYTHING, OR PREPARED TO DO ANYTHING TO END YOUR LIFE?: NO

## 2024-08-09 ASSESSMENT — PAIN - FUNCTIONAL ASSESSMENT
PAIN_FUNCTIONAL_ASSESSMENT: 0-10
PAIN_FUNCTIONAL_ASSESSMENT: CPOT (CRITICAL CARE PAIN OBSERVATION TOOL)
PAIN_FUNCTIONAL_ASSESSMENT: 0-10

## 2024-08-09 ASSESSMENT — PAIN DESCRIPTION - ORIENTATION: ORIENTATION: MID

## 2024-08-09 NOTE — ANESTHESIA PREPROCEDURE EVALUATION
Patient: Nevaeh Fair    Procedure Information       Anesthesia Start Date/Time: 08/09/24 0719    Procedure: Off Pump Coronary Artery Bypass Graft x 2; LIMA-LAD; Vein-OM2 (Chest)    Location: [facility] OR 19 / [facility] OR    Surgeons: Sukhdev Tellez MD            Relevant Problems   Cardiac   (+) Arteriosclerosis of coronary artery   (+) Coronary artery disease involving native coronary artery of native heart with unstable angina pectoris (Multi)   (+) HTN, goal below 140/90   (+) Hypercholesteremia   (+) Hyperlipemia   (+) Hyperlipemia, mixed      Neuro   (+) Acute lumbar radiculopathy   (+) Anxiety      GI   (+) Gastroesophageal reflux disease      /Renal   (+) Recurrent UTI (urinary tract infection)      Liver   (+) Elevated LFTs      Endocrine   (+) Diabetes mellitus type 2, controlled (Multi)      Hematology   (+) Vitamin B12 deficiency anemia      ID   (+) Recurrent UTI (urinary tract infection)       Clinical information reviewed:   Tobacco  Allergies  Meds   Med Hx  Surg Hx   Fam Hx  Soc Hx        NPO Detail:  NPO/Void Status  Carbohydrate Drink Given Prior to Surgery? : N  Date of Last Liquid: 08/08/24  Time of Last Liquid: 2300  Date of Last Solid: 08/08/24  Time of Last Solid: 2300  Last Intake Type: Clear fluids  Time of Last Void: 0558         Physical Exam    Airway  Mallampati: II  TM distance: >3 FB     Cardiovascular   Rhythm: regular  Rate: normal     Dental - normal exam     Pulmonary - normal exam  Breath sounds clear to auscultation     Abdominal          Anesthesia Plan    History of general anesthesia?: yes  History of complications of general anesthesia?: no    ASA 4     general     The patient is not a current smoker.    Anesthetic plan and risks discussed with patient.  Use of blood products discussed with patient who.    Plan discussed with CRNA.

## 2024-08-09 NOTE — ANESTHESIA PROCEDURE NOTES
Central Venous Line:    Date/Time: 8/9/2024 7:58 AM    A central venous line was placed in the OR for the following indication(s): central venous access and CVP monitoring.  Staffing  Performed: [facility]   Authorized by: Irwin Trejo MD    Performed by: Dariusz Khan RN    Sterility preparation included the following: provider hand hygiene performed prior to central venous catheter insertion, all 5 sterile barriers used (gloves, gown, cap, mask, large sterile drape) during central venous catheter insertion, antiseptic used during central venous catheter insertion and skin prep agent completely dried prior to procedure.  Medical reason for not performing maximal sterile barrier technique: no  The patient was placed in Trendelenburg position.    Right internal jugular vein was prepped.    The site was prepped with Chlorhexidine.  Size: 9 Fr (8 Fr)   Length: 11.5  Catheter type: introducer   Number of Lumens: double lumen (MAC with mini)    This catheter was not an oximetric catheter.    During the procedure, the following specific steps were taken: target vein identified, needle advanced into vein and blood aspirated and guidewire advanced into vein.  Seldinger technique used.  Procedure performed using ultrasound guidance and surface landmarks.  Sterile gel and probe cover used in ultrasound-guided central venous catheter insertion.    Intravenous verification was obtained by ultrasound, venous blood return, transducer and manometry.      Post insertion care included: all ports aspirated, all ports flushed easily, guidewire removed intact, Biopatch applied, line sutured in place and dressing applied.    During the procedure the patient experienced: patient tolerated procedure well with no complications.           images stored in chart

## 2024-08-09 NOTE — H&P
CTICU History & Physical    HPI:  Nevaeh Fair is a 78 y.o. with PMH of SAH, anxiety on chronic benzos, chronic pain with multiple opioid dispenses in the past year per OARRS, tremors, ex smoker, CAD, HTN, HLD, R ICA stenosis, GERD and DMII who was having worsening exertional angina and was found to have MVD on LHC.     Cardiac Testin2024 TTE:   CONCLUSIONS:   1. Left ventricular ejection fraction is normal, calculated by Andrews's biplane at 57%.   2. Left ventricular cavity size is decreased.   3. There is normal right ventricular global systolic function.   4. RVSP within normal limits.    24 LHC:  CONCLUSIONS:   1. Double vessel coronary artery disease with proximal left anterior descending involvement.   2. The 1st diagonal branch showed severe ostial atherosclerotic disease.   3. The 1st obtuse marginal branch showed severe proximal atherosclerotic disease.   4. Culprit vessel(s): left anterior descending, 1st diagonal, circumflex and 1st obtuse marginal.   5. Large left subclavian artery gives origin to large LIMA, a suitable graft conduit.      Intra-Op:  Procedure/Surgeon: off pump CABG x 3/Bigg Tellez  Out of OR Time (document on ventilator card): 1315     OR Course/Issues:   Median Sternotomy  Off Pump CABG X3 w/ LIMA to LAD, SVG to Om and Y-ed to Ramus  Posterior Pericardial Window  Closure w/ Wires & Plates     CPB time: N/A  Cross clamp time: N/A  Circ arrest time: N/A   Echo Pre/Post: normal BiV  Chest Tubes/Drains: bilateral pleural and 1 mediastinal   Temporary wires location/setting: V/ VVI 50      Fluids  Crystalloid: 500 ml  Colloid: 1111 ml  Cellsaver: 240 ml  Products: N/A  EBL: 250 ml  UOP: 500 ml     Anesthesia  Intubation: blade 3, grade 1  Intravenous Access: RIJ mac wi   AICD: N/A  PPM: N/A  Regional anesthesia: single shot blocks  Benzodiazepines: 2mg midazolam total  Opioids: 700mcg fentanyl total  NMB: 115mg rocuronium total  TOF/ reversal given: yes  Antibiotic time:  cefazolin 2g 0805 & 1149  Temperature on admission to ICU: 36     PMH:   Past Medical History:   Diagnosis Date    Abdominal cramping     Anxiety     Coronary artery disease     Familial hypercholesterolemia 04/17/2014    Essential familial hypercholesterolemia    Hypertension     Presence of intraocular lens 07/10/2014    Pseudophakia, left eye       PSH:   Past Surgical History:   Procedure Laterality Date    CARDIAC CATHETERIZATION N/A 07/10/2024    Procedure: Left Heart Cath with Coronary Angiography and LV;  Surgeon: Aditya Trejo MD;  Location: [facility] Cardiac Cath Lab;  Service: Cardiovascular;  Laterality: N/A;  7/10/24; 930 am for [facility] [address], CAD with angina I25.119 and hx stent Z98.61  [facility] medicare:  auth # [id number] 7/4/24-08/18/24    CATARACT EXTRACTION      COLONOSCOPY      ESOPHAGOGASTRODUODENOSCOPY         Social history:   Social History     Socioeconomic History    Marital status:      Spouse name: Not on file    Number of children: Not on file    Years of education: Not on file    Highest education level: Not on file   Occupational History    Not on file   Tobacco Use    Smoking status: Former     Types: Cigarettes    Smokeless tobacco: Never   Vaping Use    Vaping status: Never Used   Substance and Sexual Activity    Alcohol use: Never    Drug use: Never    Sexual activity: Defer   Other Topics Concern    Not on file   Social History Narrative    Not on file     Social Determinants of Health     Financial Resource Strain: Not on file   Food Insecurity: Not on file   Transportation Needs: Not on file   Physical Activity: Not on file   Stress: Not on file   Social Connections: Not on file   Intimate Partner Violence: Not on file   Housing Stability: Not on file      Family history:  Family History   Problem Relation Name Age of Onset    Heart disease Mother      Coronary artery disease Mother      Heart disease Father      Heart attack Father      Hypertension Sister         Allergies: No Known Allergies    Home meds:   Medications Prior to Admission   Medication Sig Dispense Refill Last Dose    ALPRAZolam (Xanax) 0.5 mg tablet Take 1 tablet (0.5 mg) by mouth 3 times a day as needed for anxiety for up to 7 days. 21 tablet 0 Past Week    aspirin 81 mg capsule Take 81 mg by mouth once daily.   Past Month    dicyclomine (Bentyl) 10 mg capsule Take 1 capsule (10 mg) by mouth 4 times a day. 120 capsule 2 Past Week    nitroglycerin (Nitrostat) 0.4 mg SL tablet    Past Month    omeprazole (PriLOSEC) 40 mg DR capsule TAKE 1 CAPSULE BY MOUTH TWICE A  capsule 1 8/9/2024    QUEtiapine (SeroqueL) 50 mg tablet Take 1 tablet (50 mg) by mouth once daily at bedtime. 90 tablet 1 8/8/2024    rosuvastatin (Crestor) 40 mg tablet TAKE 1 TABLET BY MOUTH EVERYDAY AT BEDTIME 90 tablet 0 Past Week    semaglutide (Ozempic) 0.25 mg or 0.5 mg (2 mg/3 mL) pen injector Inject 0.5 mg under the skin every 7 days.   Past Week       Review of Systems:  ROS unable to be completed as patient currently intubated and sedated    Active Medications:   Scheduled medications  acetaminophen, 650 mg, oral, q6h  aspirin, 81 mg, oral, Daily  ceFAZolin, 2 g, intravenous, q8h  insulin lispro, 0-15 Units, subcutaneous, q4h  lidocaine, 1 patch, transdermal, q24h  oxyCODONE, 10 mg, oral, Once  [START ON 8/10/2024] pantoprazole, 40 mg, oral, Daily before breakfast  polyethylene glycol, 17 g, oral, BID  rosuvastatin, 40 mg, oral, Nightly  sennosides-docusate sodium, 2 tablet, oral, BID      Continuous medications  lactated Ringer's, 10 mL/hr, Last Rate: 10 mL/hr (08/09/24 1330)  lactated Ringer's, 5 mL/hr, Last Rate: 5 mL/hr (08/09/24 1330)  norepinephrine, 0-1 mcg/kg/min (Adjusted), Last Rate: Stopped (08/09/24 1409)  propofol, 0-50 mcg/kg/min, Last Rate: Stopped (08/09/24 1542)      PRN medications  PRN medications: calcium gluconate, calcium gluconate, dextrose **OR** glucagon, HYDROmorphone, magnesium sulfate, magnesium  sulfate, naloxone, oxyCODONE, oxyCODONE, oxygen, potassium chloride CR **OR** potassium chloride, potassium chloride CR **OR** potassium chloride, potassium chloride, potassium chloride    Vitals:  Most Recent:  Vitals:    08/09/24 1600   BP:    Pulse: 79   Resp: 17   Temp:    SpO2: 100%       I/O:  No intake/output data recorded.    Physical Exam:   Constitutional: Intubated and sedated on mechanical ventilation in NAD   Neuro: Neuro intact without obvious focal deficits, on sedation.  PERRL  CV: RRR.  S1S2.  SR on monitor.  V wires set VVI 50  Pulm: CTAB on mechanical ventilation.  CT's with appropriate serosang output and no airleak.  : heller in place with clear yellow urine   GI: S/ND/NT. Hypoactive BS. OGT in place with bilious output.  Extremities: NV exam intact x 4.  No edema.  Skin: WDI.  Postop dressings intact.  Chest tube dressings intact.    Psych: DYLON, sedated      All relevant labs, imaging, and diagnostics reviewed.     Assessment:  Nevaeh Fair is a 78 y.o. with PMH of anxiety on chronic benzos, chronic pain on opioids, tremors, SAH, CAD, HTN, HLD, R ICA stenosis, GERD and DMII who was having worsening exertional angina and was found to have MVD on LHC.     Now presents to the CTICU from the OR s/p off pump CABG x 3, closed with sternal plates on 8/9/24 with Dr. Bigg Tellez.    Plan:  NEURO:  Hx of anxiety on chronic benzos, chronic pain on opioids, tremors, and SAH.  Acute post op pain.  Patient is currently intubated and sedated on propofol infusion.  No obvious focal deficits.  -->   - Serial neuro and pain assessments   - Continue propofol until NMB reversal, then SAT per unit protocol but daily at minimum   - NMB reversal when normothermic and hemodynamically stable.    - Scheduled Tylenol   - PRN oxycodone and Dilaudid for pain   - Lidoderm patches x3 days   - PT/OT Consult, OOB to chair as tolerated, chair position if not tolerated   - CAM ICU score qshift.  Sleep/wake cycle hygiene   -  hold home Xanax and Seroquel    CV:  Hx of CAD, HTN, HLD, R ICA stenosis 50-69%.  Now s/p off pump CABG x 3, closed with sternal plates on 8/9 with Ruda Tellez. Pre & post cardiac function:normal BiV.  V wires set to VVI 50,  SR on monitor.  Arrived to ICU on no vasoactives. -->  - Continuous EKG and ABP monitoring  - goal MAP > 65.  Allow for permissive HTN postop, Ruda tellez ok if MAPs 90-100s given known carotid stenosis.  - Maintain pacer VVI 50 backup   - Start ASA 81 mg daily today.    - Start high dose statin tomorrow    PULM:  Hx of ex smoking.  Currently intubated on ventilator with appropriate oxygenation and ventilation.  Chest tubes with no airleak and appropriate serosang output, to suction.  -->  - f/u post op CXR and daily CXR while in ICU  - Once NMB reversed begin CPAP trials and extubate when criteria met.    - Wean FiO2 maintaining SpO2 >92%.   - ABGs as needed  - Aggressive BPH, IS q1h and OOB to chair when extubated  - Maintain chest tubes to wall suction, notify team for high output per unit protocol    GI:  No hx of GERD. NPO with OGT in place, minimal bilious output.. -->  - Continue PPI until extubated  - NPO, swallow eval 4 hours post extubation.    - Senna-Colace and miralax BID for bowel regimen post op    :  No hx, baseline Cr 1.01.  Arrival to ICU NICOLA risk score: low. Heller in place with appropriate UOP.  -->  - Continue heller catheter for strict I/Os.    - Goal UOP 0.5ml/kg/hr  - RFP as clinically indicated.  Replete electrolytes per CTICU protocol.    ENDO: Hx of DM2, A1c 6.1.  Postoperative hyperglycemia well controlled with SSI. -->  - Maintain BG <180, SSI q4 per CTICU protocol    HEME:  No hx.  Acute blood loss anemia without active s/s of bleeding.   -->    - Monitor drain output volume and characteristics  - CBC, coags, and fibrinogen post op and as clinically indicated  - SCDs for DVT prophylaxis.  Hold SQH POD0 d/t increased risk of bleed.   - last type and screen: 8/9    ID:   MRSA negative.  Afebrile, no current indications of infection -->  - Trend temp q4h  - Periop Ancef x48hrs    I have discussed the case with the resident/advanced practice provider. I have personally performed a history, physical exam, and my own medical decision making. I have reviewed the note and agree with the findings and plan with the following exceptions as identified below. I have personally provided 36 minutes of critical care time exclusive of time spent on separately billable procedures. Time includes review of laboratory data, radiology results, discussion with consultants, family and monitoring for potential decompensation. Interventions were performed as documented above.      Family/Surrogate updated with plan of care.  Code status addressed/up to date.   Bay Gaytan MD  [facility] [id number]

## 2024-08-09 NOTE — CARE PLAN
Problem: Skin  Goal: Decreased wound size/increased tissue granulation at next dressing change  Outcome: Progressing  Flowsheets (Taken 8/9/2024 1627)  Decreased wound size/increased tissue granulation at next dressing change:   Utilize specialty bed per algorithm   Protective dressings over bony prominences  Goal: Participates in plan/prevention/treatment measures  Outcome: Progressing  Flowsheets (Taken 8/9/2024 1627)  Participates in plan/prevention/treatment measures: Elevate heels  Goal: Prevent/manage excess moisture  Outcome: Progressing  Flowsheets (Taken 8/9/2024 1627)  Prevent/manage excess moisture:   Moisturize dry skin   Cleanse incontinence/protect with barrier cream   Monitor for/manage infection if present   Follow provider orders for dressing changes  Goal: Prevent/minimize sheer/friction injuries  Outcome: Progressing  Flowsheets (Taken 8/9/2024 1627)  Prevent/minimize sheer/friction injuries:   Increase activity/out of bed for meals   Use pull sheet   Turn/reposition every 2 hours/use positioning/transfer devices   Utilize specialty bed per algorithm  Goal: Promote/optimize nutrition  Outcome: Progressing  Flowsheets (Taken 8/9/2024 1627)  Promote/optimize nutrition: Monitor/record intake including meals  Goal: Promote skin healing  Outcome: Progressing  Flowsheets (Taken 8/9/2024 1627)  Promote skin healing:   Protective dressings over bony prominences   Turn/reposition every 2 hours/use positioning/transfer devices   Assess skin/pad under line(s)/device(s)   Ensure correct size (line/device) and apply per  instructions   Rotate device position/do not position patient on device     Problem: Safety - Medical Restraint  Goal: Remains free of injury from restraints (Restraint for Interference with Medical Device)  Outcome: Met  Goal: Free from restraint(s) (Restraint for Interference with Medical Device)  Outcome: Met

## 2024-08-09 NOTE — ANESTHESIA PROCEDURE NOTES
Arterial Line:    Date/Time: 8/9/2024 7:39 AM    Staffing  Performed: [facility]   Authorized by: Irwin Trejo MD    Performed by: Dariusz Khan RN    An arterial line was placed. Procedure performed using ultrasound guidance.in the OR for the following indication(s): continuous blood pressure monitoring and blood sampling needed.    A 20 gauge (size), 12 cm (length), Angiocath (type) catheter was placed into the Left brachial artery, secured by tape,   Seldinger technique used.  Events:  patient tolerated procedure well with no complications.

## 2024-08-09 NOTE — ANESTHESIA POSTPROCEDURE EVALUATION
Patient: Nevaeh Fair    Procedure Summary       Date: 08/09/24 Room / Location: [facility] OR 19 / [facility]    Anesthesia Start: 0719 Anesthesia Stop: 1304    Procedure: Off Pump Coronary Artery Bypass Graft x 2; LIMA-LAD; Vein-OM2 (Chest) Diagnosis:       Coronary artery disease involving native coronary artery of native heart with unstable angina pectoris (Multi)      (Coronary artery disease involving native coronary artery of native heart with unstable angina pectoris (Multi) [I25.110])    Surgeons: Sukhdve Tellez MD Responsible Provider: Irwin Trejo MD    Anesthesia Type: general ASA Status: 4            Anesthesia Type: general    Vitals Value Taken Time   /70 08/09/24 1317   Temp 36.5 08/09/24 1317   Pulse 71 08/09/24 1316   Resp 16 08/09/24 1316   SpO2 100 % 08/09/24 1316   Vitals shown include unfiled device data.    Anesthesia Post Evaluation    Patient location during evaluation: ICU  Patient participation: complete - patient cannot participate  Level of consciousness: sedated  Pain score: 0  Pain management: adequate  Airway patency: patent  Cardiovascular status: acceptable and stable  Respiratory status: ETT and ventilator  Hydration status: acceptable  Postoperative Nausea and Vomiting: none        There were no known notable events for this encounter.

## 2024-08-09 NOTE — ANESTHESIA PROCEDURE NOTES
Peripheral IV  Date/Time: 8/9/2024 7:45 AM      Placement  Needle size: 16 G  Laterality: right  Location: hand  Site prep: alcohol  Technique: anatomical landmarks  Attempts: 1

## 2024-08-09 NOTE — H&P
CTICU History & Physical    HPI:  Nevaeh Fair is a 78 y.o. with PMH of SAH, anxiety on chronic benzos, chronic pain with multiple opioid dispenses in the past year per [facility], tremors, ex smoker, CAD, HTN, HLD, R ICA stenosis, GERD and DMII who was having worsening exertional angina and was found to have MVD on LHC.     Cardiac Testin2024 TTE:   CONCLUSIONS:   1. Left ventricular ejection fraction is normal, calculated by Andrews's biplane at 57%.   2. Left ventricular cavity size is decreased.   3. There is normal right ventricular global systolic function.   4. RVSP within normal limits.    24 LHC:  CONCLUSIONS:   1. Double vessel coronary artery disease with proximal left anterior descending involvement.   2. The 1st diagonal branch showed severe ostial atherosclerotic disease.   3. The 1st obtuse marginal branch showed severe proximal atherosclerotic disease.   4. Culprit vessel(s): left anterior descending, 1st diagonal, circumflex and 1st obtuse marginal.   5. Large left subclavian artery gives origin to large LIMA, a suitable graft conduit.      Intra-Op:  Procedure/Surgeon: off pump CABG x 3/Bigg Tellez  Out of OR Time (document on ventilator card): 1315     OR Course/Issues:   Median Sternotomy  Off Pump CABG X3 w/ LIMA to LAD, SVG to Om and Y-ed to Ramus  Posterior Pericardial Window  Closure w/ Wires & Plates     CPB time: N/A  Cross clamp time: N/A  Circ arrest time: N/A   Echo Pre/Post: normal BiV  Chest Tubes/Drains: bilateral pleural and 1 mediastinal   Temporary wires location/setting: V/ VVI 50      Fluids  Crystalloid: 500 ml  Colloid: 1111 ml  Cellsaver: 240 ml  Products: N/A  EBL: 250 ml  UOP: 500 ml     Anesthesia  Intubation: blade 3, grade 1  Intravenous Access: RIJ mac wi   AICD: N/A  PPM: N/A  Regional anesthesia: single shot blocks  Benzodiazepines: 2mg midazolam total  Opioids: 700mcg fentanyl total  NMB: 115mg rocuronium total  TOF/ reversal given: yes  Antibiotic time:  cefazolin 2g 0805 & 1149  Temperature on admission to ICU: 36     PMH:   Past Medical History:   Diagnosis Date    Abdominal cramping     Anxiety     Coronary artery disease     Familial hypercholesterolemia 04/17/2014    Essential familial hypercholesterolemia    Hypertension     Presence of intraocular lens 07/10/2014    Pseudophakia, left eye       PSH:   Past Surgical History:   Procedure Laterality Date    CARDIAC CATHETERIZATION N/A 07/10/2024    Procedure: Left Heart Cath with Coronary Angiography and LV;  Surgeon: Aditya Trejo MD;  Location: [facility] Cardiac Cath Lab;  Service: Cardiovascular;  Laterality: N/A;  7/10/24; 930 am for [facility] [id number], CAD with angina I25.119 and hx stent Z98.61  [facility] medicare:  auth # [id number]--lsrsn 7/4/24-08/18/24    CATARACT EXTRACTION      COLONOSCOPY      ESOPHAGOGASTRODUODENOSCOPY         Social history:   Social History     Socioeconomic History    Marital status:      Spouse name: Not on file    Number of children: Not on file    Years of education: Not on file    Highest education level: Not on file   Occupational History    Not on file   Tobacco Use    Smoking status: Former     Types: Cigarettes    Smokeless tobacco: Never   Vaping Use    Vaping status: Never Used   Substance and Sexual Activity    Alcohol use: Never    Drug use: Never    Sexual activity: Defer   Other Topics Concern    Not on file   Social History Narrative    Not on file     Social Determinants of Health     Financial Resource Strain: Not on file   Food Insecurity: Not on file   Transportation Needs: Not on file   Physical Activity: Not on file   Stress: Not on file   Social Connections: Not on file   Intimate Partner Violence: Not on file   Housing Stability: Not on file      Family history:  Family History   Problem Relation Name Age of Onset    Heart disease Mother      Coronary artery disease Mother      Heart disease Father      Heart attack Father      Hypertension Sister         Allergies: No Known Allergies    Home meds:   Medications Prior to Admission   Medication Sig Dispense Refill Last Dose    ALPRAZolam (Xanax) 0.5 mg tablet Take 1 tablet (0.5 mg) by mouth 3 times a day as needed for anxiety for up to 7 days. 21 tablet 0 Past Week    aspirin 81 mg capsule Take 81 mg by mouth once daily.   Past Month    dicyclomine (Bentyl) 10 mg capsule Take 1 capsule (10 mg) by mouth 4 times a day. 120 capsule 2 Past Week    nitroglycerin (Nitrostat) 0.4 mg SL tablet    Past Month    omeprazole (PriLOSEC) 40 mg DR capsule TAKE 1 CAPSULE BY MOUTH TWICE A  capsule 1 8/9/2024    QUEtiapine (SeroqueL) 50 mg tablet Take 1 tablet (50 mg) by mouth once daily at bedtime. 90 tablet 1 8/8/2024    rosuvastatin (Crestor) 40 mg tablet TAKE 1 TABLET BY MOUTH EVERYDAY AT BEDTIME 90 tablet 0 Past Week    semaglutide (Ozempic) 0.25 mg or 0.5 mg (2 mg/3 mL) pen injector Inject 0.5 mg under the skin every 7 days.   Past Week       Review of Systems:  ROS unable to be completed as patient currently intubated and sedated    Active Medications:   Scheduled medications  acetaminophen, 650 mg, oral, q6h  aspirin, 81 mg, oral, Daily  ceFAZolin, 2 g, intravenous, q8h  insulin lispro, 0-15 Units, subcutaneous, q4h  lidocaine, 1 patch, transdermal, q24h  [START ON 8/10/2024] pantoprazole, 40 mg, oral, Daily before breakfast  polyethylene glycol, 17 g, oral, BID  rosuvastatin, 40 mg, oral, Nightly  sennosides-docusate sodium, 2 tablet, oral, BID      Continuous medications  lactated Ringer's, 10 mL/hr, Last Rate: 10 mL/hr (08/09/24 1330)  lactated Ringer's, 5 mL/hr, Last Rate: 5 mL/hr (08/09/24 1330)  norepinephrine, 0-1 mcg/kg/min (Adjusted), Last Rate: Stopped (08/09/24 1409)  propofol, 0-50 mcg/kg/min, Last Rate: 10 mcg/kg/min (08/09/24 1442)      PRN medications  PRN medications: calcium gluconate, calcium gluconate, dextrose **OR** glucagon, HYDROmorphone, magnesium sulfate, magnesium sulfate, naloxone, oxyCODONE,  oxyCODONE, oxygen, potassium chloride CR **OR** potassium chloride, potassium chloride CR **OR** potassium chloride, potassium chloride, potassium chloride    Vitals:  Most Recent:  Vitals:    08/09/24 1454   BP:    Pulse:    Resp: 20   Temp:    SpO2:        I/O:  No intake/output data recorded.    Physical Exam:   Constitutional: Intubated and sedated on mechanical ventilation in NAD   Neuro: Neuro intact without obvious focal deficits, on sedation.  PERRL  CV: RRR.  S1S2.  SR on monitor.  V wires set VVI 50  Pulm: CTAB on mechanical ventilation.  CT's with appropriate serosang output and no airleak.  : heller in place with clear yellow urine   GI: S/ND/NT. Hypoactive BS. OGT in place with bilious output.  Extremities: NV exam intact x 4.  No edema.  Skin: WDI.  Postop dressings intact.  Chest tube dressings intact.    Psych: DYLON, sedated      All relevant labs, imaging, and diagnostics reviewed.     Assessment:  Nevaeh Fair is a 78 y.o. with PMH of anxiety on chronic benzos, chronic pain on opioids, tremors, SAH, CAD, HTN, HLD, R ICA stenosis, GERD and DMII who was having worsening exertional angina and was found to have MVD on LHC.     Now presents to the CTICU from the OR s/p off pump CABG x 3, closed with sternal plates on 8/9/24 with Dr. Bigg Tellez.    Plan:  NEURO:  Hx of anxiety on chronic benzos, chronic pain on opioids, tremors, and SAH.  Acute post op pain.  Patient is currently intubated and sedated on propofol infusion.  No obvious focal deficits.  -->   - Serial neuro and pain assessments   - Continue propofol until NMB reversal, then SAT per unit protocol but daily at minimum   - NMB reversal when normothermic and hemodynamically stable.    - Scheduled Tylenol   - PRN oxycodone and Dilaudid for pain   - Lidoderm patches x3 days   - PT/OT Consult, OOB to chair as tolerated, chair position if not tolerated   - CAM ICU score qshift.  Sleep/wake cycle hygiene   - hold home Xanax and Seroquel    CV:  Hx  of CAD, HTN, HLD, R ICA stenosis 50-69%.  Now s/p off pump CABG x 3, closed with sternal plates on 8/9 with Ruda Tellez. Pre & post cardiac function:normal BiV.  V wires set to VVI 50,  SR on monitor.  Arrived to ICU on no vasoactives. -->  - Continuous EKG and ABP monitoring  - goal MAP > 65.  Allow for permissive HTN postop, Ruda tellez ok if MAPs 90-100s given known carotid stenosis.  - Maintain pacer VVI 50 backup   - Start ASA 81 mg daily today.    - Start high dose statin tomorrow    PULM:  Hx of ex smoking.  Currently intubated on ventilator with appropriate oxygenation and ventilation.  Chest tubes with no airleak and appropriate serosang output, to suction.  -->  - f/u post op CXR and daily CXR while in ICU  - Once NMB reversed begin CPAP trials and extubate when criteria met.    - Wean FiO2 maintaining SpO2 >92%.   - ABGs as needed  - Aggressive BPH, IS q1h and OOB to chair when extubated  - Maintain chest tubes to wall suction, notify team for high output per unit protocol    GI:  No hx of GERD. NPO with OGT in place, minimal bilious output.. -->  - Continue PPI until extubated  - NPO, swallow eval 4 hours post extubation.    - Senna-Colace and miralax BID for bowel regimen post op    :  No hx, baseline Cr 1.01.  Arrival to ICU NICOLA risk score: low. Heller in place with appropriate UOP.  -->  - Continue heller catheter for strict I/Os.    - Goal UOP 0.5ml/kg/hr  - RFP as clinically indicated.  Replete electrolytes per CTICU protocol.    ENDO: Hx of DM2, A1c 6.1.  Postoperative hyperglycemia well controlled with SSI. -->  - Maintain BG <180, SSI q4 per CTICU protocol    HEME:  No hx.  Acute blood loss anemia without active s/s of bleeding.   -->    - Monitor drain output volume and characteristics  - CBC, coags, and fibrinogen post op and as clinically indicated  - SCDs for DVT prophylaxis.  Hold SQH POD0 d/t increased risk of bleed.   - last type and screen: 8/9    ID:  MRSA negative.  Afebrile, no current  indications of infection -->  - Trend temp q4h  - Periop Ancef x48hrs    Skin:   Postoperative dressings CDI without s/s of infection.   - arrived to ICU from OR with preventative Mepilex dressings in place on sacrum and heels, to be changed per unit protocol and PRN  - every shift skin assessment per nursing and weekly ICU skin rounds  - moisture barrier to be applied with marquez care  - postoperative dressings removed/changed per protocol  - active skin problems addressed with nursing on daily rounds and wound care interventions in place as warranted    Proph:  SCDs  PPI    G:  Line  Right IJ MAC w mini MAC placed 8/9  Left brachial isabel placed 8/9  Pryor placed 8/9  PIVs    Daily Risk Screen:  Intubated? Yes, planned for extubation later today  Central line? Yes, keep for hemodynamic monitoring and vasoactives  Pryor? Yes, keep for critical need for accurate I&O's    F: Family: will update family at bedside as able    A,B,C,D,E,F,G: reviewed    Restraints: The indications and risks/benefits of non-violent/non self-destructive restraints were discussed and bilateral soft wrist restraints warranted.    Dispo: CTICU     I have personally spent 62 minutes of critical care time, exclusive of time spent on any procedures, in evaluation and management of this critically ill patient’s condition as above.    Sarah Irving, GUDELIA-CNP  CTICU [id number]

## 2024-08-09 NOTE — OP NOTE
Off Pump Coronary Artery Bypass Graft x 2; LIMA-LAD; Vein-OM2 Operative Note     Date: 2024  OR Location: [facility] OR    Name: Nevaeh Fair, : 1945, Age: 78 y.o., MRN: [id number], Sex: female    Diagnosis  Pre-op Diagnosis      * Coronary artery disease involving native coronary artery of native heart with unstable angina pectoris (Multi) [I25.110] Post-op Diagnosis     * Coronary artery disease involving native coronary artery of native heart with unstable angina pectoris (Multi) [I25.110]     Procedures  Off Pump Coronary Artery Bypass Graft x 2; LIMA-LAD; Vein-OM2  18906 - [place] CORONARY ARTERY BYP W/VEIN & ARTERY GRAFT 2 VEIN  1.  Off-pump CABG x 3 with skeletonized LIMA to LAD, right saphenous vein as a Y graft to ramus and OM.  2.  Endoscopic vein harvest.  3.  Sternal plating.    Surgeons      * Sukhdev Tellez - Primary    Resident/Fellow/Other Assistant:  Surgeons and Role:  * No surgeons found with a matching role *  There was no qualified resident for this opeation  E Alexy Manzo    Median Sternotomy  Off Pump CABG X3 w/ LIMA to LAD, SVG to Om and Y-ed to Ramus  Posterior Pericardial Window  Closure w/ Wires & Plates    Chest Tubes/Drains:      R Pleural, L Pleural, Mediastinal to Atriums X2  Temporary Pacing Wires:    V Wires    Permanent pacer/ICD: No   -Preoperative settings:    -Intra-op/ Postoperative settings:    Sternotomy performed by: Simón    Conduit Harvested by: Jovany    Sternal Wires placed by: Carl    Arm/Leg/Groin Closure/Cutdown performed by: Jovany    Cardio Pulmonary Bypass Time: n/a  Cross-clamp Time: n/a  Circulatory Arrest: No Time:     Is patient candidate for Emergency Re-sternotomy? Yes   -If yes, POD #10 is -     Procedure Summary  Anesthesia: General  ASA: IV  Anesthesia Staff: Anesthesiologist: Irwin Trejo MD  CRNA: GUDELIA Cheung-CRNA  SRNA: Dariusz Khan RN  Perfusionist: Rafat Kimbrough  Estimated Blood Loss: 200  mL  Intra-op Medications:   Administrations occurring from 0715 to 1245 on 08/09/24:   Medication Name Total Dose   sodium chloride 0.9 % irrigation solution 3,000 mL   papaverine injection 60 mg   heparin 1,000 unit/mL injection 1,000 Units   vancomycin (Vancocin) vial for injection 1 g              Anesthesia Record               Intraprocedure I/O Totals          Intake    electrolyte-A (Plasmalyte-A) 1900.00 mL    Norepinephrine Drip 0.00 mL    The total shown is the total volume documented since Anesthesia Start was filed.    Tranexamic Acid 0.00 mL    The total shown is the total volume documented since Anesthesia Start was filed.    Insulin Drip 0.00 mL    The total shown is the total volume documented since Anesthesia Start was filed.    Nitroglycerin Drip 0.00 mL    The total shown is the total volume documented since Anesthesia Start was filed.    Phenylephrine Drip 0.00 mL    The total shown is the total volume documented since Anesthesia Start was filed.    Autologous Blood 240 mL    Total Intake 2140 mL       Output    Urine 325 mL    Total Output 325 mL       Net    Net Volume 1815 mL          Specimen: No specimens collected     Staff:   Circulator: Stephanie Holden Person: Misty Denny Circulator: Cheli         Drains and/or Catheters:   Urethral Catheter Double-lumen;Non-latex;Temperature probe 14 Fr. (Active)       Tourniquet Times:         Implants:  Implants       Type Name Action Serial No.       OCTOPUS EVOLUTION TISSUE STABILIZER  Implanted               Findings: 1.There were no pericardial adhesions or effusions.   2. The ascending aorta was soft without evidence of atherosclerosis.   3. The heart was normal sinus rhythm and demonstrated normal left ventricular function.    4.The patient had severe diffuse coronary artery disease, however we were able to find locations on the LAD, ramus and OM that was suitable for grafting.  5.  The conduits were suitable for grafting.  6.  The flow of  the graft after the protamine were acceptable.      Indications: Nevaeh Fair is an 78 y.o. female who is having surgery for Coronary artery disease involving native coronary artery of native heart with unstable angina pectoris (Multi) [I25.110].  Nevaeh has been sent to to me by Dr. Narciso Paige after the he saw her in his outpatient clinic.  The patient was sent after being diagnosed with symptomatic double vessel coronary disease with in-stent restenosis of the LAD.  Because of concomitant carotid disease within that this patient will be best served by an off-pump revascularization.  We discussed with the patient the risk of the procedure especially the risk of stroke, and also the benefit in terms of symptom relief and long-term prognosis and she agreed to proceed.    The patient was seen in the preoperative area. The risks, benefits, complications, treatment options, non-operative alternatives, expected recovery and outcomes were discussed with the patient. The possibilities of reaction to medication, pulmonary aspiration, injury to surrounding structures, bleeding, recurrent infection, the need for additional procedures, failure to diagnose a condition, and creating a complication requiring transfusion or operation were discussed with the patient. The patient concurred with the proposed plan, giving informed consent.  The site of surgery was properly noted/marked if necessary per policy. The patient has been actively warmed in preoperative area. Preoperative antibiotics have been ordered and given within 1 hours of incision. Venous thrombosis prophylaxis have been ordered including chemical prophylaxis    Procedure Details: After informed consent, and positive identification, the patient was brought to the operative theatre. They were placed on the operating room table in the supine position and an arterial monitoring line was placed. They subsequently  underwent induction of anesthesia, and brooke-tracheal  intubation.  A ALEJANDRO probe was placed in the esophagus, and central intravenous access obtained. They were then prepped and draped in a sterile surgical fashion.      A surgical time-out was performed with the correct patient, correct procedure, laterality, timing and dosage of antibiotics, availability of blood, administration of beta blocker, fire risk, and sterility of  instruments were all verified, before beginning the case.     A median sternotomy was performed.  The left  internal thoracic arteries was dissected from the chest wall in a skeletonized manner .  In the meantime Saphenous and was harvest endoscopically.  The thymus was divided and the pericardium was opened.  The ascending aorta was palpated and it was without evidence of atherosclerosis.  The patient was heparinized.  We performed a coronary artery bypass grafting without the assistance of cardiopulmonary bypass.  We used the coronary artery stabilizing device and the heart positioner to expose and stabilize each coronaries and  intravascular shunts to obtain hemostasis and prevent ischemia during construction of the anastomoses.   The left internal thoracic artery was anastomosed in situ graft to the LAD an end-to-side anastomosis was performed using 7-0 Prolene in a running fashion.  Immediately after the flow of the graft was measured.  Short reverse length of saphenous vein was anastomosed to the ramus as an end-to-side anastomosis using 7-0 Prolene in a running fashion.  The proximal of the vein was anastomosed to the bypass to the OM, making a baby Y graft configuration.  The proximal was the last anastomosis performed at the end of the surgery.  Reverse length of saphenous vein was anastomosed to the OM as an end-to-side anastomosis using 7-0 Prolene in a running fashion.  The proximal of the vein was anastomosed to the ascending aorta using, 6-0 Prolene and a 4 mm punch partial cross-clamp technique.  The  heparin effect was reversed with  protamine.  Hemostasis was obtained, mediastinal and bilateral pleural chest tubes were placed, right ventricular pacemaker wires were placed on the diaphragmatic surface of the RV, and the wounds were closed in the standard fashion.  The patient tolerated the procedure well and was brought to the intensive care unit in stable condition.  To sponge counts, instrument counts, and needle counts were reported correct by the circulating nurse.  There were no specimens sent to pathology.  The drains included mediastinal and pleural chest tube.  Bypass times:   Medistem flows:   LIIMA to LAD:  35 ml/min with a PI of 2  SVG to ramus and OM: 98 ml/min with a PI of 1.4        Complications:  None; patient tolerated the procedure well.    Disposition: ICU - intubated and hemodynamically stable.  Condition: stable         Additional Details:     Attending Attestation: I was present and scrubbed for the entire procedure.    Sukhdev Tellez  Phone Number: [phone]

## 2024-08-09 NOTE — ANESTHESIA PROCEDURE NOTES
Airway  Date/Time: 8/9/2024 7:43 AM  Urgency: elective    Airway not difficult    Staffing  Performed: [facility]   Authorized by: Irwin Trejo MD    Performed by: Dariusz Khan RN  Patient location during procedure: OR    Indications and Patient Condition  Indications for airway management: anesthesia  Spontaneous Ventilation: absent  Sedation level: deep  Preoxygenated: yes  Patient position: sniffing  Mask difficulty assessment: 1 - vent by mask  No planned trial extubation    Final Airway Details  Final airway type: endotracheal airway      Successful airway: ETT  Cuffed: yes   Successful intubation technique: direct laryngoscopy  Facilitating devices/methods: intubating stylet  Blade: Merline  Blade size: #3  ETT size (mm): 7.0  Cormack-Lehane Classification: grade I - full view of glottis  Placement verified by: chest auscultation and capnometry   Measured from: lips  ETT to lips (cm): 22  Number of attempts at approach: 1    Additional Comments  Lips and teeth in preanesthetic condition.

## 2024-08-10 ENCOUNTER — APPOINTMENT (OUTPATIENT)
Dept: RADIOLOGY | Facility: HOSPITAL | Age: 79
DRG: 236 | End: 2024-08-10
Payer: MEDICARE

## 2024-08-10 PROBLEM — R05.9 COUGH: Status: RESOLVED | Noted: 2023-08-21 | Resolved: 2024-08-10

## 2024-08-10 LAB
ALBUMIN SERPL BCP-MCNC: 4 G/DL (ref 3.4–5)
ANION GAP BLDA CALCULATED.4IONS-SCNC: 10 MMO/L (ref 10–25)
ANION GAP BLDA CALCULATED.4IONS-SCNC: 13 MMO/L (ref 10–25)
ANION GAP SERPL CALC-SCNC: 15 MMOL/L (ref 10–20)
BASE EXCESS BLDA CALC-SCNC: -3.3 MMOL/L (ref -2–3)
BASE EXCESS BLDA CALC-SCNC: 2.3 MMOL/L (ref -2–3)
BLOOD EXPIRATION DATE: NORMAL
BODY TEMPERATURE: 37 DEGREES CELSIUS
BODY TEMPERATURE: 37 DEGREES CELSIUS
BUN SERPL-MCNC: 17 MG/DL (ref 6–23)
CA-I BLD-SCNC: 1.18 MMOL/L (ref 1.1–1.33)
CA-I BLDA-SCNC: 1.09 MMOL/L (ref 1.1–1.33)
CA-I BLDA-SCNC: 1.15 MMOL/L (ref 1.1–1.33)
CALCIUM SERPL-MCNC: 8.7 MG/DL (ref 8.6–10.6)
CHLORIDE BLDA-SCNC: 110 MMOL/L (ref 98–107)
CHLORIDE BLDA-SCNC: 111 MMOL/L (ref 98–107)
CHLORIDE SERPL-SCNC: 104 MMOL/L (ref 98–107)
CO2 SERPL-SCNC: 25 MMOL/L (ref 21–32)
CREAT SERPL-MCNC: 0.63 MG/DL (ref 0.5–1.05)
DISPENSE STATUS: NORMAL
EGFRCR SERPLBLD CKD-EPI 2021: 90 ML/MIN/1.73M*2
ERYTHROCYTE [DISTWIDTH] IN BLOOD BY AUTOMATED COUNT: 13.9 % (ref 11.5–14.5)
GLUCOSE BLD MANUAL STRIP-MCNC: 117 MG/DL (ref 74–99)
GLUCOSE BLD MANUAL STRIP-MCNC: 125 MG/DL (ref 74–99)
GLUCOSE BLD MANUAL STRIP-MCNC: 138 MG/DL (ref 74–99)
GLUCOSE BLD MANUAL STRIP-MCNC: 161 MG/DL (ref 74–99)
GLUCOSE BLD MANUAL STRIP-MCNC: 169 MG/DL (ref 74–99)
GLUCOSE BLDA-MCNC: 128 MG/DL (ref 74–99)
GLUCOSE BLDA-MCNC: 159 MG/DL (ref 74–99)
GLUCOSE SERPL-MCNC: 144 MG/DL (ref 74–99)
HCO3 BLDA-SCNC: 20.8 MMOL/L (ref 22–26)
HCO3 BLDA-SCNC: 26.4 MMOL/L (ref 22–26)
HCT VFR BLD AUTO: 26.9 % (ref 36–46)
HCT VFR BLD EST: 21 % (ref 36–46)
HCT VFR BLD EST: 26 % (ref 36–46)
HGB BLD-MCNC: 9.4 G/DL (ref 12–16)
HGB BLDA-MCNC: 7.1 G/DL (ref 12–16)
HGB BLDA-MCNC: 8.5 G/DL (ref 12–16)
INHALED O2 CONCENTRATION: 21 %
INHALED O2 CONCENTRATION: 35 %
LACTATE BLDA-SCNC: 1.2 MMOL/L (ref 0.4–2)
LACTATE BLDA-SCNC: 1.5 MMOL/L (ref 0.4–2)
MAGNESIUM SERPL-MCNC: 2.11 MG/DL (ref 1.6–2.4)
MCH RBC QN AUTO: 32.6 PG (ref 26–34)
MCHC RBC AUTO-ENTMCNC: 34.9 G/DL (ref 32–36)
MCV RBC AUTO: 93 FL (ref 80–100)
NRBC BLD-RTO: 0 /100 WBCS (ref 0–0)
OXYHGB MFR BLDA: 97 % (ref 94–98)
OXYHGB MFR BLDA: 97.4 % (ref 94–98)
PCO2 BLDA: 32 MM HG (ref 38–42)
PCO2 BLDA: 38 MM HG (ref 38–42)
PH BLDA: 7.42 PH (ref 7.38–7.42)
PH BLDA: 7.45 PH (ref 7.38–7.42)
PHOSPHATE SERPL-MCNC: 4.2 MG/DL (ref 2.5–4.9)
PLATELET # BLD AUTO: 147 X10*3/UL (ref 150–450)
PO2 BLDA: 110 MM HG (ref 85–95)
PO2 BLDA: 132 MM HG (ref 85–95)
POTASSIUM BLDA-SCNC: 3.5 MMOL/L (ref 3.5–5.3)
POTASSIUM BLDA-SCNC: 3.9 MMOL/L (ref 3.5–5.3)
POTASSIUM SERPL-SCNC: 4.1 MMOL/L (ref 3.5–5.3)
PRODUCT BLOOD TYPE: 6200
PRODUCT CODE: NORMAL
RBC # BLD AUTO: 2.88 X10*6/UL (ref 4–5.2)
SAO2 % BLDA: 100 % (ref 94–100)
SAO2 % BLDA: 99 % (ref 94–100)
SODIUM BLDA-SCNC: 141 MMOL/L (ref 136–145)
SODIUM BLDA-SCNC: 142 MMOL/L (ref 136–145)
SODIUM SERPL-SCNC: 140 MMOL/L (ref 136–145)
UNIT ABO: NORMAL
UNIT NUMBER: NORMAL
UNIT RH: NORMAL
UNIT VOLUME: 350
WBC # BLD AUTO: 9.8 X10*3/UL (ref 4.4–11.3)
XM INTEP: NORMAL

## 2024-08-10 PROCEDURE — 2500000001 HC RX 250 WO HCPCS SELF ADMINISTERED DRUGS (ALT 637 FOR MEDICARE OP): Performed by: NURSE PRACTITIONER

## 2024-08-10 PROCEDURE — 1200000002 HC GENERAL ROOM WITH TELEMETRY DAILY

## 2024-08-10 PROCEDURE — 99291 CRITICAL CARE FIRST HOUR: CPT | Performed by: ANESTHESIOLOGY

## 2024-08-10 PROCEDURE — 71045 X-RAY EXAM CHEST 1 VIEW: CPT

## 2024-08-10 PROCEDURE — 80069 RENAL FUNCTION PANEL: CPT | Performed by: NURSE PRACTITIONER

## 2024-08-10 PROCEDURE — 2500000004 HC RX 250 GENERAL PHARMACY W/ HCPCS (ALT 636 FOR OP/ED): Performed by: NURSE PRACTITIONER

## 2024-08-10 PROCEDURE — P9045 ALBUMIN (HUMAN), 5%, 250 ML: HCPCS | Mod: JZ | Performed by: NURSE PRACTITIONER

## 2024-08-10 PROCEDURE — 84132 ASSAY OF SERUM POTASSIUM: CPT | Performed by: NURSE PRACTITIONER

## 2024-08-10 PROCEDURE — 2500000002 HC RX 250 W HCPCS SELF ADMINISTERED DRUGS (ALT 637 FOR MEDICARE OP, ALT 636 FOR OP/ED): Performed by: NURSE PRACTITIONER

## 2024-08-10 PROCEDURE — 97161 PT EVAL LOW COMPLEX 20 MIN: CPT | Mod: GP

## 2024-08-10 PROCEDURE — 85027 COMPLETE CBC AUTOMATED: CPT | Performed by: NURSE PRACTITIONER

## 2024-08-10 PROCEDURE — 71045 X-RAY EXAM CHEST 1 VIEW: CPT | Performed by: RADIOLOGY

## 2024-08-10 PROCEDURE — 2500000004 HC RX 250 GENERAL PHARMACY W/ HCPCS (ALT 636 FOR OP/ED): Mod: JZ | Performed by: NURSE PRACTITIONER

## 2024-08-10 PROCEDURE — 2500000004 HC RX 250 GENERAL PHARMACY W/ HCPCS (ALT 636 FOR OP/ED): Performed by: STUDENT IN AN ORGANIZED HEALTH CARE EDUCATION/TRAINING PROGRAM

## 2024-08-10 PROCEDURE — 83735 ASSAY OF MAGNESIUM: CPT | Performed by: NURSE PRACTITIONER

## 2024-08-10 PROCEDURE — P9045 ALBUMIN (HUMAN), 5%, 250 ML: HCPCS | Mod: JZ | Performed by: STUDENT IN AN ORGANIZED HEALTH CARE EDUCATION/TRAINING PROGRAM

## 2024-08-10 PROCEDURE — 97116 GAIT TRAINING THERAPY: CPT | Mod: GP

## 2024-08-10 PROCEDURE — 82947 ASSAY GLUCOSE BLOOD QUANT: CPT

## 2024-08-10 PROCEDURE — 82330 ASSAY OF CALCIUM: CPT | Performed by: NURSE PRACTITIONER

## 2024-08-10 PROCEDURE — 37799 UNLISTED PX VASCULAR SURGERY: CPT | Performed by: NURSE PRACTITIONER

## 2024-08-10 PROCEDURE — 2500000004 HC RX 250 GENERAL PHARMACY W/ HCPCS (ALT 636 FOR OP/ED): Mod: JZ | Performed by: STUDENT IN AN ORGANIZED HEALTH CARE EDUCATION/TRAINING PROGRAM

## 2024-08-10 PROCEDURE — 2500000005 HC RX 250 GENERAL PHARMACY W/O HCPCS: Performed by: NURSE PRACTITIONER

## 2024-08-10 PROCEDURE — 2500000004 HC RX 250 GENERAL PHARMACY W/ HCPCS (ALT 636 FOR OP/ED)

## 2024-08-10 RX ORDER — HYDROMORPHONE HYDROCHLORIDE 0.2 MG/ML
0.2 INJECTION INTRAMUSCULAR; INTRAVENOUS; SUBCUTANEOUS EVERY 2 HOUR PRN
Status: DISCONTINUED | OUTPATIENT
Start: 2024-08-10 | End: 2024-08-11

## 2024-08-10 RX ORDER — ONDANSETRON HYDROCHLORIDE 2 MG/ML
4 INJECTION, SOLUTION INTRAVENOUS EVERY 8 HOURS PRN
Status: DISPENSED | OUTPATIENT
Start: 2024-08-10

## 2024-08-10 RX ORDER — INSULIN LISPRO 100 [IU]/ML
0-15 INJECTION, SOLUTION INTRAVENOUS; SUBCUTANEOUS
Status: DISCONTINUED | OUTPATIENT
Start: 2024-08-10 | End: 2024-08-10

## 2024-08-10 RX ORDER — NOREPINEPHRINE BITARTRATE/D5W 8 MG/250ML
0-.5 PLASTIC BAG, INJECTION (ML) INTRAVENOUS CONTINUOUS
Status: DISCONTINUED | OUTPATIENT
Start: 2024-08-10 | End: 2024-08-10

## 2024-08-10 RX ORDER — HEPARIN SODIUM 5000 [USP'U]/ML
5000 INJECTION, SOLUTION INTRAVENOUS; SUBCUTANEOUS EVERY 8 HOURS
Status: DISPENSED | OUTPATIENT
Start: 2024-08-10

## 2024-08-10 RX ORDER — ATORVASTATIN CALCIUM 80 MG/1
80 TABLET, FILM COATED ORAL NIGHTLY
Status: DISPENSED | OUTPATIENT
Start: 2024-08-10

## 2024-08-10 RX ORDER — ONDANSETRON HYDROCHLORIDE 2 MG/ML
4 INJECTION, SOLUTION INTRAVENOUS ONCE
Status: COMPLETED | OUTPATIENT
Start: 2024-08-10 | End: 2024-08-10

## 2024-08-10 RX ORDER — ONDANSETRON HYDROCHLORIDE 2 MG/ML
INJECTION, SOLUTION INTRAVENOUS
Status: COMPLETED
Start: 2024-08-10 | End: 2024-08-10

## 2024-08-10 RX ORDER — TRAMADOL HYDROCHLORIDE 50 MG/1
25 TABLET ORAL ONCE
Status: COMPLETED | OUTPATIENT
Start: 2024-08-11 | End: 2024-08-11

## 2024-08-10 RX ORDER — ALBUMIN HUMAN 50 G/1000ML
25 SOLUTION INTRAVENOUS ONCE
Status: COMPLETED | OUTPATIENT
Start: 2024-08-10 | End: 2024-08-10

## 2024-08-10 RX ORDER — INSULIN LISPRO 100 [IU]/ML
0-10 INJECTION, SOLUTION INTRAVENOUS; SUBCUTANEOUS
Status: DISCONTINUED | OUTPATIENT
Start: 2024-08-10 | End: 2024-08-11

## 2024-08-10 RX ORDER — METOPROLOL TARTRATE 25 MG/1
12.5 TABLET, FILM COATED ORAL 2 TIMES DAILY
Status: DISCONTINUED | OUTPATIENT
Start: 2024-08-10 | End: 2024-08-11

## 2024-08-10 RX ADMIN — ONDANSETRON 4 MG: 2 INJECTION INTRAMUSCULAR; INTRAVENOUS at 20:58

## 2024-08-10 RX ADMIN — ONDANSETRON 4 MG: 2 INJECTION INTRAMUSCULAR; INTRAVENOUS at 17:47

## 2024-08-10 RX ADMIN — SODIUM CHLORIDE, POTASSIUM CHLORIDE, SODIUM LACTATE AND CALCIUM CHLORIDE 500 ML: 600; 310; 30; 20 INJECTION, SOLUTION INTRAVENOUS at 09:11

## 2024-08-10 RX ADMIN — ONDANSETRON 4 MG: 2 INJECTION INTRAMUSCULAR; INTRAVENOUS at 12:24

## 2024-08-10 RX ADMIN — ALBUMIN HUMAN 25 G: 0.05 INJECTION, SOLUTION INTRAVENOUS at 09:13

## 2024-08-10 RX ADMIN — ALBUMIN HUMAN 25 G: 0.05 INJECTION, SOLUTION INTRAVENOUS at 02:06

## 2024-08-10 RX ADMIN — ATORVASTATIN CALCIUM 80 MG: 80 TABLET, FILM COATED ORAL at 20:32

## 2024-08-10 RX ADMIN — ACETAMINOPHEN 650 MG: 325 TABLET ORAL at 00:54

## 2024-08-10 RX ADMIN — OXYCODONE HYDROCHLORIDE 10 MG: 5 TABLET ORAL at 07:35

## 2024-08-10 RX ADMIN — CEFAZOLIN SODIUM 2 G: 2 INJECTION, SOLUTION INTRAVENOUS at 13:40

## 2024-08-10 RX ADMIN — METOPROLOL TARTRATE 12.5 MG: 25 TABLET, FILM COATED ORAL at 20:32

## 2024-08-10 RX ADMIN — HEPARIN SODIUM 5000 UNITS: 5000 INJECTION INTRAVENOUS; SUBCUTANEOUS at 11:32

## 2024-08-10 RX ADMIN — OXYCODONE HYDROCHLORIDE 10 MG: 5 TABLET ORAL at 03:37

## 2024-08-10 RX ADMIN — OXYCODONE HYDROCHLORIDE 10 MG: 5 TABLET ORAL at 19:30

## 2024-08-10 RX ADMIN — POLYETHYLENE GLYCOL 3350 17 G: 17 POWDER, FOR SOLUTION ORAL at 20:33

## 2024-08-10 RX ADMIN — INSULIN LISPRO 2 UNITS: 100 INJECTION, SOLUTION INTRAVENOUS; SUBCUTANEOUS at 17:47

## 2024-08-10 RX ADMIN — ASPIRIN 81 MG 81 MG: 81 TABLET ORAL at 11:32

## 2024-08-10 RX ADMIN — CEFAZOLIN SODIUM 2 G: 2 INJECTION, SOLUTION INTRAVENOUS at 20:32

## 2024-08-10 RX ADMIN — INSULIN LISPRO 5 UNITS: 100 INJECTION, SOLUTION INTRAVENOUS; SUBCUTANEOUS at 05:31

## 2024-08-10 RX ADMIN — ACETAMINOPHEN 650 MG: 325 TABLET ORAL at 13:39

## 2024-08-10 RX ADMIN — SODIUM CHLORIDE, POTASSIUM CHLORIDE, SODIUM LACTATE AND CALCIUM CHLORIDE 500 ML: 600; 310; 30; 20 INJECTION, SOLUTION INTRAVENOUS at 03:38

## 2024-08-10 RX ADMIN — HEPARIN SODIUM 5000 UNITS: 5000 INJECTION INTRAVENOUS; SUBCUTANEOUS at 20:32

## 2024-08-10 RX ADMIN — ACETAMINOPHEN 650 MG: 325 TABLET ORAL at 06:37

## 2024-08-10 RX ADMIN — SENNOSIDES AND DOCUSATE SODIUM 2 TABLET: 50; 8.6 TABLET ORAL at 11:32

## 2024-08-10 RX ADMIN — CEFAZOLIN SODIUM 2 G: 2 INJECTION, SOLUTION INTRAVENOUS at 05:08

## 2024-08-10 RX ADMIN — OXYCODONE HYDROCHLORIDE 10 MG: 5 TABLET ORAL at 11:32

## 2024-08-10 RX ADMIN — SENNOSIDES AND DOCUSATE SODIUM 2 TABLET: 50; 8.6 TABLET ORAL at 20:32

## 2024-08-10 RX ADMIN — QUETIAPINE FUMARATE 25 MG: 25 TABLET ORAL at 19:31

## 2024-08-10 RX ADMIN — OXYCODONE HYDROCHLORIDE 10 MG: 5 TABLET ORAL at 15:34

## 2024-08-10 RX ADMIN — LIDOCAINE 1 PATCH: 4 PATCH TOPICAL at 13:40

## 2024-08-10 RX ADMIN — HYDROMORPHONE HYDROCHLORIDE 0.2 MG: 1 INJECTION, SOLUTION INTRAMUSCULAR; INTRAVENOUS; SUBCUTANEOUS at 01:02

## 2024-08-10 RX ADMIN — PANTOPRAZOLE SODIUM 40 MG: 40 TABLET, DELAYED RELEASE ORAL at 06:38

## 2024-08-10 RX ADMIN — POLYETHYLENE GLYCOL 3350 17 G: 17 POWDER, FOR SOLUTION ORAL at 11:32

## 2024-08-10 ASSESSMENT — COGNITIVE AND FUNCTIONAL STATUS - GENERAL
MOVING FROM LYING ON BACK TO SITTING ON SIDE OF FLAT BED WITH BEDRAILS: A LITTLE
MOBILITY SCORE: 18
MOVING TO AND FROM BED TO CHAIR: A LITTLE
WALKING IN HOSPITAL ROOM: A LITTLE
STANDING UP FROM CHAIR USING ARMS: A LITTLE
CLIMB 3 TO 5 STEPS WITH RAILING: A LOT
MOBILITY SCORE: 16
CLIMB 3 TO 5 STEPS WITH RAILING: A LOT
TURNING FROM BACK TO SIDE WHILE IN FLAT BAD: A LOT
DAILY ACTIVITIY SCORE: 20
DRESSING REGULAR UPPER BODY CLOTHING: A LITTLE
MOVING TO AND FROM BED TO CHAIR: A LITTLE
WALKING IN HOSPITAL ROOM: A LITTLE
DRESSING REGULAR LOWER BODY CLOTHING: A LITTLE
HELP NEEDED FOR BATHING: A LITTLE
STANDING UP FROM CHAIR USING ARMS: A LITTLE
TOILETING: A LITTLE
TURNING FROM BACK TO SIDE WHILE IN FLAT BAD: A LITTLE

## 2024-08-10 ASSESSMENT — PAIN SCALES - GENERAL
PAINLEVEL_OUTOF10: 9
PAINLEVEL_OUTOF10: 8
PAINLEVEL_OUTOF10: 8
PAINLEVEL_OUTOF10: 7
PAINLEVEL_OUTOF10: 8
PAINLEVEL_OUTOF10: 9

## 2024-08-10 ASSESSMENT — PAIN - FUNCTIONAL ASSESSMENT
PAIN_FUNCTIONAL_ASSESSMENT: 0-10

## 2024-08-10 ASSESSMENT — ACTIVITIES OF DAILY LIVING (ADL): ADL_ASSISTANCE: INDEPENDENT

## 2024-08-10 ASSESSMENT — PAIN DESCRIPTION - ORIENTATION: ORIENTATION: MID

## 2024-08-10 ASSESSMENT — PAIN DESCRIPTION - LOCATION: LOCATION: BACK

## 2024-08-10 NOTE — PROGRESS NOTES
"CTICU Progress Note  Nevaeh Fair/[id number]    Admit Date: 8/9/2024  Hospital Length of Stay: 1   ICU Length of Stay: 1d 1h   CT SURGEON:     SUBJECTIVE:   Overnight, extubated and levophed weaned down to 0.04. Very high urine output this morning.    MEDICATIONS  Infusions:  lactated Ringer's, Last Rate: 10 mL/hr (08/10/24 0700)  lactated Ringer's, Last Rate: 5 mL/hr (08/10/24 0700)      Scheduled:  acetaminophen, 650 mg, q6h  aspirin, 81 mg, Daily  atorvastatin, 80 mg, Nightly  ceFAZolin, 2 g, q8h  heparin (porcine), 5,000 Units, q8h  insulin lispro, 0-15 Units, TID  lidocaine, 1 patch, q24h  metoprolol tartrate, 12.5 mg, BID  oxyCODONE, 10 mg, Once  pantoprazole, 40 mg, Daily before breakfast  polyethylene glycol, 17 g, BID  QUEtiapine, 25 mg, Nightly  sennosides-docusate sodium, 2 tablet, BID      PRN:  calcium gluconate, 1 g, q6h PRN  calcium gluconate, 2 g, q6h PRN  dextrose, 25 g, q15 min PRN   Or  glucagon, 1 mg, q15 min PRN  HYDROmorphone, 0.2 mg, q2h PRN  magnesium sulfate, 2 g, q6h PRN  magnesium sulfate, 4 g, q6h PRN  naloxone, 0.2 mg, q5 min PRN  oxyCODONE, 10 mg, q4h PRN  oxyCODONE, 5 mg, q4h PRN  potassium chloride CR, 20 mEq, q6h PRN   Or  potassium chloride, 20 mEq, q6h PRN  potassium chloride CR, 40 mEq, q6h PRN   Or  potassium chloride, 40 mEq, q6h PRN  potassium chloride, 20 mEq, q6h PRN  potassium chloride, 40 mEq, q6h PRN        PHYSICAL EXAM:   Visit Vitals  /73   Pulse 89   Temp 36.4 °C (97.5 °F)   Resp 10   Ht 1.651 m (5' 5\")   Wt 72 kg (158 lb 11.7 oz)   SpO2 98%   BMI 26.41 kg/m²   OB Status Menopausal   Smoking Status Former   BSA 1.82 m²     Wt Readings from Last 5 Encounters:   08/10/24 72 kg (158 lb 11.7 oz)   08/06/24 68.3 kg (150 lb 9.2 oz)   07/23/24 67.6 kg (149 lb)   07/15/24 67.8 kg (149 lb 6.4 oz)   07/10/24 65.8 kg (145 lb)     INTAKE/OUTPUT:  I/O last 3 completed shifts:  In: 6544.5 (90.9 mL/kg) [I.V.:413.3 (5.7 mL/kg); Blood:1240; IV Piggyback:4891.2]  Out: 4160 (57.8 " mL/kg) [Urine:3120 (1.2 mL/kg/hr); Blood:250; Chest Tube:790]  Weight: 72 kg        LDA:  CVC 08/09/24 Double lumen Right Internal jugular (Active)   Placement Date/Time: 08/09/24 (c) 0758   Hand Hygiene Performed Prior to CVC Insertion: Yes  Site Prep: Chlorhexidine   Site Prep Agent has Completely Dried Before Insertion: Yes  All 5 Sterile Barriers Used (Gloves, Gown, Cap, Mask, Large Sterile Mary...   Number of days: 1       Arterial Line 08/09/24 Left Brachial (Active)   Placement Date/Time: 08/09/24 (c) 0739   Size: 20 G  Orientation: Left  Location: Brachial  Securement Method: Taped  Patient Tolerance: Tolerated well   Number of days: 1       Urethral Catheter Double-lumen;Non-latex;Temperature probe 14 Fr. (Active)   Placement Date/Time: 08/09/24 0745   Placed by: Jovany Lucero SA  Hand Hygiene Completed: Yes  Catheter Type: Double-lumen;Non-latex;Temperature probe  Tube Size (Fr.): 14 Fr.  Catheter Balloon Size: 10 mL  Urine Returned: Yes   Number of days: 1       Chest Tube 2 (Active)   Placement Date: 08/09/24   Placed by: OR  Tube Number: 2  Chest Tube Drainage System: Suction   Number of days: 1       Y Chest Tube 1 and 2 Mediastinal Pleural (Active)   Placement Date: 08/09/24   Earliest Known Present: 08/09/24  Placed by: OR  Chest Tube Location 1: Mediastinal  Chest Tube Location 2: Pleural   Number of days: 1       Physical Exam:   Constitutional: female patient in no acute distress  Skin: Warm and dry throughout.    Eyes: Anicteric sclera, PERRL, clear  Head/Neck: Right IJ MAC, dressing c,d,i - no hematoma or bleeding  Respiratory/Thorax: lear breath sounds throughout. Minimal ETT /oral secretions - clear.   Bilateral pleural and 1 mediastinal chest tube to -20 suction, each to separate atrium, serosanguinous drainage.    Ventricular Pacing wires present.   Cardiovascular:  RRR. No murmurs appreciated. NSR on tele with HR 90s. Peripheral pulses intact. Sternum stable - dressing CDI     Gastrointestinal: Abdomen soft, nontender, nondistended.  Genitourinary: Pryor in place draining darker yellow urine  Musculoskeletal: HALL, no joint edema or erythema noted  Extremities: Palpable radial pulses bilaterally 2+. 1+ pulses to bilateral PT/DP. SCDs to BLE. No clubbing or cyanosis to nailbeds, warm throughout.  No pitting edema.  Left brachial arterial line, dressing c,d,i - no hematoma  Neurological: Alert, conversant, no obvious focal deficits      Images:     Invasive Hemodynamics:    Most Recent Range Past 24hrs   BP (Art) 148/65 Arterial Line BP 1  Min: 85/42  Max: 148/65   MAP(Art) 100 mmHg Arterial Line MAP 1 (mmHg)   Min: 58 mmHg  Max: 100 mmHg   RA/CVP   No data recorded   PA   No data recorded   PA(mean)   No data recorded   CO   No data recorded   CI   No data recorded   Mixed Venous   No data recorded   SVR    No data recorded     Daily Risk Screen:  Hemodynamic monitoring and Parenteral medication  perioperative use for selected surgical procedures      Assessment/Plan   Nevaeh Fair is a 78 y.o. with PMH of anxiety on chronic benzos, chronic pain on opioids, tremors, SAH, CAD, HTN, HLD, R ICA stenosis, GERD and DMII who was having worsening exertional angina and was found to have MVD on LHC.      Now presents to the CTICU from the OR s/p off pump CABG x 3, closed with sternal plates on 8/9/24 with Dr. Bigg Tellez.     Plan:  NEURO:  Hx of anxiety on chronic benzos, chronic pain on opioids, tremors, and SAH.  Acute post op pain.  Patient is currently intubated and sedated on propofol infusion.  No obvious focal deficits.  -->   - Serial neuro and pain assessments   - Scheduled Tylenol   - PRN oxycodone and Dilaudid for pain   - Lidoderm patches x3 days   - PT/OT Consult, OOB to chair as tolerated, chair position if not tolerated   - CAM ICU score qshift.  Sleep/wake cycle hygiene   - hold home Xanax and Seroquel     CV:  Hx of CAD, HTN, HLD, R ICA stenosis 50-69%.  Now s/p off pump CABG x  3, closed with sternal plates on 8/9 with Ruda Tellez. Pre & post cardiac function:normal BiV.  V wires set to VVI 50,  SR on monitor.  Arrived to ICU on no vasoactives. -->  - Continuous EKG and ABP monitoring  - goal MAP > 65.  Allow for permissive HTN postop, Ruda tellez ok if MAPs 90-100s given known carotid stenosis.  - High urine output and low CVP this AM, will give fluid resuscitation  - Wean off levophed after fluid resuscitation  - Maintain pacer VVI 50 backup   - Start ASA 81 mg daily today.    - Start high dose statin tomorrow     PULM:  Hx of ex smoking.  Currently intubated on ventilator with appropriate oxygenation and ventilation.  Chest tubes with no airleak and appropriate serosang output, to suction.  -->  - f/u daily CXR while in ICU  - Wean FiO2 maintaining SpO2 >92%.   - ABGs as needed  - Aggressive BPH, IS q1h and OOB to chair when extubated  - Maintain chest tubes to wall suction, remove per unit protocol     I have discussed the case with the resident/advanced practice provider. I have personally performed a history, physical exam, and my own medical decision making. I have reviewed the note and agree with the findings and plan with the following exceptions as identified below. I have personally provided 38 minutes of critical care time exclusive of time spent on separately billable procedures. Time includes review of laboratory data, radiology results, discussion with consultants, family and monitoring for potential decompensation. Interventions were performed as documented above.      Family/Surrogate updated with plan of care.  Code status addressed/up to date.            A,B,C,D,E,F,G: reviewed     Dispo: CTICU care for now.    CTICU TEAM PHONE [phone]

## 2024-08-10 NOTE — PROGRESS NOTES
Physical Therapy    Physical Therapy Evaluation & Treatment    Patient Name: Nevaeh Fair  MRN: [id number]  Today's Date: 8/10/2024   Time Calculation  Start Time: 1036  Stop Time: 1117  Time Calculation (min): 41 min    Assessment/Plan   PT Assessment  PT Assessment Results: Decreased strength, Decreased endurance, Impaired balance, Decreased mobility, Pain  Rehab Prognosis: Good  Barriers to Discharge: Pain  Evaluation/Treatment Tolerance: Patient tolerated treatment well  Medical Staff Made Aware: Yes  End of Session Communication: Bedside nurse  Assessment Comment: Pt performed bed mobility, functional transfers, and ambulated 30' with FWW with CGA. Pt limited due to pain throughout session; pt will continue to benefit from skilled PT to improve functional mobility.  End of Session Patient Position: Up in chair, Alarm off, not on at start of session   IP OR SWING BED PT PLAN  Inpatient or Swing Bed: Inpatient  PT Plan  Treatment/Interventions: Bed mobility, Transfer training, Balance training, Stair training, Gait training, Strengthening, Endurance training, Therapeutic exercise, Therapeutic activity  PT Plan: Ongoing PT  PT Frequency: 3 times per week  PT Discharge Recommendations: Low intensity level of continued care  Equipment Recommended upon Discharge: Wheeled walker  PT Recommended Transfer Status: Assist x1  PT - OK to Discharge: Yes      Subjective     General Visit Information:  General  Reason for Referral: presents to the CTICU from the OR s/p off pump CABG x 3, closed with sternal plates on 8/9/24 with Dr. Bigg Tellez.  Past Medical History Relevant to Rehab: PMH of SAH, anxiety on chronic benzos, chronic pain with multiple opioid dispenses in the past year per [facility], tremors, ex smoker, CAD, HTN, HLD, R ICA stenosis, GERD and DMII who was having worsening exertional angina and was found to have MVD on LHC.  Prior to Session Communication: Bedside nurse  Patient Position Received: Bed, 3 rail up,  Alarm off, not on at start of session  Preferred Learning Style: auditory, visual, verbal  General Comment: Pt awake, alert, and willing to participate in PT evaluation; pt joined by supportive family throughout session. (Lines: IV (VIP, norepi 0.03, IV fluid), A line, 2 chest tubes, Central line, heller, 2L O2 NC)  Home Living:  Home Living  Type of Home: House  Lives With: Spouse  Home Adaptive Equipment: Cane  Home Layout: One level  Home Access: Stairs to enter with rails  Entrance Stairs-Number of Steps: 6  Bathroom Shower/Tub: Tub/shower unit  Bathroom Equipment: Grab bars in shower  Home Living Comments: Laundry in basement  Prior Level of Function:  Prior Function Per Pt/Caregiver Report  Level of [place]: Independent with ADLs and functional transfers  Receives Help From: Family  ADL Assistance: Independent  Homemaking Assistance: Independent  Ambulatory Assistance: Independent  Vocational: Part time employment (Works at [employer])  Prior Function Comments: Drives, no falls  Precautions:  Precautions  Hearing/Visual Limitations: WFL, pt wears glasses for reading  Medical Precautions: Fall precautions, Cardiac precautions  Post-Surgical Precautions: Move in the Tube  Precautions Comment: MAP>65  Vital Signs:  Vital Signs  Heart Rate: 91 (88)  Heart Rate Source: Monitor  SpO2: 98 % (96)  BP: 124/52 (122/96)  MAP (mmHg): 79 (105)  BP Method: Arterial line  Patient Position: Lying    Objective   Pain:  Pain Assessment  Pain Assessment: 0-10  0-10 (Numeric) Pain Score: 8  Pain Type: Acute pain, Surgical pain  Pain Location: Chest  Pain Interventions: Ambulation/increased activity, Repositioned  Response to Interventions: Pt resting comfortably in chair at end of session; RN notified of pt request for pain meds  Cognition:  Cognition  Overall Cognitive Status: Within Functional Limits  Orientation Level: Oriented X4    General Assessments:    Activity Tolerance  Endurance: Tolerates 10 - 20 min  exercise with multiple rests  Early Mobility/Exercise Safety Screen: Proceed with mobilization - No exclusion criteria met  Activity Tolerance Comments: Pt with good tolerance to session; vitals remained stable however pt reporting pain throughout session    Sensation  Light Touch: No apparent deficits    Strength  Strength Comments: Not formally assessed; grossly at least 3/5 based on functional mobility  Strength  Strength Comments: Not formally assessed; grossly at least 3/5 based on functional mobility    Perception  Inattention/Neglect: Appears intact  Initiation: Appears intact  Motor Planning: Appears intact  Perseveration: Not present      Coordination  Movements are Fluid and Coordinated: Yes    Postural Control  Postural Control: Within Functional Limits    Static Sitting Balance  Static Sitting-Balance Support: Feet supported, Bilateral upper extremity supported  Static Sitting-Level of Assistance: Contact guard    Static Standing Balance  Static Standing-Balance Support: Bilateral upper extremity supported  Static Standing-Level of Assistance: Contact guard  Functional Assessments:  Bed Mobility  Bed Mobility: Yes  Bed Mobility 1  Bed Mobility 1: Supine to sitting  Level of Assistance 1: Moderate assistance  Bed Mobility Comments 1: Mod A for trunk elevation to seated and for LE management; progressed to CGA in sitting    Transfers  Transfer: Yes  Transfer 1  Transfer From 1: Sit to, Stand to  Transfer to 1: Stand, Sit  Technique 1: Sit to stand, Stand to sit  Transfer Device 1: Walker  Transfer Level of Assistance 1: Contact guard, Minimal verbal cues  Trials/Comments 1: CGA for safety and line management; verbal cues for hand placement on FWW    Ambulation/Gait Training  Ambulation/Gait Training Performed: Yes  Ambulation/Gait Training 1  Surface 1: Level tile  Device 1: Rolling walker  Assistance 1: Contact guard  Quality of Gait 1: Narrow base of support, Decreased step length, Forward flexed  posture  Comments/Distance (ft) 1: CGA for safety and line management; pt ambulated 30' with FWW. Pt reporting pain throughout bout and requesting to sit due to fatigue  Extremity/Trunk Assessments:  Cervical Spine   Cervical Spine: Within Functional Limits  Lumbar Spine   Lumbar Spine : Within Functional Limits    RLE   RLE : Within Functional Limits  LLE   LLE : Within Functional Limits  Treatments:  Ambulation/Gait Training  Ambulation/Gait Training Performed: Yes  Ambulation/Gait Training 1  Surface 1: Level tile  Device 1: Rolling walker  Assistance 1: Contact guard  Quality of Gait 1: Narrow base of support, Decreased step length, Forward flexed posture  Comments/Distance (ft) 1: CGA for safety and line management; pt ambulated 30' with FWW. Pt reporting pain throughout bout and requesting to sit due to fatigue  Outcome Measures:  [facility] Basic Mobility  Turning from your back to your side while in a flat bed without using bedrails: A little  Moving from lying on your back to sitting on the side of a flat bed without using bedrails: A lot  Moving to and from bed to chair (including a wheelchair): A little  Standing up from a chair using your arms (e.g. wheelchair or bedside chair): A little  To walk in hospital room: A little  Climbing 3-5 steps with railing: A lot  Basic Mobility - Total Score: 16    FSS-ICU  Ambulation: Walks <50 feet with any assistance x1 or walks any distance with assistance x2 people  Rolling: Minimal assistance (performs 75% or more of task)  Sitting: Minimal assistance (performs 75% or more of task)  Transfer Sit-to-Stand: Minimal assistance (performs 75% or more of task)  Transfer Supine-to-Sit: Moderate assistance (performs 50 - 74% of task)  Total Score: 16      Early Mobility/Exercise Safety Screen: Proceed with mobilization - No exclusion criteria met  ICU Mobility Scale: Walking with assistance of 1 person [8]    Encounter Problems       Encounter Problems (Active)       Balance        Pt will demonstrate improved sitting/standing static/dynamic balance activities without LOB for increase in safety prior to DC.  (Progressing)       Start:  08/10/24    Expected End:  08/24/24               Mobility       Pt will ambulate 100ft with appropriate form, LRAD, CGA or less, and no LOB for safe DC.  (Progressing)       Start:  08/10/24    Expected End:  08/24/24            Pt will ambulate up/down 6 stairs with hand rail with CGA or less to safely access their home upon DC.   (Progressing)       Start:  08/10/24    Expected End:  08/24/24            Pt will tolerate >15 minutes of upright standing activity without seated rest breaks with no changes in vital signs for improved functional mobility.  (Progressing)       Start:  08/10/24    Expected End:  08/24/24               PT Transfers       Pt will perform bed mobility and sit<>stand transfers with SBA or less and use of LRAD to safely DC.  (Progressing)       Start:  08/10/24    Expected End:  08/24/24                   Education Documentation  No documentation found.  Education Comments  No comments found.    ROLAND ESCOBAR, PT

## 2024-08-10 NOTE — PROGRESS NOTES
"CTICU Progress Note  Nevaeh Fair/[id number]    Admit Date: 8/9/2024  Hospital Length of Stay: 1   ICU Length of Stay: 19h   CT SURGEON:     SUBJECTIVE:   Overnight, extubated and levophed weaned down to 0.04. Very high urine output this morning.    MEDICATIONS  Infusions:  lactated Ringer's, Last Rate: 10 mL/hr (08/10/24 0700)  lactated Ringer's, Last Rate: 5 mL/hr (08/10/24 0700)  norepinephrine, Last Rate: 0.04 mcg/kg/min (08/10/24 0700)      Scheduled:  acetaminophen, 650 mg, q6h  aspirin, 81 mg, Daily  atorvastatin, 80 mg, Nightly  ceFAZolin, 2 g, q8h  heparin (porcine), 5,000 Units, q8h  insulin lispro, 0-15 Units, TID  lidocaine, 1 patch, q24h  [Held by provider] metoprolol tartrate, 12.5 mg, BID  oxyCODONE, 10 mg, Once  pantoprazole, 40 mg, Daily before breakfast  polyethylene glycol, 17 g, BID  QUEtiapine, 25 mg, Nightly  sennosides-docusate sodium, 2 tablet, BID      PRN:  calcium gluconate, 1 g, q6h PRN  calcium gluconate, 2 g, q6h PRN  dextrose, 25 g, q15 min PRN   Or  glucagon, 1 mg, q15 min PRN  HYDROmorphone, 0.2 mg, q2h PRN  magnesium sulfate, 2 g, q6h PRN  magnesium sulfate, 4 g, q6h PRN  naloxone, 0.2 mg, q5 min PRN  oxyCODONE, 10 mg, q4h PRN  oxyCODONE, 5 mg, q4h PRN  potassium chloride CR, 20 mEq, q6h PRN   Or  potassium chloride, 20 mEq, q6h PRN  potassium chloride CR, 40 mEq, q6h PRN   Or  potassium chloride, 40 mEq, q6h PRN  potassium chloride, 20 mEq, q6h PRN  potassium chloride, 40 mEq, q6h PRN        PHYSICAL EXAM:   Visit Vitals  /81   Pulse 70   Temp 36 °C (96.8 °F) (Temporal)   Resp (!) 8   Ht 1.651 m (5' 5\")   Wt 72 kg (158 lb 11.7 oz)   SpO2 98%   BMI 26.41 kg/m²   OB Status Menopausal   Smoking Status Former   BSA 1.82 m²     Wt Readings from Last 5 Encounters:   08/10/24 72 kg (158 lb 11.7 oz)   08/06/24 68.3 kg (150 lb 9.2 oz)   07/23/24 67.6 kg (149 lb)   07/15/24 67.8 kg (149 lb 6.4 oz)   07/10/24 65.8 kg (145 lb)     INTAKE/OUTPUT:  I/O last 3 completed shifts:  In: 6544.5 " (90.9 mL/kg) [I.V.:413.3 (5.7 mL/kg); Blood:1240; IV Piggyback:4891.2]  Out: 3720 (51.7 mL/kg) [Urine:3120 (1.2 mL/kg/hr); Blood:250; Chest Tube:350]  Weight: 72 kg        LDA:  CVC 08/09/24 Double lumen Right Internal jugular (Active)   Placement Date/Time: 08/09/24 (c) 0752   Hand Hygiene Performed Prior to CVC Insertion: Yes  Site Prep: Chlorhexidine   Site Prep Agent has Completely Dried Before Insertion: Yes  All 5 Sterile Barriers Used (Gloves, Gown, Cap, Mask, Large Sterile Mary...   Number of days: 1       Arterial Line 08/09/24 Left Brachial (Active)   Placement Date/Time: 08/09/24 (c) 0779   Size: 20 G  Orientation: Left  Location: Brachial  Securement Method: Taped  Patient Tolerance: Tolerated well   Number of days: 1       Urethral Catheter Double-lumen;Non-latex;Temperature probe 14 Fr. (Active)   Placement Date/Time: 08/09/24 0745   Placed by: Jovany Lucero SA  Hand Hygiene Completed: Yes  Catheter Type: Double-lumen;Non-latex;Temperature probe  Tube Size (Fr.): 14 Fr.  Catheter Balloon Size: 10 mL  Urine Returned: Yes   Number of days: 1       Chest Tube 2 (Active)   Placement Date: 08/09/24   Placed by: OR  Tube Number: 2  Chest Tube Drainage System: Suction   Number of days: 1       Y Chest Tube 1 and 2 Mediastinal Pleural (Active)   Placement Date: 08/09/24   Earliest Known Present: 08/09/24  Placed by: OR  Chest Tube Location 1: Mediastinal  Chest Tube Location 2: Pleural   Number of days: 1       Physical Exam:   Constitutional: female patient in no acute distress  Skin: Warm and dry throughout.    Eyes: Anicteric sclera, PERRL, clear  Head/Neck: Right IJ MAC, dressing c,d,i - no hematoma or bleeding  Respiratory/Thorax: lear breath sounds throughout. Minimal ETT /oral secretions - clear.   Bilateral pleural and 1 mediastinal chest tube to -20 suction, each to separate atrium, serosanguinous drainage.    Ventricular Pacing wires present.   Cardiovascular:  RRR. No murmurs appreciated. NSR  on tele with HR 90s. Peripheral pulses intact. Sternum stable - dressing CDI    Gastrointestinal: Abdomen soft, nontender, nondistended.  Genitourinary: Pryor in place draining darker yellow urine  Musculoskeletal: HALL, no joint edema or erythema noted  Extremities: Palpable radial pulses bilaterally 2+. 1+ pulses to bilateral PT/DP. SCDs to BLE. No clubbing or cyanosis to nailbeds, warm throughout.  No pitting edema.  Left brachial arterial line, dressing c,d,i - no hematoma  Neurological: Alert, conversant, no obvious focal deficits      Images:     Invasive Hemodynamics:    Most Recent Range Past 24hrs   BP (Art) 106/47 Arterial Line BP 1  Min: 75/42  Max: 136/70   MAP(Art) 69 mmHg Arterial Line MAP 1 (mmHg)   Min: 56 mmHg  Max: 99 mmHg   RA/CVP   No data recorded   PA   No data recorded   PA(mean)   No data recorded   CO   No data recorded   CI   No data recorded   Mixed Venous   No data recorded   SVR    No data recorded     Daily Risk Screen:  Hemodynamic monitoring and Parenteral medication  perioperative use for selected surgical procedures      Assessment/Plan   Nevaeh Fair is a 78 y.o. with PMH of anxiety on chronic benzos, chronic pain on opioids, tremors, SAH, CAD, HTN, HLD, R ICA stenosis, GERD and DMII who was having worsening exertional angina and was found to have MVD on [facility].      Now presents to the CTICU from the OR s/p off pump CABG x 3, closed with sternal plates on 8/9/24 with Dr. Bigg Tellez.     Plan:  NEURO:  Hx of anxiety on chronic benzos, chronic pain on opioids, tremors, and SAH.  Acute post op pain.  Patient is currently intubated and sedated on propofol infusion.  No obvious focal deficits.  -->   - Serial neuro and pain assessments   - Scheduled Tylenol   - PRN oxycodone and Dilaudid for pain   - Lidoderm patches x3 days   - PT/OT Consult, OOB to chair as tolerated, chair position if not tolerated   - CAM ICU score qshift.  Sleep/wake cycle hygiene   - hold home Xanax and Seroquel      CV:  Hx of CAD, HTN, HLD, R ICA stenosis 50-69%.  Now s/p off pump CABG x 3, closed with sternal plates on 8/9 with Ruda Tellez. Pre & post cardiac function:normal BiV.  V wires set to VVI 50,  SR on monitor.  Arrived to ICU on no vasoactives. -->  - Continuous EKG and ABP monitoring  - goal MAP > 65.  Allow for permissive HTN postop, Ruda tellez ok if MAPs 90-100s given known carotid stenosis.  - High urine output and low CVP this AM, will give fluid resuscitation  - Wean off levophed after fluid resuscitation  - Maintain pacer VVI 50 backup   - Start ASA 81 mg daily today.    - Start high dose statin tomorrow     PULM:  Hx of ex smoking.  Currently intubated on ventilator with appropriate oxygenation and ventilation.  Chest tubes with no airleak and appropriate serosang output, to suction.  -->  - f/u daily CXR while in ICU  - Wean FiO2 maintaining SpO2 >92%.   - ABGs as needed  - Aggressive BPH, IS q1h and OOB to chair when extubated  - Maintain chest tubes to wall suction, remove per unit protocol     GI:  No hx of GERD.  -->  - Continue PPI until extubated  - Regular diet  - Senna-Colace and miralax BID for bowel regimen post op     :  No hx, baseline Cr 1.01.  Arrival to ICU NICOLA risk score: low. Heller in place  -->  - Continue heller catheter for strict I/Os.    - RFP as clinically indicated.  Replete electrolytes per CTICU protocol.     ENDO: Hx of DM2, A1c 6.1.  Postoperative hyperglycemia well controlled with SSI. -->  - Maintain BG <180, SSI q4 per CTICU protocol     HEME:  No hx.  Acute blood loss anemia without active s/s of bleeding.   -->    - Monitor drain output volume and characteristics  - SQH  - CBC, coags, and fibrinogen post op and as clinically indicated  - SCDs for DVT prophylaxis.  Hold SQH POD0 d/t increased risk of bleed.   - last type and screen: 8/9     ID:  MRSA negative.  Afebrile, no current indications of infection -->  - Trend temp q4h  - Periop Ancef x48hrs     I have discussed the  case with the resident/advanced practice provider. I have personally performed a history, physical exam, and my own medical decision making. I have reviewed the note and agree with the findings and plan with the following exceptions as identified below. I have personally provided 30 minutes of critical care time exclusive of time spent on separately billable procedures. Time includes review of laboratory data, radiology results, discussion with consultants, family and monitoring for potential decompensation. Interventions were performed as documented above.           A,B,C,D,E,F,G: reviewed     Dispo: CTICU care for now.    CTICU TEAM PHONE [phone]     Patient Active Problem List    Diagnosis Date Noted    Exudative age-related macular degeneration, bilateral, with active choroidal neovascularization (Multi) 06/29/2024    Abnormal weight gain 08/21/2023    Action tremor 08/21/2023    Acute lumbar radiculopathy 08/21/2023    Bone disorder 08/21/2023    Dizziness 08/21/2023    Dysuria 08/21/2023    Function kidney decreased 08/21/2023    Hematuria 08/21/2023    Disorder of glucose metabolism (Multi) 08/21/2023    Increased liver enzymes 08/21/2023    Insomnia 08/21/2023    Nausea 08/21/2023    Overactive bladder 08/21/2023    Pseudophakia of both eyes 08/21/2023    Recurrent falls 08/21/2023    Recurrent UTI (urinary tract infection) 08/21/2023    Rhinorrhea 08/21/2023    Transient speech disturbance 08/21/2023    Vitamin B12 deficiency anemia 08/21/2023    Hyperlipemia, mixed 08/21/2023    Back pain 08/21/2023    Joint pain, hip 08/21/2023    Diabetes mellitus type 2, controlled (Multi) 06/08/2023    Anxiety 06/08/2023    B12 deficiency 06/08/2023    Vitamin D deficiency 06/08/2023    Syncope 06/08/2023    Lumbago 06/08/2023    Hyperlipemia 06/08/2023    Mood disorder (CMS-[facility])     Change in mental state 10/12/2018    Essential tremor 07/19/2018    Somnolence 07/19/2018    HTN, goal below 140/90 07/16/2018     Gastroesophageal reflux disease 07/16/2018    Arteriosclerosis of coronary artery 07/16/2018    Elevated LFTs 07/16/2018    Memory loss 07/16/2018    Overweight 07/16/2018    Prediabetes 07/16/2018    Near syncope 07/16/2018    Hypercholesteremia 07/16/2018    Greater trochanteric pain syndrome 07/28/2016    CHI (closed head injury) 01/10/2012    Subarachnoid hemorrhage (Multi) 12/13/2011    Coronary artery disease involving native coronary artery of native heart with unstable angina pectoris (Multi) 07/17/2024

## 2024-08-11 ENCOUNTER — APPOINTMENT (OUTPATIENT)
Dept: RADIOLOGY | Facility: HOSPITAL | Age: 79
DRG: 236 | End: 2024-08-11
Payer: MEDICARE

## 2024-08-11 VITALS
HEIGHT: 65 IN | HEART RATE: 77 BPM | BODY MASS INDEX: 26.45 KG/M2 | WEIGHT: 158.73 LBS | RESPIRATION RATE: 14 BRPM | TEMPERATURE: 97.9 F | DIASTOLIC BLOOD PRESSURE: 73 MMHG | OXYGEN SATURATION: 96 % | SYSTOLIC BLOOD PRESSURE: 150 MMHG

## 2024-08-11 LAB
ALBUMIN SERPL BCP-MCNC: 4 G/DL (ref 3.4–5)
ANION GAP SERPL CALC-SCNC: 13 MMOL/L (ref 10–20)
BUN SERPL-MCNC: 12 MG/DL (ref 6–23)
CALCIUM SERPL-MCNC: 9.1 MG/DL (ref 8.6–10.6)
CHLORIDE SERPL-SCNC: 103 MMOL/L (ref 98–107)
CO2 SERPL-SCNC: 27 MMOL/L (ref 21–32)
CREAT SERPL-MCNC: 0.84 MG/DL (ref 0.5–1.05)
EGFRCR SERPLBLD CKD-EPI 2021: 71 ML/MIN/1.73M*2
ERYTHROCYTE [DISTWIDTH] IN BLOOD BY AUTOMATED COUNT: 14.3 % (ref 11.5–14.5)
GLUCOSE BLD MANUAL STRIP-MCNC: 159 MG/DL (ref 74–99)
GLUCOSE SERPL-MCNC: 143 MG/DL (ref 74–99)
HCT VFR BLD AUTO: 27.3 % (ref 36–46)
HGB BLD-MCNC: 8.9 G/DL (ref 12–16)
MAGNESIUM SERPL-MCNC: 2.08 MG/DL (ref 1.6–2.4)
MCH RBC QN AUTO: 32.8 PG (ref 26–34)
MCHC RBC AUTO-ENTMCNC: 32.6 G/DL (ref 32–36)
MCV RBC AUTO: 101 FL (ref 80–100)
NRBC BLD-RTO: 0 /100 WBCS (ref 0–0)
PHOSPHATE SERPL-MCNC: 3.1 MG/DL (ref 2.5–4.9)
PLATELET # BLD AUTO: 124 X10*3/UL (ref 150–450)
POTASSIUM SERPL-SCNC: 3.8 MMOL/L (ref 3.5–5.3)
RBC # BLD AUTO: 2.71 X10*6/UL (ref 4–5.2)
SODIUM SERPL-SCNC: 139 MMOL/L (ref 136–145)
WBC # BLD AUTO: 7.1 X10*3/UL (ref 4.4–11.3)

## 2024-08-11 PROCEDURE — 1200000002 HC GENERAL ROOM WITH TELEMETRY DAILY

## 2024-08-11 PROCEDURE — 2500000004 HC RX 250 GENERAL PHARMACY W/ HCPCS (ALT 636 FOR OP/ED): Performed by: NURSE PRACTITIONER

## 2024-08-11 PROCEDURE — 2500000001 HC RX 250 WO HCPCS SELF ADMINISTERED DRUGS (ALT 637 FOR MEDICARE OP): Performed by: NURSE PRACTITIONER

## 2024-08-11 PROCEDURE — 80069 RENAL FUNCTION PANEL: CPT | Performed by: NURSE PRACTITIONER

## 2024-08-11 PROCEDURE — 2500000002 HC RX 250 W HCPCS SELF ADMINISTERED DRUGS (ALT 637 FOR MEDICARE OP, ALT 636 FOR OP/ED): Performed by: NURSE PRACTITIONER

## 2024-08-11 PROCEDURE — 82947 ASSAY GLUCOSE BLOOD QUANT: CPT

## 2024-08-11 PROCEDURE — 71045 X-RAY EXAM CHEST 1 VIEW: CPT

## 2024-08-11 PROCEDURE — 71045 X-RAY EXAM CHEST 1 VIEW: CPT | Performed by: RADIOLOGY

## 2024-08-11 PROCEDURE — 36415 COLL VENOUS BLD VENIPUNCTURE: CPT | Performed by: NURSE PRACTITIONER

## 2024-08-11 PROCEDURE — 83735 ASSAY OF MAGNESIUM: CPT | Performed by: NURSE PRACTITIONER

## 2024-08-11 PROCEDURE — 2500000005 HC RX 250 GENERAL PHARMACY W/O HCPCS: Performed by: NURSE PRACTITIONER

## 2024-08-11 PROCEDURE — 2500000001 HC RX 250 WO HCPCS SELF ADMINISTERED DRUGS (ALT 637 FOR MEDICARE OP): Performed by: STUDENT IN AN ORGANIZED HEALTH CARE EDUCATION/TRAINING PROGRAM

## 2024-08-11 PROCEDURE — 85027 COMPLETE CBC AUTOMATED: CPT | Performed by: NURSE PRACTITIONER

## 2024-08-11 RX ORDER — TRAMADOL HYDROCHLORIDE 50 MG/1
50 TABLET ORAL EVERY 6 HOURS PRN
Status: ACTIVE | OUTPATIENT
Start: 2024-08-11

## 2024-08-11 RX ORDER — TRAMADOL HYDROCHLORIDE 50 MG/1
25 TABLET ORAL ONCE
Status: COMPLETED | OUTPATIENT
Start: 2024-08-11 | End: 2024-08-11

## 2024-08-11 RX ORDER — FUROSEMIDE 10 MG/ML
40 INJECTION INTRAMUSCULAR; INTRAVENOUS ONCE
Status: COMPLETED | OUTPATIENT
Start: 2024-08-11 | End: 2024-08-11

## 2024-08-11 RX ORDER — POTASSIUM CHLORIDE 20 MEQ/1
40 TABLET, EXTENDED RELEASE ORAL ONCE
Status: COMPLETED | OUTPATIENT
Start: 2024-08-11 | End: 2024-08-11

## 2024-08-11 RX ORDER — ASPIRIN 81 MG/1
81 TABLET ORAL DAILY
Status: ACTIVE | OUTPATIENT
Start: 2024-08-12

## 2024-08-11 RX ORDER — ALPRAZOLAM 0.25 MG/1
0.5 TABLET ORAL NIGHTLY PRN
Status: ACTIVE | OUTPATIENT
Start: 2024-08-11

## 2024-08-11 RX ORDER — TRAMADOL HYDROCHLORIDE 50 MG/1
100 TABLET ORAL EVERY 6 HOURS PRN
Status: DISPENSED | OUTPATIENT
Start: 2024-08-11

## 2024-08-11 RX ORDER — HYDRALAZINE HYDROCHLORIDE 10 MG/1
10 TABLET, FILM COATED ORAL ONCE
Status: DISCONTINUED | OUTPATIENT
Start: 2024-08-11 | End: 2024-08-11

## 2024-08-11 RX ORDER — METOPROLOL TARTRATE 25 MG/1
25 TABLET, FILM COATED ORAL 2 TIMES DAILY
Status: DISPENSED | OUTPATIENT
Start: 2024-08-11

## 2024-08-11 RX ORDER — CYCLOBENZAPRINE HCL 10 MG
5 TABLET ORAL 3 TIMES DAILY
Status: DISPENSED | OUTPATIENT
Start: 2024-08-11 | End: 2024-08-14

## 2024-08-11 RX ADMIN — TRAMADOL HYDROCHLORIDE 25 MG: 50 TABLET, COATED ORAL at 06:08

## 2024-08-11 RX ADMIN — CYCLOBENZAPRINE 5 MG: 10 TABLET, FILM COATED ORAL at 21:30

## 2024-08-11 RX ADMIN — ACETAMINOPHEN 650 MG: 325 TABLET ORAL at 12:37

## 2024-08-11 RX ADMIN — HEPARIN SODIUM 5000 UNITS: 5000 INJECTION INTRAVENOUS; SUBCUTANEOUS at 21:31

## 2024-08-11 RX ADMIN — ACETAMINOPHEN 650 MG: 325 TABLET ORAL at 21:30

## 2024-08-11 RX ADMIN — ONDANSETRON 4 MG: 2 INJECTION INTRAMUSCULAR; INTRAVENOUS at 13:38

## 2024-08-11 RX ADMIN — METOPROLOL TARTRATE 25 MG: 25 TABLET, FILM COATED ORAL at 21:31

## 2024-08-11 RX ADMIN — CEFAZOLIN SODIUM 2 G: 2 INJECTION, SOLUTION INTRAVENOUS at 04:49

## 2024-08-11 RX ADMIN — ACETAMINOPHEN 650 MG: 325 TABLET ORAL at 00:18

## 2024-08-11 RX ADMIN — QUETIAPINE FUMARATE 25 MG: 25 TABLET ORAL at 21:30

## 2024-08-11 RX ADMIN — SENNOSIDES AND DOCUSATE SODIUM 2 TABLET: 50; 8.6 TABLET ORAL at 08:26

## 2024-08-11 RX ADMIN — TRAMADOL HYDROCHLORIDE 100 MG: 50 TABLET, COATED ORAL at 12:38

## 2024-08-11 RX ADMIN — TRAMADOL HYDROCHLORIDE 100 MG: 50 TABLET, COATED ORAL at 18:45

## 2024-08-11 RX ADMIN — PANTOPRAZOLE SODIUM 40 MG: 40 TABLET, DELAYED RELEASE ORAL at 06:08

## 2024-08-11 RX ADMIN — FUROSEMIDE 40 MG: 10 INJECTION, SOLUTION INTRAVENOUS at 12:37

## 2024-08-11 RX ADMIN — POLYETHYLENE GLYCOL 3350 17 G: 17 POWDER, FOR SOLUTION ORAL at 08:27

## 2024-08-11 RX ADMIN — INSULIN LISPRO 2 UNITS: 100 INJECTION, SOLUTION INTRAVENOUS; SUBCUTANEOUS at 08:28

## 2024-08-11 RX ADMIN — POTASSIUM CHLORIDE 40 MEQ: 1500 TABLET, EXTENDED RELEASE ORAL at 12:37

## 2024-08-11 RX ADMIN — ASPIRIN 81 MG 81 MG: 81 TABLET ORAL at 08:26

## 2024-08-11 RX ADMIN — ATORVASTATIN CALCIUM 80 MG: 80 TABLET, FILM COATED ORAL at 21:31

## 2024-08-11 RX ADMIN — HEPARIN SODIUM 5000 UNITS: 5000 INJECTION INTRAVENOUS; SUBCUTANEOUS at 04:49

## 2024-08-11 RX ADMIN — ACETAMINOPHEN 650 MG: 325 TABLET ORAL at 06:08

## 2024-08-11 RX ADMIN — LIDOCAINE 1 PATCH: 4 PATCH TOPICAL at 12:38

## 2024-08-11 RX ADMIN — METOPROLOL TARTRATE 12.5 MG: 25 TABLET, FILM COATED ORAL at 08:27

## 2024-08-11 RX ADMIN — HEPARIN SODIUM 5000 UNITS: 5000 INJECTION INTRAVENOUS; SUBCUTANEOUS at 12:37

## 2024-08-11 RX ADMIN — POLYETHYLENE GLYCOL 3350 17 G: 17 POWDER, FOR SOLUTION ORAL at 21:32

## 2024-08-11 RX ADMIN — TRAMADOL HYDROCHLORIDE 25 MG: 50 TABLET, COATED ORAL at 00:18

## 2024-08-11 RX ADMIN — ONDANSETRON 4 MG: 2 INJECTION INTRAMUSCULAR; INTRAVENOUS at 06:09

## 2024-08-11 RX ADMIN — SENNOSIDES AND DOCUSATE SODIUM 2 TABLET: 50; 8.6 TABLET ORAL at 21:31

## 2024-08-11 ASSESSMENT — COGNITIVE AND FUNCTIONAL STATUS - GENERAL
MOVING TO AND FROM BED TO CHAIR: A LITTLE
TOILETING: A LITTLE
DRESSING REGULAR LOWER BODY CLOTHING: A LITTLE
CLIMB 3 TO 5 STEPS WITH RAILING: A LOT
DRESSING REGULAR UPPER BODY CLOTHING: A LITTLE
MOBILITY SCORE: 18
DRESSING REGULAR UPPER BODY CLOTHING: A LITTLE
TURNING FROM BACK TO SIDE WHILE IN FLAT BAD: A LITTLE
CLIMB 3 TO 5 STEPS WITH RAILING: A LOT
TURNING FROM BACK TO SIDE WHILE IN FLAT BAD: A LITTLE
MOBILITY SCORE: 18
STANDING UP FROM CHAIR USING ARMS: A LITTLE
DAILY ACTIVITIY SCORE: 20
DRESSING REGULAR LOWER BODY CLOTHING: A LITTLE
TOILETING: A LITTLE
WALKING IN HOSPITAL ROOM: A LITTLE
MOVING TO AND FROM BED TO CHAIR: A LITTLE
HELP NEEDED FOR BATHING: A LITTLE
WALKING IN HOSPITAL ROOM: A LITTLE
HELP NEEDED FOR BATHING: A LITTLE
DAILY ACTIVITIY SCORE: 20
STANDING UP FROM CHAIR USING ARMS: A LITTLE

## 2024-08-11 ASSESSMENT — PAIN SCALES - GENERAL
PAINLEVEL_OUTOF10: 8
PAINLEVEL_OUTOF10: 7
PAINLEVEL_OUTOF10: 8
PAINLEVEL_OUTOF10: 0 - NO PAIN

## 2024-08-11 ASSESSMENT — PAIN DESCRIPTION - DESCRIPTORS: DESCRIPTORS: DISCOMFORT

## 2024-08-11 ASSESSMENT — PAIN - FUNCTIONAL ASSESSMENT
PAIN_FUNCTIONAL_ASSESSMENT: 0-10

## 2024-08-11 ASSESSMENT — PAIN DESCRIPTION - LOCATION
LOCATION: STERNUM
LOCATION: BACK

## 2024-08-11 NOTE — PROGRESS NOTES
"CARDIAC SURGERY DAILY PROGRESS NOTE      Nevaeh Fair is a 78 y.o. with PMH of SAH, anxiety on chronic benzos, chronic pain with multiple opioid dispenses in the past year per [facility], tremors, ex smoker, CAD, HTN, HLD, R ICA stenosis, GERD and DMII who was having worsening exertional angina and was found to have MVD on [facility].     Operative Procedures Sukhdev gonzalez on 8/9/24  Off Pump Coronary Artery Bypass Graft x 2; LIMA-LAD; Vein-OM2  39733 - NV CORONARY ARTERY BYP W/VEIN & ARTERY GRAFT 2 VEIN  1.  Off-pump CABG x 3 with skeletonized LIMA to LAD, right saphenous vein as a Y graft to ramus and OM.  2.  Endoscopic vein harvest.  3.  Sternal plating.    CTICU course   Transferred to LT3 on 8/10/24  =========================    Interval History:   No issues overnight     SUBJECTIVE:  Feeling anxious and afraid , unable to sleep     Objective   /69 (BP Location: Right arm, Patient Position: Sitting)   Pulse 85   Temp 36.2 °C (97.2 °F) (Temporal)   Resp 17   Ht 1.651 m (5' 5\")   Wt 72 kg (158 lb 11.7 oz)   SpO2 94%   BMI 26.41 kg/m²   0-10 (Numeric) Pain Score: 8   3 Day Weight Change: Unable to Calculate    Intake and Output    Intake/Output Summary (Last 24 hours) at 8/11/2024 1812  Last data filed at 8/11/2024 1718  Gross per 24 hour   Intake 555 ml   Output 2000 ml   Net -1445 ml       Physical Exam  Physical Exam  Vitals and nursing note reviewed.   Constitutional:       Appearance: Normal appearance.      Comments: Cooperative    Eyes:      Conjunctiva/sclera: Conjunctivae normal.   Neck:      Comments: Trachea midline   Cardiovascular:      Rate and Rhythm: Normal rate and regular rhythm.      Pulses: Normal pulses.      Heart sounds: Normal heart sounds.      Comments: NSR   Pacer wires VVI at 40   Pulmonary:      Effort: Pulmonary effort is normal.      Comments: Diminished bilaterally at baseline    2 LN NC   Sternum stable   Abdominal:      Palpations: Abdomen is soft.      Comments: Obese abd, " Hypoactive BS   Awaiting Post op BM    Genitourinary:     Comments: Voids independently   Musculoskeletal:         General: Normal range of motion.      Comments: Deconditioned    Skin:     General: Skin is warm and dry.      Capillary Refill: Capillary refill takes less than 2 seconds.      Comments: MSI DEVON , well approx. No S/sx of infection. No erythema    Neurological:      General: No focal deficit present.      Mental Status: She is alert and oriented to person, place, and time.   Psychiatric:         Mood and Affect: Mood normal.         Behavior: Behavior normal.         Judgment: Judgment normal.      Comments: Anxious          Medications  Scheduled medications  acetaminophen, 650 mg, oral, q6h  aspirin, 81 mg, oral, Daily  atorvastatin, 80 mg, oral, Nightly  cyclobenzaprine, 5 mg, oral, TID  heparin (porcine), 5,000 Units, subcutaneous, q8h  lidocaine, 1 patch, transdermal, q24h  metoprolol tartrate, 12.5 mg, oral, BID  pantoprazole, 40 mg, oral, Daily before breakfast  polyethylene glycol, 17 g, oral, BID  QUEtiapine, 25 mg, oral, Nightly  sennosides-docusate sodium, 2 tablet, oral, BID    Continuous medications   PRN medications  PRN medications: naloxone, ondansetron, oxygen, traMADol, traMADol    Labs  Results for orders placed or performed during the hospital encounter of 08/09/24 (from the past 24 hour(s))   POCT GLUCOSE   Result Value Ref Range    POCT Glucose 169 (H) 74 - 99 mg/dL   Magnesium   Result Value Ref Range    Magnesium 2.08 1.60 - 2.40 mg/dL   CBC   Result Value Ref Range    WBC 7.1 4.4 - 11.3 x10*3/uL    nRBC 0.0 0.0 - 0.0 /100 WBCs    RBC 2.71 (L) 4.00 - 5.20 x10*6/uL    Hemoglobin 8.9 (L) 12.0 - 16.0 g/dL    Hematocrit 27.3 (L) 36.0 - 46.0 %     (H) 80 - 100 fL    MCH 32.8 26.0 - 34.0 pg    MCHC 32.6 32.0 - 36.0 g/dL    RDW 14.3 11.5 - 14.5 %    Platelets 124 (L) 150 - 450 x10*3/uL   Renal Function Panel   Result Value Ref Range    Glucose 143 (H) 74 - 99 mg/dL    Sodium 139  136 - 145 mmol/L    Potassium 3.8 3.5 - 5.3 mmol/L    Chloride 103 98 - 107 mmol/L    Bicarbonate 27 21 - 32 mmol/L    Anion Gap 13 10 - 20 mmol/L    Urea Nitrogen 12 6 - 23 mg/dL    Creatinine 0.84 0.50 - 1.05 mg/dL    eGFR 71 >60 mL/min/1.73m*2    Calcium 9.1 8.6 - 10.6 mg/dL    Phosphorus 3.1 2.5 - 4.9 mg/dL    Albumin 4.0 3.4 - 5.0 g/dL   POCT GLUCOSE   Result Value Ref Range    POCT Glucose 159 (H) 74 - 99 mg/dL               IMAGING/ DIAGNOSTIC TESTING:  I have personally reviewed the following test result(s):    XR CHEST 1 VIEW; 8/11/2024 4:01 am      INDICATION:  Signs/Symptoms:s/p cardiac surgery.      COMPARISON:  Radiograph dated 08/10/2024      ACCESSION NUMBER(S):  [id number]      ORDERING CLINICIAN:  KATI CARTAGENA      FINDINGS:  Mediastinal drain is in stable position.      The cardiac silhouette size is unchanged. Status post median  sternotomy.      Unchanged trace/small biapical pneumothorax, left more than right.  Mild bibasilar atelectasis. Small left pleural effusion.      No acute osseous change.      IMPRESSION:  1. Unchanged small/trace bilateral apical pneumothorax.  2. Bibasilar atelectasis. Small left pleural effusion.              Signed by: Chaya Villalta 8/11/2024 7:05 AM  Dictation workstation:   [id number]      IMPRESSION & PLAN:  POD # 2 s/p off pump CABG x 3, closed with sternal plates on 8/9 with Bigg Tellez.   - Increase activity/ ambulation; PT/OT  - Encourage IS, C/DB; respiratory therapy; wean O2 as dre   - Cardiac rehab referral   - Continue cardiac meds: ASA, BB, statin   - continue plavix x1 month for off-pump CABG, Imdur x1 month for radial artery graft   - Pain and anticonstipation meds  - 2v CXR ordered for 8/11/24  - Remove epicardial wires prior to discharge   - Tele until discharge  - Optimize nutrition and electrolytes    Rhythm  - Tele: NSR 71- 92  - Metoprolol 25 mg BID   - wires capped   - Adjust medications as tolerated    Acute Blood Loss Anemia   Recent  Labs     24  0728 08/10/24  0110 24  1317 24  1136 24  1517 23  1148 22  0908   HGB 8.9* 9.4* 9.3* 14.1 13.3 13.7 12.5   HCT 27.3* 26.9* 26.4* 42.4 41.1 42.8 38.2   - MV, PO Iron x1mo  - Daily labs, transfuse as indicated    Thrombocytopenia  Recent Labs     24  0728 08/10/24  0110 24  1317 24  1136 24  1517 23  1148 22  0908   * 147* 150 210 173 209 208   - Etiology likely postop/CPB related  - Continue to trend with daily CBCs    Volume/Electrolyte Status: Preop wt Weight: 68.3 kg (150 lb 9.2 oz)   Vitals:    08/10/24 0600   Weight: 72 kg (158 lb 11.7 oz)     - Weight: 72 Kg   - Adjust diuresis as needed for postop cardiac surgery hypervolemia  - Replete electrolytes for hypokalemia/hypomagnesemia/hypophosphatemia as needed k  replaced   - Daily weights and strict I&Os  - Daily RFP while admitted    Leukocytosis  Recent Labs     24  0728 08/10/24  0110 24  1317 24  1136 24  1517 23  1148 22  0908   WBC 7.1 9.8 12.3* 7.8 7.0 7.8 4.3*     Temp (36hrs), Av.1 °C (97 °F), Min:35.8 °C (96.4 °F), Max:36.4 °C (97.5 °F)     - aggressive pulmonary hygiene  - monitor for s/s infection  - likely atelectasis/ postoperative in etiology  - daily CBC to follow    Hypertension  Systolic (24hrs), Av , Min:101 , Max:115    - continue BB  -Consider ACEis/ ARBS once bp stable    Hyperlipidemia:   Lab Results   Component Value Date    CHOL 153 2022    HDL 49.4 2022    VLDL 37 2022    TRIG 185 (H) 2022    NHDL 129 2021   -continue statin  -follow up lipid panel with PCP/ cardio as appropriate    R ICA stenosis 50-69%.  - needs to Follow up as outpatient with vascular surgery   - Neuro vascular assessment    - Permissive HTN SBP 130s     GERD   - Cont PPI   - Avoid taking pills on an empty stomach  - Eat small frequent meals  - eat sitting up in the chair   - avoid acidic foods    Hx of  anxiety on chronic benzos, chronic pain on opioids tremors, and SAH.    Serial neuro and pain assessments   - Scheduled Tylenol   - Oxy stop d/t nausea   - Tramadol 50 and 100 mg every 6 hrs   - Lidoderm patches x3 days   - PT/OT Consult, OOB to chair as tolerated, chair position if not tolerated   Sleep/wake cycle hygiene   - Resumed home Xanax and Seroquel 8/11        VTE Prophylaxis: SCDs/TEDs, ambulation, SQ heparin  Code Status: Full Code    Dispo  - PT/OT recs Home   - Would benefit from homecare RN for cardiac surgery carepath  - Anticipate discharge Tuesday  days, pending diuresis  - Will continue to assess discharge needs      GUDELIA Gary-CNP  Cardiac Surgery DA  [facility]  Team Phone [phone]    8/11/2024  6:12 PM

## 2024-08-11 NOTE — CARE PLAN
Problem: Skin  Goal: Decreased wound size/increased tissue granulation at next dressing change  Outcome: Progressing  Goal: Participates in plan/prevention/treatment measures  Outcome: Progressing  Goal: Prevent/manage excess moisture  Outcome: Progressing  Goal: Prevent/minimize sheer/friction injuries  Outcome: Progressing  Goal: Promote/optimize nutrition  Outcome: Progressing  Goal: Promote skin healing  Outcome: Progressing       The clinical goals for the shift include remain HDS throughout the shift.      08/11/24 at 3:39 AM - KEIRA BRIONES RN

## 2024-08-12 ENCOUNTER — APPOINTMENT (OUTPATIENT)
Dept: RADIOLOGY | Facility: HOSPITAL | Age: 79
DRG: 236 | End: 2024-08-12
Payer: MEDICARE

## 2024-08-12 LAB
ALBUMIN SERPL BCP-MCNC: 3.9 G/DL (ref 3.4–5)
ANION GAP SERPL CALC-SCNC: 16 MMOL/L (ref 10–20)
BUN SERPL-MCNC: 17 MG/DL (ref 6–23)
CALCIUM SERPL-MCNC: 9 MG/DL (ref 8.6–10.6)
CHLORIDE SERPL-SCNC: 100 MMOL/L (ref 98–107)
CO2 SERPL-SCNC: 25 MMOL/L (ref 21–32)
CREAT SERPL-MCNC: 0.86 MG/DL (ref 0.5–1.05)
EGFRCR SERPLBLD CKD-EPI 2021: 69 ML/MIN/1.73M*2
ERYTHROCYTE [DISTWIDTH] IN BLOOD BY AUTOMATED COUNT: 14.1 % (ref 11.5–14.5)
GLUCOSE BLD MANUAL STRIP-MCNC: 128 MG/DL (ref 74–99)
GLUCOSE BLD MANUAL STRIP-MCNC: 143 MG/DL (ref 74–99)
GLUCOSE SERPL-MCNC: 152 MG/DL (ref 74–99)
HCT VFR BLD AUTO: 27.7 % (ref 36–46)
HGB BLD-MCNC: 9.2 G/DL (ref 12–16)
MAGNESIUM SERPL-MCNC: 1.89 MG/DL (ref 1.6–2.4)
MCH RBC QN AUTO: 33.3 PG (ref 26–34)
MCHC RBC AUTO-ENTMCNC: 33.2 G/DL (ref 32–36)
MCV RBC AUTO: 100 FL (ref 80–100)
NRBC BLD-RTO: 0 /100 WBCS (ref 0–0)
PHOSPHATE SERPL-MCNC: 3.4 MG/DL (ref 2.5–4.9)
PLATELET # BLD AUTO: 151 X10*3/UL (ref 150–450)
POTASSIUM SERPL-SCNC: 3.9 MMOL/L (ref 3.5–5.3)
RBC # BLD AUTO: 2.76 X10*6/UL (ref 4–5.2)
SODIUM SERPL-SCNC: 137 MMOL/L (ref 136–145)
WBC # BLD AUTO: 7.2 X10*3/UL (ref 4.4–11.3)

## 2024-08-12 PROCEDURE — 2500000004 HC RX 250 GENERAL PHARMACY W/ HCPCS (ALT 636 FOR OP/ED): Performed by: NURSE PRACTITIONER

## 2024-08-12 PROCEDURE — 36415 COLL VENOUS BLD VENIPUNCTURE: CPT | Performed by: NURSE PRACTITIONER

## 2024-08-12 PROCEDURE — 80069 RENAL FUNCTION PANEL: CPT | Performed by: NURSE PRACTITIONER

## 2024-08-12 PROCEDURE — 83735 ASSAY OF MAGNESIUM: CPT | Performed by: NURSE PRACTITIONER

## 2024-08-12 PROCEDURE — 85027 COMPLETE CBC AUTOMATED: CPT | Performed by: NURSE PRACTITIONER

## 2024-08-12 PROCEDURE — 2500000002 HC RX 250 W HCPCS SELF ADMINISTERED DRUGS (ALT 637 FOR MEDICARE OP, ALT 636 FOR OP/ED): Performed by: NURSE PRACTITIONER

## 2024-08-12 PROCEDURE — 2500000001 HC RX 250 WO HCPCS SELF ADMINISTERED DRUGS (ALT 637 FOR MEDICARE OP): Performed by: NURSE PRACTITIONER

## 2024-08-12 PROCEDURE — 1200000002 HC GENERAL ROOM WITH TELEMETRY DAILY

## 2024-08-12 PROCEDURE — 71046 X-RAY EXAM CHEST 2 VIEWS: CPT | Performed by: RADIOLOGY

## 2024-08-12 PROCEDURE — 82947 ASSAY GLUCOSE BLOOD QUANT: CPT

## 2024-08-12 PROCEDURE — 71046 X-RAY EXAM CHEST 2 VIEWS: CPT

## 2024-08-12 RX ORDER — POTASSIUM CHLORIDE 20 MEQ/1
40 TABLET, EXTENDED RELEASE ORAL ONCE
Status: DISCONTINUED | OUTPATIENT
Start: 2024-08-12 | End: 2024-08-12

## 2024-08-12 RX ORDER — POTASSIUM CHLORIDE 1.5 G/1.58G
40 POWDER, FOR SOLUTION ORAL ONCE
Status: COMPLETED | OUTPATIENT
Start: 2024-08-12 | End: 2024-08-12

## 2024-08-12 RX ORDER — BISACODYL 10 MG/1
10 SUPPOSITORY RECTAL ONCE
Status: DISCONTINUED | OUTPATIENT
Start: 2024-08-12 | End: 2024-08-13 | Stop reason: HOSPADM

## 2024-08-12 RX ORDER — LANOLIN ALCOHOL/MO/W.PET/CERES
800 CREAM (GRAM) TOPICAL ONCE
Status: COMPLETED | OUTPATIENT
Start: 2024-08-12 | End: 2024-08-12

## 2024-08-12 RX ORDER — ADHESIVE BANDAGE
30 BANDAGE TOPICAL ONCE
Status: DISCONTINUED | OUTPATIENT
Start: 2024-08-12 | End: 2024-08-13 | Stop reason: HOSPADM

## 2024-08-12 RX ADMIN — POLYETHYLENE GLYCOL 3350 17 G: 17 POWDER, FOR SOLUTION ORAL at 09:32

## 2024-08-12 RX ADMIN — TRAMADOL HYDROCHLORIDE 100 MG: 50 TABLET, COATED ORAL at 09:32

## 2024-08-12 RX ADMIN — POTASSIUM CHLORIDE 40 MEQ: 1.5 POWDER, FOR SOLUTION ORAL at 15:12

## 2024-08-12 RX ADMIN — CYCLOBENZAPRINE 5 MG: 10 TABLET, FILM COATED ORAL at 09:33

## 2024-08-12 RX ADMIN — METOPROLOL TARTRATE 25 MG: 25 TABLET, FILM COATED ORAL at 09:33

## 2024-08-12 RX ADMIN — METOPROLOL TARTRATE 25 MG: 25 TABLET, FILM COATED ORAL at 20:19

## 2024-08-12 RX ADMIN — MAGNESIUM OXIDE TAB 400 MG (241.3 MG ELEMENTAL MG) 800 MG: 400 (241.3 MG) TAB at 15:12

## 2024-08-12 RX ADMIN — ACETAMINOPHEN 650 MG: 325 TABLET ORAL at 15:11

## 2024-08-12 RX ADMIN — SENNOSIDES AND DOCUSATE SODIUM 2 TABLET: 50; 8.6 TABLET ORAL at 09:32

## 2024-08-12 RX ADMIN — PANTOPRAZOLE SODIUM 40 MG: 40 TABLET, DELAYED RELEASE ORAL at 06:00

## 2024-08-12 RX ADMIN — QUETIAPINE FUMARATE 25 MG: 25 TABLET ORAL at 20:20

## 2024-08-12 RX ADMIN — HEPARIN SODIUM 5000 UNITS: 5000 INJECTION INTRAVENOUS; SUBCUTANEOUS at 20:20

## 2024-08-12 RX ADMIN — HEPARIN SODIUM 5000 UNITS: 5000 INJECTION INTRAVENOUS; SUBCUTANEOUS at 04:04

## 2024-08-12 RX ADMIN — ACETAMINOPHEN 650 MG: 325 TABLET ORAL at 06:00

## 2024-08-12 RX ADMIN — HEPARIN SODIUM 5000 UNITS: 5000 INJECTION INTRAVENOUS; SUBCUTANEOUS at 15:11

## 2024-08-12 RX ADMIN — CYCLOBENZAPRINE 5 MG: 10 TABLET, FILM COATED ORAL at 20:20

## 2024-08-12 RX ADMIN — ATORVASTATIN CALCIUM 80 MG: 80 TABLET, FILM COATED ORAL at 20:19

## 2024-08-12 RX ADMIN — ASPIRIN 81 MG: 81 TABLET, COATED ORAL at 09:33

## 2024-08-12 ASSESSMENT — COGNITIVE AND FUNCTIONAL STATUS - GENERAL
CLIMB 3 TO 5 STEPS WITH RAILING: A LITTLE
HELP NEEDED FOR BATHING: A LITTLE
DAILY ACTIVITIY SCORE: 23
MOBILITY SCORE: 23

## 2024-08-12 ASSESSMENT — PAIN SCALES - GENERAL
PAINLEVEL_OUTOF10: 4
PAINLEVEL_OUTOF10: 5 - MODERATE PAIN
PAINLEVEL_OUTOF10: 7

## 2024-08-12 ASSESSMENT — PAIN - FUNCTIONAL ASSESSMENT
PAIN_FUNCTIONAL_ASSESSMENT: 0-10
PAIN_FUNCTIONAL_ASSESSMENT: 0-10

## 2024-08-12 ASSESSMENT — PAIN DESCRIPTION - LOCATION: LOCATION: CHEST

## 2024-08-12 ASSESSMENT — PAIN DESCRIPTION - ORIENTATION: ORIENTATION: MID

## 2024-08-12 NOTE — PROGRESS NOTES
Occupational Therapy                 Therapy Communication Note    Patient Name: Nevaeh Fair  MRN: [id number]  Today's Date: 8/12/2024     Discipline: Occupational Therapy    Missed Visit Reason: Missed Visit Reason:  (Pt off the floor for testing, will follow up as schedule permits)    Missed Time: Attempt

## 2024-08-12 NOTE — CARE PLAN
Problem: Skin  Goal: Decreased wound size/increased tissue granulation at next dressing change  Outcome: Progressing  Goal: Participates in plan/prevention/treatment measures  Outcome: Progressing  Goal: Prevent/manage excess moisture  Outcome: Progressing  Goal: Prevent/minimize sheer/friction injuries  Outcome: Progressing  Goal: Promote/optimize nutrition  Outcome: Progressing  Goal: Promote skin healing  Outcome: Progressing       The clinical goals for the shift include pt will report adequate pain management throughout the shift.      08/11/24 at 10:53 PM - KEIRA BRIONES RN

## 2024-08-12 NOTE — CARE PLAN
Problem: Skin  Goal: Decreased wound size/increased tissue granulation at next dressing change  Outcome: Progressing  Goal: Participates in plan/prevention/treatment measures  Outcome: Progressing  Goal: Prevent/manage excess moisture  Outcome: Progressing  Goal: Prevent/minimize sheer/friction injuries  Outcome: Progressing  Goal: Promote/optimize nutrition  Outcome: Progressing  Goal: Promote skin healing  Outcome: Progressing     Problem: Pain  Goal: Takes deep breaths with improved pain control throughout the shift  Outcome: Progressing  Goal: Turns in bed with improved pain control throughout the shift  Outcome: Progressing  Goal: Walks with improved pain control throughout the shift  Outcome: Progressing  Goal: Performs ADL's with improved pain control throughout shift  Outcome: Progressing  Goal: Participates in PT with improved pain control throughout the shift  Outcome: Progressing  Goal: Free from opioid side effects throughout the shift  Outcome: Progressing  Goal: Free from acute confusion related to pain meds throughout the shift  Outcome: Progressing      The clinical goals for the shift include patient will remain hemodynamically stable throughtout the shift

## 2024-08-12 NOTE — PROGRESS NOTES
"CARDIAC SURGERY DAILY PROGRESS NOTE      Nevaeh Fair is a 78 y.o. with PMH of SAH, anxiety on chronic benzos, chronic pain with multiple opioid dispenses in the past year per OAR[facility], tremors, ex smoker, CAD, HTN, HLD, R ICA stenosis, GERD and DMII who was having worsening exertional angina and was found to have MVD on C.     Operative Procedures Sukhdev Bigg gonzalez on 8/9/24  Off Pump Coronary Artery Bypass Graft x 2; LIMA-LAD; Vein-OM2  75745 - [place] CORONARY ARTERY BYP W/VEIN & ARTERY GRAFT 2 VEIN  1.  Off-pump CABG x 3 with skeletonized LIMA to LAD, right saphenous vein as a Y graft to ramus and OM.  2.  Endoscopic vein harvest.  3.  Sternal plating.    CTICU course   Transferred to LT3 on 8/10/24  =========================    Interval History:   No issues overnight     SUBJECTIVE:  Feeling more rested     Objective   /76   Pulse 75   Temp 36.5 °C (97.7 °F)   Resp 18   Ht 1.651 m (5' 5\")   Wt 68.8 kg (151 lb 9.6 oz)   SpO2 97%   BMI 25.23 kg/m²   0-10 (Numeric) Pain Score: 5 - Moderate pain   3 Day Weight Change: 0.155 kg (5.5 oz) per day    Intake and Output    Intake/Output Summary (Last 24 hours) at 8/12/2024 1223  Last data filed at 8/11/2024 1718  Gross per 24 hour   Intake 480 ml   Output 350 ml   Net 130 ml       Physical Exam  Physical Exam  Vitals and nursing note reviewed.   Constitutional:       Appearance: Normal appearance.      Comments: Cooperative    Eyes:      Conjunctiva/sclera: Conjunctivae normal.   Neck:      Comments: Trachea midline   Cardiovascular:      Rate and Rhythm: Normal rate and regular rhythm.      Pulses: Normal pulses.      Heart sounds: Normal heart sounds.      Comments: NSR   Pacer wires VVI at 40   Pulmonary:      Effort: Pulmonary effort is normal.      Comments: Diminished bilaterally at baseline    2 LN NC   Sternum stable   Abdominal:      Palpations: Abdomen is soft.      Comments: Obese abd, Hypoactive BS   Awaiting Post op BM    Genitourinary:     Comments: " Voids independently   Musculoskeletal:         General: Normal range of motion.      Comments: Deconditioned    Skin:     General: Skin is warm and dry.      Capillary Refill: Capillary refill takes less than 2 seconds.      Comments: MSI DEVON , well approx. No S/sx of infection. No erythema    Neurological:      General: No focal deficit present.      Mental Status: She is alert and oriented to person, place, and time.   Psychiatric:         Mood and Affect: Mood normal.         Behavior: Behavior normal.         Judgment: Judgment normal.      Comments: Anxious      Medications  Scheduled medications  acetaminophen, 650 mg, oral, q6h  aspirin, 81 mg, oral, Daily  atorvastatin, 80 mg, oral, Nightly  bisacodyl, 10 mg, rectal, Once  cyclobenzaprine, 5 mg, oral, TID  heparin (porcine), 5,000 Units, subcutaneous, q8h  lidocaine, 1 patch, transdermal, q24h  magnesium hydroxide, 30 mL, oral, Once  metoprolol tartrate, 25 mg, oral, BID  pantoprazole, 40 mg, oral, Daily before breakfast  polyethylene glycol, 17 g, oral, BID  QUEtiapine, 25 mg, oral, Nightly  sennosides-docusate sodium, 2 tablet, oral, BID    Continuous medications   PRN medications  PRN medications: ALPRAZolam, naloxone, ondansetron, oxygen, traMADol, traMADol    Labs  Results for orders placed or performed during the hospital encounter of 08/09/24 (from the past 24 hour(s))   Magnesium   Result Value Ref Range    Magnesium 1.89 1.60 - 2.40 mg/dL   Renal Function Panel   Result Value Ref Range    Glucose 152 (H) 74 - 99 mg/dL    Sodium 137 136 - 145 mmol/L    Potassium 3.9 3.5 - 5.3 mmol/L    Chloride 100 98 - 107 mmol/L    Bicarbonate 25 21 - 32 mmol/L    Anion Gap 16 10 - 20 mmol/L    Urea Nitrogen 17 6 - 23 mg/dL    Creatinine 0.86 0.50 - 1.05 mg/dL    eGFR 69 >60 mL/min/1.73m*2    Calcium 9.0 8.6 - 10.6 mg/dL    Phosphorus 3.4 2.5 - 4.9 mg/dL    Albumin 3.9 3.4 - 5.0 g/dL   CBC   Result Value Ref Range    WBC 7.2 4.4 - 11.3 x10*3/uL    nRBC 0.0 0.0 -  0.0 /100 WBCs    RBC 2.76 (L) 4.00 - 5.20 x10*6/uL    Hemoglobin 9.2 (L) 12.0 - 16.0 g/dL    Hematocrit 27.7 (L) 36.0 - 46.0 %     80 - 100 fL    MCH 33.3 26.0 - 34.0 pg    MCHC 33.2 32.0 - 36.0 g/dL    RDW 14.1 11.5 - 14.5 %    Platelets 151 150 - 450 x10*3/uL   POCT GLUCOSE   Result Value Ref Range    POCT Glucose 128 (H) 74 - 99 mg/dL           IMAGING/ DIAGNOSTIC TESTING:  I have personally reviewed the following test result(s):      XR CHEST 1 VIEW; 8/11/2024 4:01 am      FINDINGS:  Mediastinal drain is in stable position.      The cardiac silhouette size is unchanged. Status post median  sternotomy.      Unchanged trace/small biapical pneumothorax, left more than right.  Mild bibasilar atelectasis. Small left pleural effusion.      No acute osseous change.      IMPRESSION:  1. Unchanged small/trace bilateral apical pneumothorax.  2. Bibasilar atelectasis. Small left pleural effusion.      Signed by: Chyaa Villalta 8/11/2024 7:05 AM  Dictation workstation:   [id number]      IMPRESSION & PLAN:  POD # 3 s/p off pump CABG x 3, closed with sternal plates on 8/9 with Bigg Tellez.   - Increase activity/ ambulation; PT/OT  - Encourage IS, C/DB; respiratory therapy; wean O2 as dre   - Cardiac rehab referral   - Continue cardiac meds: ASA, BB, statin   - continue plavix x1 month for off-pump CABG, Imdur x1 month for radial artery graft   - Pain and anticonstipation meds  - 2v CXR completed  8/11/24  - Remove epicardial wires prior to discharge   - Tele until discharge  - Optimize nutrition and electrolytes    Rhythm  - Tele: NSR 71- 92  - Metoprolol 25 mg BID   - wires capped   - Adjust medications as tolerated    Acute Blood Loss Anemia   Recent Labs     08/12/24  0721 08/11/24  0728 08/10/24  0110 08/09/24  1317 08/06/24  1136 06/18/24  1517 02/27/23  1148   HGB 9.2* 8.9* 9.4* 9.3* 14.1 13.3 13.7   HCT 27.7* 27.3* 26.9* 26.4* 42.4 41.1 42.8   - MV, PO Iron x1mo  - Daily labs, transfuse as  indicated    Thrombocytopenia  Recent Labs     24  0721 24  0728 08/10/24  0110 24  1317 24  1136 24  1517 23  1148    124* 147* 150 210 173 209   - Etiology likely postop/CPB related  - Continue to trend with daily CBCs    Volume/Electrolyte Status: Preop wt Weight: 68.3 kg (150 lb 9.2 oz)   Vitals:    24 0400   Weight: 68.8 kg (151 lb 9.6 oz)   - Weight: 68.8, 72 Kg   - lasix 20 mg PO daily   - Adjust diuresis as needed for postop cardiac surgery hypervolemia  - Replete electrolytes for hypokalemia/hypomagnesemia/hypophosphatemia as needed k  replaced , K and mag replaced   - Daily weights and strict I&Os  - Daily RFP while admitted    Leukocytosis  Recent Labs     24  0721 24  0728 08/10/24  0110 24  1317 24  1136 24  1517 23  1148   WBC 7.2 7.1 9.8 12.3* 7.8 7.0 7.8     Temp (36hrs), Av.2 °C (97.2 °F), Min:35.9 °C (96.6 °F), Max:36.6 °C (97.9 °F)   - aggressive pulmonary hygiene  - monitor for s/s infection  - likely atelectasis/ postoperative in etiology  - daily CBC to follow    Hypertension  Systolic (24hrs), Av , Min:103 , Max:120    - continue BB  -Consider ACEis/ ARBS once bp stable once Bp is more stable     Hyperlipidemia:   Lab Results   Component Value Date    CHOL 153 2022    HDL 49.4 2022    VLDL 37 2022    TRIG 185 (H) 2022    NHDL 129 2021   -continue statin  -follow up lipid panel with PCP/ cardio as appropriate    R ICA stenosis 50-69%.  - needs to Follow up as outpatient with vascular surgery   - Neuro vascular assessment    - Permissive HTN SBP 130s     GERD   - Cont PPI   - Avoid taking pills on an empty stomach  - Eat small frequent meals  - eat sitting up in the chair   - avoid acidic foods    Hx of anxiety on chronic benzos, chronic pain on opioids tremors, and SAH.    Serial neuro and pain assessments   - Scheduled Tylenol   - Oxy stop d/t nausea   - Tramadol  50 and 100 mg every 6 hrs   - Lidoderm patches x3 days   - PT/OT Consult, OOB to chair as tolerated, chair position if not tolerated   Sleep/wake cycle hygiene   - Resumed home Xanax and Seroquel 8/11    VTE Prophylaxis: SCDs/TEDs, ambulation, SQ heparin  Code Status: Full Code    Dispo  - PT/OT recs Home   - Would benefit from homecare RN for cardiac surgery carepath  - Anticipate discharge Tuesday  days, pending diuresis  - Will continue to assess discharge needs      GUDELIA Gary-CNP  Cardiac Surgery DA  [facility]  Team Phone [phone]    8/12/2024  12:23 PM

## 2024-08-13 ENCOUNTER — DOCUMENTATION (OUTPATIENT)
Dept: HOME HEALTH SERVICES | Facility: HOME HEALTH | Age: 79
End: 2024-08-13
Payer: MEDICARE

## 2024-08-13 ENCOUNTER — HOME HEALTH ADMISSION (OUTPATIENT)
Dept: HOME HEALTH SERVICES | Facility: HOME HEALTH | Age: 79
End: 2024-08-13
Payer: MEDICARE

## 2024-08-13 VITALS
SYSTOLIC BLOOD PRESSURE: 96 MMHG | DIASTOLIC BLOOD PRESSURE: 62 MMHG | HEIGHT: 65 IN | TEMPERATURE: 97 F | WEIGHT: 150.3 LBS | BODY MASS INDEX: 25.04 KG/M2 | RESPIRATION RATE: 18 BRPM | OXYGEN SATURATION: 95 % | HEART RATE: 70 BPM

## 2024-08-13 DIAGNOSIS — J95.811 POSTPROCEDURAL PNEUMOTHORAX: ICD-10-CM

## 2024-08-13 LAB
ALBUMIN SERPL BCP-MCNC: 3.9 G/DL (ref 3.4–5)
ANION GAP SERPL CALC-SCNC: 13 MMOL/L (ref 10–20)
BLOOD EXPIRATION DATE: NORMAL
BUN SERPL-MCNC: 21 MG/DL (ref 6–23)
CALCIUM SERPL-MCNC: 9.2 MG/DL (ref 8.6–10.6)
CHLORIDE SERPL-SCNC: 103 MMOL/L (ref 98–107)
CO2 SERPL-SCNC: 28 MMOL/L (ref 21–32)
CREAT SERPL-MCNC: 0.77 MG/DL (ref 0.5–1.05)
DISPENSE STATUS: NORMAL
EGFRCR SERPLBLD CKD-EPI 2021: 79 ML/MIN/1.73M*2
ERYTHROCYTE [DISTWIDTH] IN BLOOD BY AUTOMATED COUNT: 14.5 % (ref 11.5–14.5)
GLUCOSE SERPL-MCNC: 99 MG/DL (ref 74–99)
HCT VFR BLD AUTO: 27.7 % (ref 36–46)
HGB BLD-MCNC: 8.9 G/DL (ref 12–16)
MAGNESIUM SERPL-MCNC: 2.09 MG/DL (ref 1.6–2.4)
MCH RBC QN AUTO: 32.6 PG (ref 26–34)
MCHC RBC AUTO-ENTMCNC: 32.1 G/DL (ref 32–36)
MCV RBC AUTO: 102 FL (ref 80–100)
NRBC BLD-RTO: 0.3 /100 WBCS (ref 0–0)
PHOSPHATE SERPL-MCNC: 3.3 MG/DL (ref 2.5–4.9)
PLATELET # BLD AUTO: 162 X10*3/UL (ref 150–450)
POTASSIUM SERPL-SCNC: 3.9 MMOL/L (ref 3.5–5.3)
PRODUCT BLOOD TYPE: 6200
PRODUCT CODE: NORMAL
RBC # BLD AUTO: 2.73 X10*6/UL (ref 4–5.2)
SODIUM SERPL-SCNC: 140 MMOL/L (ref 136–145)
UNIT ABO: NORMAL
UNIT NUMBER: NORMAL
UNIT RH: NORMAL
UNIT VOLUME: 350
WBC # BLD AUTO: 7.7 X10*3/UL (ref 4.4–11.3)
XM INTEP: NORMAL

## 2024-08-13 PROCEDURE — 83735 ASSAY OF MAGNESIUM: CPT | Performed by: NURSE PRACTITIONER

## 2024-08-13 PROCEDURE — 36415 COLL VENOUS BLD VENIPUNCTURE: CPT | Performed by: NURSE PRACTITIONER

## 2024-08-13 PROCEDURE — 2500000001 HC RX 250 WO HCPCS SELF ADMINISTERED DRUGS (ALT 637 FOR MEDICARE OP): Performed by: NURSE PRACTITIONER

## 2024-08-13 PROCEDURE — 2500000004 HC RX 250 GENERAL PHARMACY W/ HCPCS (ALT 636 FOR OP/ED): Performed by: NURSE PRACTITIONER

## 2024-08-13 PROCEDURE — 85027 COMPLETE CBC AUTOMATED: CPT | Performed by: NURSE PRACTITIONER

## 2024-08-13 PROCEDURE — 80069 RENAL FUNCTION PANEL: CPT | Performed by: NURSE PRACTITIONER

## 2024-08-13 RX ORDER — ACETAMINOPHEN 325 MG/1
650 TABLET ORAL EVERY 6 HOURS
COMMUNITY
Start: 2024-08-13 | End: 2024-08-23

## 2024-08-13 RX ORDER — ASPIRIN 81 MG/1
81 TABLET ORAL DAILY
COMMUNITY
Start: 2024-08-14 | End: 2024-09-13

## 2024-08-13 RX ORDER — FUROSEMIDE 20 MG/1
20 TABLET ORAL DAILY
Qty: 3 TABLET | Refills: 0 | Status: SHIPPED | OUTPATIENT
Start: 2024-08-13 | End: 2024-08-16

## 2024-08-13 RX ORDER — CLOPIDOGREL BISULFATE 75 MG/1
75 TABLET ORAL DAILY
Status: DISCONTINUED | OUTPATIENT
Start: 2024-08-13 | End: 2024-08-13 | Stop reason: HOSPADM

## 2024-08-13 RX ORDER — METOPROLOL TARTRATE 25 MG/1
25 TABLET, FILM COATED ORAL 2 TIMES DAILY
Qty: 60 TABLET | Refills: 0 | Status: SHIPPED | OUTPATIENT
Start: 2024-08-13 | End: 2024-09-12

## 2024-08-13 RX ORDER — AMOXICILLIN 250 MG
2 CAPSULE ORAL 2 TIMES DAILY
COMMUNITY
Start: 2024-08-13 | End: 2024-08-18

## 2024-08-13 RX ORDER — POTASSIUM CHLORIDE 1.5 G/1.58G
20 POWDER, FOR SOLUTION ORAL ONCE
Status: COMPLETED | OUTPATIENT
Start: 2024-08-13 | End: 2024-08-13

## 2024-08-13 RX ORDER — TRAMADOL HYDROCHLORIDE 50 MG/1
50 TABLET ORAL EVERY 6 HOURS PRN
Qty: 12 TABLET | Refills: 0 | Status: SHIPPED | OUTPATIENT
Start: 2024-08-13 | End: 2024-08-16

## 2024-08-13 RX ORDER — CLOPIDOGREL BISULFATE 75 MG/1
75 TABLET ORAL DAILY
Qty: 30 TABLET | Refills: 11 | Status: SHIPPED | OUTPATIENT
Start: 2024-08-13 | End: 2025-08-13

## 2024-08-13 RX ORDER — FERROUS SULFATE 324(65)MG
65 TABLET, DELAYED RELEASE (ENTERIC COATED) ORAL
Refills: 0 | COMMUNITY
Start: 2024-08-13 | End: 2024-09-12

## 2024-08-13 RX ADMIN — PANTOPRAZOLE SODIUM 40 MG: 40 TABLET, DELAYED RELEASE ORAL at 09:26

## 2024-08-13 RX ADMIN — METOPROLOL TARTRATE 25 MG: 25 TABLET, FILM COATED ORAL at 09:26

## 2024-08-13 RX ADMIN — POTASSIUM CHLORIDE 20 MEQ: 1.5 POWDER, FOR SOLUTION ORAL at 10:03

## 2024-08-13 RX ADMIN — ACETAMINOPHEN 650 MG: 325 TABLET ORAL at 09:26

## 2024-08-13 RX ADMIN — TRAMADOL HYDROCHLORIDE 50 MG: 50 TABLET, COATED ORAL at 01:38

## 2024-08-13 RX ADMIN — HEPARIN SODIUM 5000 UNITS: 5000 INJECTION INTRAVENOUS; SUBCUTANEOUS at 04:43

## 2024-08-13 RX ADMIN — CYCLOBENZAPRINE 5 MG: 10 TABLET, FILM COATED ORAL at 09:26

## 2024-08-13 RX ADMIN — ASPIRIN 81 MG: 81 TABLET, COATED ORAL at 09:26

## 2024-08-13 RX ADMIN — CLOPIDOGREL BISULFATE 75 MG: 75 TABLET ORAL at 11:30

## 2024-08-13 RX ADMIN — TRAMADOL HYDROCHLORIDE 50 MG: 50 TABLET, COATED ORAL at 11:30

## 2024-08-13 ASSESSMENT — COGNITIVE AND FUNCTIONAL STATUS - GENERAL
MOBILITY SCORE: 24
DAILY ACTIVITIY SCORE: 24

## 2024-08-13 ASSESSMENT — PAIN DESCRIPTION - LOCATION
LOCATION: CHEST
LOCATION: CHEST

## 2024-08-13 ASSESSMENT — PAIN DESCRIPTION - ORIENTATION
ORIENTATION: MID
ORIENTATION: MID

## 2024-08-13 ASSESSMENT — PAIN - FUNCTIONAL ASSESSMENT
PAIN_FUNCTIONAL_ASSESSMENT: 0-10

## 2024-08-13 ASSESSMENT — PAIN SCALES - GENERAL
PAINLEVEL_OUTOF10: 6
PAINLEVEL_OUTOF10: 0 - NO PAIN
PAINLEVEL_OUTOF10: 3
PAINLEVEL_OUTOF10: 6

## 2024-08-13 NOTE — SIGNIFICANT EVENT
"EPICARDIAL WIRES CUT      1 ventricular wires cut at skin level due to surgeon preference.  Patient instructed to notify radiology of retained epicardial wires prior to any MRI procedure, and to notify Dr Bigg Tellez of any visible wires or s/s infection.    BP 96/62 (BP Location: Right arm, Patient Position: Lying) Comment: RN aware  Pulse 70   Temp 36.1 °C (97 °F) (Temporal)   Resp 18   Ht 1.651 m (5' 5\")   Wt 68.2 kg (150 lb 4.8 oz)   SpO2 95%   BMI 25.01 kg/m²     Tess Galicia, APRN-CNP  CARDIAC SURGERY DA  Team phone #[phone]  8/13/2024  10:29 AM    "

## 2024-08-13 NOTE — PROGRESS NOTES
"CARDIAC SURGERY DAILY PROGRESS NOTE      Nevaeh Fair is a 78 y.o. with PMH of SAH, anxiety on chronic benzos, chronic pain with multiple opioid dispenses in the past year per OAR[facility], tremors, ex smoker, CAD, HTN, HLD, R ICA stenosis, GERD and DMII who was having worsening exertional angina and was found to have MVD on [facility].     Operative Procedures Sukhdev gonzalez on 8/9/24  Off Pump Coronary Artery Bypass Graft x 2; LIMA-LAD; Vein-OM2  11967 - [place] CORONARY ARTERY BYP W/VEIN & ARTERY GRAFT 2 VEIN  1.  Off-pump CABG x 3 with skeletonized LIMA to LAD, right saphenous vein as a Y graft to ramus and OM.  2.  Endoscopic vein harvest.  3.  Sternal plating.    CTICU course   Transferred to LT3 on 8/10/24  =========================    Interval History:   No issues overnight     SUBJECTIVE:  Feeling more rested     Objective   /71 (BP Location: Left arm, Patient Position: Lying)   Pulse 64   Temp 36.5 °C (97.7 °F) (Temporal)   Resp 17   Ht 1.651 m (5' 5\")   Wt 68.2 kg (150 lb 4.8 oz)   SpO2 94%   BMI 25.01 kg/m²   0-10 (Numeric) Pain Score: 3   3 Day Weight Change: -1.275 kg (-2 lb 13 oz) per day    Intake and Output    Intake/Output Summary (Last 24 hours) at 8/13/2024 0931  Last data filed at 8/13/2024 0100  Gross per 24 hour   Intake --   Output 1050 ml   Net -1050 ml       Physical Exam  Physical Exam  Vitals and nursing note reviewed.   Constitutional:       Appearance: Normal appearance.      Comments: Cooperative    Eyes:      Conjunctiva/sclera: Conjunctivae normal.   Neck:      Comments: Trachea midline   Cardiovascular:      Rate and Rhythm: Normal rate and regular rhythm.      Pulses: Normal pulses.      Heart sounds: Normal heart sounds.      Comments: NSR   Pacer wires VVI at 40   Pulmonary:      Effort: Pulmonary effort is normal.      Comments: Diminished bilaterally at baseline. RA    Sternum stable   Abdominal:      Palpations: Abdomen is soft.      Comments: Obese abd, Hypoactive BS   Awaiting " Post op BM    Genitourinary:     Comments: Voids independently   Musculoskeletal:         General: Normal range of motion.      Comments: Deconditioned    Skin:     General: Skin is warm and dry.      Capillary Refill: Capillary refill takes less than 2 seconds.      Comments: MSI DEVON , well approx. No S/sx of infection. No erythema    Neurological:      General: No focal deficit present.      Mental Status: She is alert and oriented to person, place, and time.   Psychiatric:         Mood and Affect: Mood normal.         Behavior: Behavior normal.         Judgment: Judgment normal.      Comments: Anxious      Medications  Scheduled medications  acetaminophen, 650 mg, oral, q6h  aspirin, 81 mg, oral, Daily  atorvastatin, 80 mg, oral, Nightly  bisacodyl, 10 mg, rectal, Once  cyclobenzaprine, 5 mg, oral, TID  heparin (porcine), 5,000 Units, subcutaneous, q8h  magnesium hydroxide, 30 mL, oral, Once  metoprolol tartrate, 25 mg, oral, BID  pantoprazole, 40 mg, oral, Daily before breakfast  polyethylene glycol, 17 g, oral, BID  QUEtiapine, 25 mg, oral, Nightly  sennosides-docusate sodium, 2 tablet, oral, BID    Continuous medications   PRN medications  PRN medications: ALPRAZolam, naloxone, ondansetron, oxygen, traMADol    Labs  Results for orders placed or performed during the hospital encounter of 08/09/24 (from the past 24 hour(s))   POCT GLUCOSE   Result Value Ref Range    POCT Glucose 128 (H) 74 - 99 mg/dL   POCT GLUCOSE   Result Value Ref Range    POCT Glucose 143 (H) 74 - 99 mg/dL   Magnesium   Result Value Ref Range    Magnesium 2.09 1.60 - 2.40 mg/dL   CBC   Result Value Ref Range    WBC 7.7 4.4 - 11.3 x10*3/uL    nRBC 0.3 (H) 0.0 - 0.0 /100 WBCs    RBC 2.73 (L) 4.00 - 5.20 x10*6/uL    Hemoglobin 8.9 (L) 12.0 - 16.0 g/dL    Hematocrit 27.7 (L) 36.0 - 46.0 %     (H) 80 - 100 fL    MCH 32.6 26.0 - 34.0 pg    MCHC 32.1 32.0 - 36.0 g/dL    RDW 14.5 11.5 - 14.5 %    Platelets 162 150 - 450 x10*3/uL   Renal  Function Panel   Result Value Ref Range    Glucose 99 74 - 99 mg/dL    Sodium 140 136 - 145 mmol/L    Potassium 3.9 3.5 - 5.3 mmol/L    Chloride 103 98 - 107 mmol/L    Bicarbonate 28 21 - 32 mmol/L    Anion Gap 13 10 - 20 mmol/L    Urea Nitrogen 21 6 - 23 mg/dL    Creatinine 0.77 0.50 - 1.05 mg/dL    eGFR 79 >60 mL/min/1.73m*2    Calcium 9.2 8.6 - 10.6 mg/dL    Phosphorus 3.3 2.5 - 4.9 mg/dL    Albumin 3.9 3.4 - 5.0 g/dL           IMAGING/ DIAGNOSTIC TESTING:  I have personally reviewed the following test result(s):    XR CHEST 2 VIEWS    FINDINGS:  Tiny bilateral pleural effusions.      No consolidation or edema. No pneumothorax. Post cardiac surgery  changes.      IMPRESSION:  Tiny bilateral pleural effusions.      No consolidation or edema.      Signed by: German Foster 8/13/2024 6:56 AM  Dictation workstation:   [id number]      XR CHEST 1 VIEW; 8/11/2024 4:01 am      FINDINGS:  Mediastinal drain is in stable position.      The cardiac silhouette size is unchanged. Status post median  sternotomy.      Unchanged trace/small biapical pneumothorax, left more than right.  Mild bibasilar atelectasis. Small left pleural effusion.      No acute osseous change.      IMPRESSION:  1. Unchanged small/trace bilateral apical pneumothorax.  2. Bibasilar atelectasis. Small left pleural effusion.      Signed by: Chaya Villalta 8/11/2024 7:05 AM  Dictation workstation:   [id number]      IMPRESSION & PLAN:    POD # 4 s/p off pump CABG x 3, closed with sternal plates on 8/9 with Bigg Tellez.     - Increase activity/ ambulation; PT/OT  - Encourage IS, C/DB; respiratory therapy; wean O2 as dre   - Cardiac rehab referral   - Continue cardiac meds: ASA, BB, statin   - continue plavix x1 month for off-pump CABG, Imdur x1 month for radial artery graft   - Pain and anticonstipation meds  - 2v CXR completed  8/11/24  - Remove epicardial wires prior to discharge   - Tele until discharge  - Optimize nutrition and electrolytes    Post op  left apical pneumothorax   - Stable pneumothorax  - No SOB   - RA    - Pt requesting clearance to fly in the next 2-3 weeks. Recommendation provided NOT to fly in a airplane until pneumothorax is completed resolved confirmed and cleared by Dr Bigg tellez. Dr Bigg Tellez aware.     Rhythm  - Tele: NSR 65-76  - Metoprolol 25 mg BID   - wires capped   - Adjust medications as tolerated    Acute Blood Loss Anemia   Recent Labs     24  0653 24  0721 24  0728 08/10/24  0110 24  1317 24  1136 24  1517   HGB 8.9* 9.2* 8.9* 9.4* 9.3* 14.1 13.3   HCT 27.7* 27.7* 27.3* 26.9* 26.4* 42.4 41.1   - MV, PO Iron x1mo  - Daily labs, transfuse as indicated    Thrombocytopenia  Recent Labs     24  0653 24  0721 24  0728 08/10/24  0110 24  1317 24  1136 24  1517    151 124* 147* 150 210 173   - Etiology likely postop/CPB related  - Continue to trend with daily CBCs    Volume/Electrolyte Status: Preop wt Weight: 68.3 kg (150 lb 9.2 oz)   Vitals:    24 0100   Weight: 68.2 kg (150 lb 4.8 oz)   - Weight: 68.2, 68.8, 72 Kg   - lasix 20 mg PO daily   - Adjust diuresis as needed for postop cardiac surgery hypervolemia  - Replete electrolytes for hypokalemia/hypomagnesemia/hypophosphatemia as needed k  replaced , K and mag replaced , K replaced   - Daily weights and strict I&Os  - Daily RFP while admitted    Leukocytosis  Recent Labs     24  0653 24  0721 24  0728 08/10/24  0110 24  1317 24  1136 24  1517   WBC 7.7 7.2 7.1 9.8 12.3* 7.8 7.0     Temp (36hrs), Av.3 °C (97.3 °F), Min:36 °C (96.8 °F), Max:36.6 °C (97.9 °F)   - aggressive pulmonary hygiene  - monitor for s/s infection  - likely atelectasis/ postoperative in etiology  - daily CBC to follow    Hypertension  Systolic (24hrs), Av , Min:102 , Max:120   - continue BB  - Consider ACEis/ ARBS once bp stable once Bp is more stable     Hyperlipidemia:   Lab  Results   Component Value Date    CHOL 153 09/30/2022    HDL 49.4 09/30/2022    VLDL 37 09/30/2022    TRIG 185 (H) 09/30/2022    NHDL 129 04/30/2021   -continue statin  -follow up lipid panel with PCP/ cardio as appropriate    R ICA stenosis 50-69%.  - needs to Follow up as outpatient with vascular surgery   - Neuro vascular assessment    - Permissive HTN SBP 130s     GERD   - Cont PPI   - Avoid taking pills on an empty stomach  - Eat small frequent meals  - eat sitting up in the chair   - avoid acidic foods    Hx of anxiety on chronic benzos, chronic pain on opioids tremors, and SAH.    Serial neuro and pain assessments   - Scheduled Tylenol   - Oxy stop d/t nausea   - Tramadol 50 and 100 mg every 6 hrs   - Lidoderm patches x3 days   - PT/OT Consult, OOB to chair as tolerated, chair position if not tolerated   - Sleep/wake cycle hygiene   - Resumed home Xanax and Seroquel 8/11    VTE Prophylaxis: SCDs/TEDs, ambulation, SQ heparin  Code Status: Full Code    Dispo  - PT/OT recs Home   - Would benefit from homecare RN for cardiac surgery carepath  - Anticipate discharge Tuesday.  - Will continue to assess discharge needs    GUDELIA Gary-CNP  Cardiac Surgery DA  [facility]  Team Phone [phone]    8/13/2024  9:31 AM

## 2024-08-13 NOTE — PROGRESS NOTES
Occupational Therapy                 Therapy Communication Note    Patient Name: Nevaeh Fair  MRN: [id number]  Today's Date: 8/13/2024     Discipline: Occupational Therapy    Missed Visit Reason: Missed Visit Reason:  (Nurse reports that patient is to be discharged shortly.)    Missed Time: Attempt

## 2024-08-13 NOTE — DISCHARGE INSTRUCTIONS
Please make sure to get  a chest X ray before you get in an airplane  when you see Dr Bigg Tellez     Don't forget to “Keep Your Move in the Tube!!”     Please refer to the “Move in the Tube” handout.  -- Load bearing activities can be completed if you are “staying in the tube.” If you are attempting load bearing activities, let pain be your guide with when trying an activity “out of the tube”. If an activity hurts or is uncomfortable go back to doing it while you stay “in the tube”. There is no time limit to “stay in the tube”.     -- Non-load bearing activities, which are your activities of daily living, can be completed with your arms “out of the tube” as long as you remain pain free. Some activities of daily living examples are dressing, personal care, showering, washing hair, and toilet hygiene.     Don't forget to KEEP YOUR MOVE IN THE TUBE and think of a T. Joselito dinosaur!      Additional Post-Operative Instructions:  - Remember to use your Incentive spirometer 10x/hr while awake. Remember to cough and deep breath.  - No NSAIDs (common over-the-counter NSAIDs are ibuprofen/Motrin/Advil, naproxen/Naprosyn/Aleve) for 3 months after cardiac surgery; if NSAIDs needed after 3 months, clear use with cardiologist before starting.       Your home medications may have changed after surgery. Carefully compare this list with your prescription bottles at home and set aside any medications you are told to not take so you do not confuse them. Do not dispose of any medications until your follow-up, since your doctors may restart some at your follow-up appointments. It is important to bring a complete, current list of your medications to any medical appointments or hospitalizations.    For any questions about your discharge, please call the cardiac surgery office at [phone]

## 2024-08-13 NOTE — HOSPITAL COURSE
Nevaeh Fair is a 78 y.o. with PMH of SAH, anxiety on chronic benzos, chronic pain with multiple opioid dispenses in the past year per OARRS, tremors, ex smoker, CAD, HTN, HLD, R ICA stenosis, GERD and DMII who was having worsening exertional angina and was found to have MVD on C.     Operative Procedures Sukhdev tellez on 8/9/24  Off Pump Coronary Artery Bypass Graft x 2; LIMA-LAD; Vein-OM2  [address] - [place] CORONARY ARTERY BYP W/VEIN & ARTERY GRAFT 2 VEIN  1.  Off-pump CABG x 3 with skeletonized LIMA to LAD, right saphenous vein as a Y graft to ramus and OM.  2.  Endoscopic vein harvest.  3.  Sternal plating.    CTICU course   Transferred to LT3 on 8/10/24  =========================    Floor Course:  POD # 4 s/p off pump CABG x 3, closed with sternal plates on 8/9 with Bigg Tellez.   - Patient was diuresed for fluid volume overload post cardiac surgery; Preop weight: Weight: 68.3 kg (150 lb 9.2 oz)kg, discharge wt:   Vitals:    08/13/24 0100   Weight: 68.2 kg (150 lb 4.8 oz)   kg  - On ASA, statin, BB,  by discharge  - lasix 20 mg po daily x3 days   - No Ace or arbs at discharge d/r soft SBP 90-100s may start at alater time by cards   - continue plavix x1 month for off-pump CABG, Imdur x1 month for radial artery graft   - Pain and anticonstipation meds  - 2v CXR completed  8/11/24  - Epicardial wires CUT on 8/13  - telemetry at discharge NSR   - 2v CXR done 8/11  Post op left apical pneumothorax   - Stable pneumothorax  - No SOB   - RA    - Pt requesting clearance to fly in the next 2-3 weeks. Recommendation provided NOT to fly in a airplane until pneumothorax is completed resolved confirmed and cleared by Dr Bigg tellez. Dr Bigg Tellez aware.   R ICA stenosis 50-69%. needs to Follow up as outpatient with vascular surgery  in 6 months  GERD continue home PPI   Hx of anxiety on chronic benzos, chronic pain on opioids tremors, and SAH. Resumed home Xanax and Seroquel 8/11   OARRS reviewed 8/13 Rx sent Tramadol 5 mg every 6  hrs x 3 days    - Cardiac rehab referral was placed  - PT recs low intensity therapy  - Anticipate discharge to home with homecare    Discharged on 8/13/24    On day of discharge, vital signs were stable and no acute distress was noted. All questions were answered. After VS and labs were reviewed it was determined the patient was stable for discharge.   Hospital day of discharge management- spent >30 minutes coordinating the discharge and counseling/educating patient and family regarding discharge instructions.  ========================    Past Medical History:  Diagnosis Date  · Abdominal cramping    · Anxiety    · Coronary artery disease    · Familial hypercholesterolemia 04/17/2014    Essential familial hypercholesterolemia  · Hypertension    · Presence of intraocular lens 07/10/2014    Pseudophakia, left eye          PSH:   Surgical History  Past Surgical History:  Procedure Laterality Date  · CARDIAC CATHETERIZATION N/A 07/10/2024    Procedure: Left Heart Cath with Coronary Angiography and LV;  Surgeon: Aditya Trejo MD;  Location: [facility] Cardiac Cath Lab;  Service: Cardiovascular;  Laterality: N/A;  7/10/24; 930 am for [facility] [id number], CAD with angina I25.119 and hx stent Z98.61  [facility] medicare:  auth # [id number] 7/4/24-08/18/24  · CATARACT EXTRACTION      · COLONOSCOPY      · ESOPHAGOGASTRODUODENOSCOPY              Social history:   Social History         Socioeconomic History  · Marital status:       Spouse name: Not on file  · Number of children: Not on file  · Years of education: Not on file  · Highest education level: Not on file  Occupational History  · Not on file  Tobacco Use  · Smoking status: Former      Types: Cigarettes  · Smokeless tobacco: Never  Vaping Use  · Vaping status: Never Used  Substance and Sexual Activity  · Alcohol use: Never  · Drug use: Never  · Sexual activity: Defer  Other Topics Concern  · Not on file  Social History Narrative  · Not on file       Social Determinants  of Health       Financial Resource Strain: Not on file  Food Insecurity: Not on file  Transportation Needs: Not on file  Physical Activity: Not on file  Stress: Not on file  Social Connections: Not on file  Intimate Partner Violence: Not on file  Housing Stability: Not on file       Family history:  Family History  Family History  Problem Relation Name Age of Onset  · Heart disease Mother      · Coronary artery disease Mother      · Heart disease Father      · Heart attack Father      · Hypertension Sister           Allergies:   RX Allergies  No Known Allergies       Home meds:   Prescriptions Prior to Admission  Medications Prior to Admission  Medication Sig Dispense Refill Last Dose  · ALPRAZolam (Xanax) 0.5 mg tablet Take 1 tablet (0.5 mg) by mouth 3 times a day as needed for anxiety for up to 7 days. 21 tablet 0 Past Week  · aspirin 81 mg capsule Take 81 mg by mouth once daily.     Past Month  · dicyclomine (Bentyl) 10 mg capsule Take 1 capsule (10 mg) by mouth 4 times a day. 120 capsule 2 Past Week  · nitroglycerin (Nitrostat) 0.4 mg SL tablet       Past Month  · omeprazole (PriLOSEC) 40 mg DR capsule TAKE 1 CAPSULE BY MOUTH TWICE A  capsule 1 8/9/2024  · QUEtiapine (SeroqueL) 50 mg tablet Take 1 tablet (50 mg) by mouth once daily at bedtime. 90 tablet 1 8/8/2024  · rosuvastatin (Crestor) 40 mg tablet TAKE 1 TABLET BY MOUTH EVERYDAY AT BEDTIME 90 tablet 0 Past Week  · semaglutide (Ozempic) 0.25 mg or 0.5 mg (2 mg/3 mL) pen injector Inject 0.5 mg under the skin every 7 days.     Past Week

## 2024-08-13 NOTE — CARE PLAN
Problem: Pain  Goal: Takes deep breaths with improved pain control throughout the shift  Outcome: Progressing  Goal: Turns in bed with improved pain control throughout the shift  Outcome: Progressing  Goal: Walks with improved pain control throughout the shift  Outcome: Progressing  Goal: Performs ADL's with improved pain control throughout shift  Outcome: Progressing  Goal: Participates in PT with improved pain control throughout the shift  Outcome: Progressing  Goal: Free from opioid side effects throughout the shift  Outcome: Progressing  Goal: Free from acute confusion related to pain meds throughout the shift  Outcome: Progressing     The clinical goals for the shift include patient will remain hemodynamically table throughout the shift

## 2024-08-13 NOTE — PROGRESS NOTES
08/13/24 1432   Discharge Planning   Expected Discharge Disposition  Services  ([facility])   Patient Choice   Provider Choice list and CMS website (https://medicare.gov/care-compare#search) for post-acute Quality and Resource Measure Data were provided and reviewed with: Patient   Patient / Family choosing to utilize agency / facility established prior to hospitalization Yes     Patient is medically ready for discharge.  Patient is [facility] SOC confirmed for 24-48 hours.

## 2024-08-13 NOTE — HH CARE COORDINATION
Home Care received a Referral for Nursing and Physical Therapy. We have processed the referral for a Start of Care on 24-48 HOURS .     If you have any questions or concerns, please feel free to contact us at [phone]. Follow the prompts, enter your five digit zip code, and you will be directed to your care team on [facility] 1.

## 2024-08-13 NOTE — DISCHARGE SUMMARY
Discharge Diagnosis  Coronary artery disease involving native coronary artery of native heart with unstable angina pectoris (Multi)    Issues Requiring Follow-Up  none    Test Results Pending At Discharge  Pending Labs       No current pending labs.            Hospital Course  Nevaeh Fair is a 78 y.o. with PMH of SAH, anxiety on chronic benzos, chronic pain with multiple opioid dispenses in the past year per OARRS, tremors, ex smoker, CAD, HTN, HLD, R ICA stenosis, GERD and DMII who was having worsening exertional angina and was found to have MVD on [facility].     Operative Procedures Sukhdev tellez on 8/9/24  Off Pump Coronary Artery Bypass Graft x 2; LIMA-LAD; Vein-OM2  [address] - [place] CORONARY ARTERY BYP W/VEIN & ARTERY GRAFT 2 VEIN  1.  Off-pump CABG x 3 with skeletonized LIMA to LAD, right saphenous vein as a Y graft to ramus and OM.  2.  Endoscopic vein harvest.  3.  Sternal plating.    CTICU course   Transferred to LT3 on 8/10/24  =========================    Floor Course:  - Patient was diuresed for fluid volume overload post cardiac surgery; Preop weight: Weight: 68.3 kg (150 lb 9.2 oz)kg, discharge wt:   Vitals:    08/13/24 0100   Weight: 68.2 kg (150 lb 4.8 oz)   kg  - On ASA, statin, BB,  by discharge  - lasix 20 mg po daily x3 days   - No Ace or arbs at discharge d/r soft SBP 90-100s may start at alater time by cards   - continue plavix x1 month for off-pump CABG, Imdur x1 month for radial artery graft   - Pain and anticonstipation meds  - 2v CXR completed  8/11/24  - Epicardial wires CUT on 8/13  - telemetry at discharge NSR   - 2v CXR done 8/11  Post op left apical pneumothorax   - Stable pneumothorax  - No SOB   - RA    - Pt requesting clearance to fly in the next 2-3 weeks. Recommendation provided NOT to fly in a airplane until pneumothorax is completed resolved confirmed and cleared by Dr Bigg tellez. Dr Bigg Tellez aware.   R ICA stenosis 50-69%. needs to Follow up as outpatient with vascular surgery  in 6  months  GERD continue home PPI   Hx of anxiety on chronic benzos, chronic pain on opioids tremors, and SAH. Resumed home Xanax and Seroquel 8/11   OARRS reviewed 8/13 Rx sent Tramadol 5 mg every 6 hrs x 3 days    - Cardiac rehab referral was placed  - PT recs low intensity therapy  - Anticipate discharge to home with homecare    Discharged on 8/13/24    On day of discharge, vital signs were stable and no acute distress was noted. All questions were answered. After VS and labs were reviewed it was determined the patient was stable for discharge.   Hospital day of discharge management- spent >30 minutes coordinating the discharge and counseling/educating patient and family regarding discharge instructions.  ========================    Past Medical History:  Diagnosis Date  · Abdominal cramping    · Anxiety    · Coronary artery disease    · Familial hypercholesterolemia 04/17/2014    Essential familial hypercholesterolemia  · Hypertension    · Presence of intraocular lens 07/10/2014    Pseudophakia, left eye          PSH:   Surgical History  Past Surgical History:  Procedure Laterality Date  · CARDIAC CATHETERIZATION N/A 07/10/2024    Procedure: Left Heart Cath with Coronary Angiography and LV;  Surgeon: Aditya Trejo MD;  Location: [facility] Cardiac Cath Lab;  Service: Cardiovascular;  Laterality: N/A;  7/10/24; 930 am for [facility] [address], CAD with angina I25.119 and hx stent Z98.61  [facility] medicare:  auth # [id number] 7/4/24-08/18/24  · CATARACT EXTRACTION      · COLONOSCOPY      · ESOPHAGOGASTRODUODENOSCOPY              Social history:   Social History         Socioeconomic History  · Marital status:       Spouse name: Not on file  · Number of children: Not on file  · Years of education: Not on file  · Highest education level: Not on file  Occupational History  · Not on file  Tobacco Use  · Smoking status: Former      Types: Cigarettes  · Smokeless tobacco: Never  Vaping Use  · Vaping status: Never  Used  Substance and Sexual Activity  · Alcohol use: Never  · Drug use: Never  · Sexual activity: Defer  Other Topics Concern  · Not on file  Social History Narrative  · Not on file       Social Determinants of Health       Financial Resource Strain: Not on file  Food Insecurity: Not on file  Transportation Needs: Not on file  Physical Activity: Not on file  Stress: Not on file  Social Connections: Not on file  Intimate Partner Violence: Not on file  Housing Stability: Not on file       Family history:  Family History  Family History  Problem Relation Name Age of Onset  · Heart disease Mother      · Coronary artery disease Mother      · Heart disease Father      · Heart attack Father      · Hypertension Sister           Allergies:   RX Allergies  No Known Allergies       Home meds:   Prescriptions Prior to Admission  Medications Prior to Admission  Medication Sig Dispense Refill Last Dose  · ALPRAZolam (Xanax) 0.5 mg tablet Take 1 tablet (0.5 mg) by mouth 3 times a day as needed for anxiety for up to 7 days. 21 tablet 0 Past Week  · aspirin 81 mg capsule Take 81 mg by mouth once daily.     Past Month  · dicyclomine (Bentyl) 10 mg capsule Take 1 capsule (10 mg) by mouth 4 times a day. 120 capsule 2 Past Week  · nitroglycerin (Nitrostat) 0.4 mg SL tablet       Past Month  · omeprazole (PriLOSEC) 40 mg DR capsule TAKE 1 CAPSULE BY MOUTH TWICE A  capsule 1 8/9/2024  · QUEtiapine (SeroqueL) 50 mg tablet Take 1 tablet (50 mg) by mouth once daily at bedtime. 90 tablet 1 8/8/2024  · rosuvastatin (Crestor) 40 mg tablet TAKE 1 TABLET BY MOUTH EVERYDAY AT BEDTIME 90 tablet 0 Past Week  · semaglutide (Ozempic) 0.25 mg or 0.5 mg (2 mg/3 mL) pen injector Inject 0.5 mg under the skin every 7 days.     Past Week        Pertinent Physical Exam At Time of Discharge  Physical Exam  See daily note   Home Medications     Medication List      START taking these medications     acetaminophen 325 mg tablet; Commonly known as:  Tylenol; Take 2 tablets   (650 mg) by mouth every 6 hours for 10 days.   aspirin 81 mg EC tablet; Take 1 tablet (81 mg) by mouth once daily.;   Start taking on: August 14, 2024; Replaces: aspirin 81 mg capsule   clopidogrel 75 mg tablet; Commonly known as: Plavix; Take 1 tablet (75   mg) by mouth once daily.   ferrous sulfate 324 mg (65 mg elemental iron) EC tablet (delayed   release); Take 1 tablet (65 mg) by mouth once daily with breakfast.   furosemide 20 mg tablet; Commonly known as: Lasix; Take 1 tablet (20 mg)   by mouth once daily for 3 days.   metoprolol tartrate 25 mg tablet; Commonly known as: Lopressor; Take 1   tablet (25 mg) by mouth 2 times a day.   sennosides-docusate sodium 8.6-50 mg tablet; Commonly known as:   Katarina-Colace; Take 2 tablets by mouth 2 times a day for 5 days. While   taking narcotics to prevent constipation   traMADol 50 mg tablet; Commonly known as: Ultram; Take 1 tablet (50 mg)   by mouth every 6 hours if needed for severe pain (7 - 10) for up to 3   days.     CONTINUE taking these medications     ALPRAZolam 0.5 mg tablet; Commonly known as: Xanax; Take 1 tablet (0.5   mg) by mouth 3 times a day as needed for anxiety for up to 7 days.   dicyclomine 10 mg capsule; Commonly known as: Bentyl; Take 1 capsule (10   mg) by mouth 4 times a day.   omeprazole 40 mg DR capsule; Commonly known as: PriLOSEC; TAKE 1 CAPSULE   BY MOUTH TWICE A DAY   Ozempic 0.25 mg or 0.5 mg (2 mg/3 mL) pen injector; Generic drug:   semaglutide   QUEtiapine 50 mg tablet; Commonly known as: SeroqueL; Take 1 tablet (50   mg) by mouth once daily at bedtime.   rosuvastatin 40 mg tablet; Commonly known as: Crestor; TAKE 1 TABLET BY   MOUTH EVERYDAY AT BEDTIME     STOP taking these medications     aspirin 81 mg capsule; Replaced by: aspirin 81 mg EC tablet   Nitrostat 0.4 mg SL tablet; Generic drug: nitroglycerin       Outpatient Follow-Up  Future Appointments   Date Time Provider Department Center   12/12/2024  1:20 PM  Beba Montgomery DO [id number] [facility] Johnathan Galicia, APRN-CNP

## 2024-08-14 DIAGNOSIS — R10.9 ABDOMINAL CRAMPING: ICD-10-CM

## 2024-08-15 ENCOUNTER — PATIENT OUTREACH (OUTPATIENT)
Dept: PRIMARY CARE | Facility: CLINIC | Age: 79
End: 2024-08-15
Payer: MEDICARE

## 2024-08-15 RX ORDER — DICYCLOMINE HYDROCHLORIDE 10 MG/1
10 CAPSULE ORAL 4 TIMES DAILY
Qty: 360 CAPSULE | Refills: 0 | Status: SHIPPED | OUTPATIENT
Start: 2024-08-15 | End: 2024-11-13

## 2024-08-19 ENCOUNTER — HOME CARE VISIT (OUTPATIENT)
Dept: HOME HEALTH SERVICES | Facility: HOME HEALTH | Age: 79
End: 2024-08-19
Payer: MEDICARE

## 2024-08-19 VITALS
SYSTOLIC BLOOD PRESSURE: 115 MMHG | RESPIRATION RATE: 16 BRPM | DIASTOLIC BLOOD PRESSURE: 70 MMHG | HEART RATE: 66 BPM | TEMPERATURE: 97.7 F | OXYGEN SATURATION: 100 %

## 2024-08-19 DIAGNOSIS — G47.00 INSOMNIA, UNSPECIFIED TYPE: ICD-10-CM

## 2024-08-19 PROCEDURE — G0299 HHS/HOSPICE OF RN EA 15 MIN: HCPCS

## 2024-08-19 ASSESSMENT — ENCOUNTER SYMPTOMS: DENIES PAIN: 1

## 2024-08-20 RX ORDER — QUETIAPINE FUMARATE 25 MG/1
TABLET, FILM COATED ORAL
Qty: 180 TABLET | OUTPATIENT
Start: 2024-08-20

## 2024-08-20 ASSESSMENT — ACTIVITIES OF DAILY LIVING (ADL)
OASIS_M1830: 00
ENTERING_EXITING_HOME: SUPERVISION

## 2024-08-20 ASSESSMENT — ENCOUNTER SYMPTOMS
APPETITE LEVEL: GOOD
LAST BOWEL MOVEMENT: 67071

## 2024-08-21 ENCOUNTER — HOME CARE VISIT (OUTPATIENT)
Dept: HOME HEALTH SERVICES | Facility: HOME HEALTH | Age: 79
End: 2024-08-21
Payer: MEDICARE

## 2024-08-21 VITALS — HEART RATE: 88 BPM | OXYGEN SATURATION: 98 % | RESPIRATION RATE: 14 BRPM

## 2024-08-21 DIAGNOSIS — E11.8 CONTROLLED TYPE 2 DIABETES MELLITUS WITH COMPLICATION, WITHOUT LONG-TERM CURRENT USE OF INSULIN (MULTI): Primary | ICD-10-CM

## 2024-08-21 PROCEDURE — G0151 HHCP-SERV OF PT,EA 15 MIN: HCPCS

## 2024-08-21 SDOH — HEALTH STABILITY: PHYSICAL HEALTH: EXERCISE TYPE: B LE

## 2024-08-21 SDOH — HEALTH STABILITY: PHYSICAL HEALTH: PHYSICAL EXERCISE: 15

## 2024-08-21 SDOH — HEALTH STABILITY: PHYSICAL HEALTH: EXERCISE ACTIVITIES SETS: 2

## 2024-08-21 SDOH — HEALTH STABILITY: PHYSICAL HEALTH: EXERCISE ACTIVITY: HIP 3 WAY, TKE, SH CIRCLES, SH ROLLS

## 2024-08-21 ASSESSMENT — ENCOUNTER SYMPTOMS
LOWEST PAIN SEVERITY IN PAST 24 HOURS: 1/10
PAIN LOCATION: CHEST
PAIN LOCATION - PAIN FREQUENCY: INTERMITTENT
PERSON REPORTING PAIN: PATIENT
HIGHEST PAIN SEVERITY IN PAST 24 HOURS: 3/10
PAIN LOCATION - PAIN DURATION: MIN
PAIN LOCATION - RELIEVING FACTORS: REST
PAIN LOCATION - PAIN QUALITY: ACHE
MUSCLE WEAKNESS: 1
PAIN SEVERITY GOAL: 0/10
PAIN: 1
SUBJECTIVE PAIN PROGRESSION: WAXING AND WANING
PAIN LOCATION - EXACERBATING FACTORS: MVT
PAIN LOCATION - PAIN SEVERITY: 3/10

## 2024-08-21 ASSESSMENT — ACTIVITIES OF DAILY LIVING (ADL)
CURRENT_FUNCTION: INDEPENDENT
PHYSICAL TRANSFERS ASSESSED: 1

## 2024-08-22 ENCOUNTER — APPOINTMENT (OUTPATIENT)
Dept: CARDIAC SURGERY | Facility: HOSPITAL | Age: 79
End: 2024-08-22
Payer: MEDICARE

## 2024-08-22 ENCOUNTER — TELEPHONE (OUTPATIENT)
Dept: PRIMARY CARE | Facility: CLINIC | Age: 79
End: 2024-08-22
Payer: MEDICARE

## 2024-08-26 ENCOUNTER — APPOINTMENT (OUTPATIENT)
Dept: PRIMARY CARE | Facility: CLINIC | Age: 79
End: 2024-08-26
Payer: MEDICARE

## 2024-08-28 ENCOUNTER — TELEPHONE (OUTPATIENT)
Dept: PRIMARY CARE | Facility: CLINIC | Age: 79
End: 2024-08-28
Payer: MEDICARE

## 2024-08-30 ENCOUNTER — HOME CARE VISIT (OUTPATIENT)
Dept: HOME HEALTH SERVICES | Facility: HOME HEALTH | Age: 79
End: 2024-08-30
Payer: MEDICARE

## 2024-08-30 NOTE — PROGRESS NOTES
Discharge Facility:  [facility]   Discharge Diagnosis:  Coronary artery disease involving native coronary artery of native heart with unstable angina pectoris     Admission Date:  8/9/24   Discharge Date:  8/13/24     PCP Appointment Date:- task to  office   Specialist Appointment Date:  8/22/24 cardiac surgery   Hospital Encounter and Summary Linked: Yes    Unable to reach patient x2 attempts. Voicemail left with call back number.  
Detail Level: Zone
Initiate Treatment: I counseled the patient regarding the following:\\nSun screen (SPF 30 or greater) should be applied during peak UV exposure (between 10am and 2pm) and reapplied after exercise or swimming.\\nThe ABCDEs of melanoma were reviewed with the patient, and the importance of monthly self-examination of moles was emphasized. Should any moles change in shape or color, or itch, bleed or burn, pt will contact our office for evaluation sooner then their interval appointment.\\n\\nI recommended the following:\\nSunscreen\\nSelf-Skin Exams

## 2024-09-03 ENCOUNTER — APPOINTMENT (OUTPATIENT)
Dept: PRIMARY CARE | Facility: CLINIC | Age: 79
End: 2024-09-03
Payer: MEDICARE

## 2024-09-05 ENCOUNTER — PATIENT OUTREACH (OUTPATIENT)
Dept: PRIMARY CARE | Facility: CLINIC | Age: 79
End: 2024-09-05
Payer: MEDICARE

## 2024-09-05 ENCOUNTER — HOME CARE VISIT (OUTPATIENT)
Dept: HOME HEALTH SERVICES | Facility: HOME HEALTH | Age: 79
End: 2024-09-05
Payer: MEDICARE

## 2024-09-05 NOTE — PROGRESS NOTES
9/5/2024 - Unable to reach patient for call back after patient's hospitalization. Patient has a follow up appointment with Dr. Montgomery on 9/10/2024. LVM with call back number for patient to call if needed.     9/6/2024- Returned a voicemail message from patient. Patient stated there was some confusion with her cardiac meds at her follow up appt with cardiology. Today she is going for an echocardiogram and hopes things will be straightened out soon. Confirmed upcoming appt with Dr. Montgomery 9/10/2024.

## 2024-09-10 ENCOUNTER — LAB (OUTPATIENT)
Dept: LAB | Facility: LAB | Age: 79
End: 2024-09-10
Payer: MEDICARE

## 2024-09-10 ENCOUNTER — APPOINTMENT (OUTPATIENT)
Dept: PRIMARY CARE | Facility: CLINIC | Age: 79
End: 2024-09-10
Payer: MEDICARE

## 2024-09-10 VITALS
OXYGEN SATURATION: 98 % | WEIGHT: 147.6 LBS | HEART RATE: 73 BPM | HEIGHT: 65 IN | DIASTOLIC BLOOD PRESSURE: 68 MMHG | SYSTOLIC BLOOD PRESSURE: 106 MMHG | BODY MASS INDEX: 24.59 KG/M2

## 2024-09-10 DIAGNOSIS — Z95.1 HISTORY OF CORONARY ARTERY BYPASS GRAFT X 3: ICD-10-CM

## 2024-09-10 DIAGNOSIS — E87.6 HYPOKALEMIA: Primary | ICD-10-CM

## 2024-09-10 DIAGNOSIS — Z09 HOSPITAL DISCHARGE FOLLOW-UP: Primary | ICD-10-CM

## 2024-09-10 DIAGNOSIS — I10 HTN, GOAL BELOW 140/90: ICD-10-CM

## 2024-09-10 DIAGNOSIS — E11.8 CONTROLLED TYPE 2 DIABETES MELLITUS WITH COMPLICATION, WITHOUT LONG-TERM CURRENT USE OF INSULIN (MULTI): ICD-10-CM

## 2024-09-10 DIAGNOSIS — R21 RASH: ICD-10-CM

## 2024-09-10 DIAGNOSIS — J95.811 POSTPROCEDURAL PNEUMOTHORAX: ICD-10-CM

## 2024-09-10 LAB
ALBUMIN SERPL BCP-MCNC: 4.3 G/DL (ref 3.4–5)
ANION GAP SERPL CALC-SCNC: 19 MMOL/L (ref 10–20)
BASOPHILS # BLD AUTO: 0.03 X10*3/UL (ref 0–0.1)
BASOPHILS NFR BLD AUTO: 0.4 %
BUN SERPL-MCNC: 13 MG/DL (ref 6–23)
CALCIUM SERPL-MCNC: 9.3 MG/DL (ref 8.6–10.3)
CHLORIDE SERPL-SCNC: 99 MMOL/L (ref 98–107)
CO2 SERPL-SCNC: 28 MMOL/L (ref 21–32)
CREAT SERPL-MCNC: 0.9 MG/DL (ref 0.5–1.05)
EGFRCR SERPLBLD CKD-EPI 2021: 65 ML/MIN/1.73M*2
EOSINOPHIL # BLD AUTO: 0.12 X10*3/UL (ref 0–0.4)
EOSINOPHIL NFR BLD AUTO: 1.8 %
ERYTHROCYTE [DISTWIDTH] IN BLOOD BY AUTOMATED COUNT: 14.3 % (ref 11.5–14.5)
GLUCOSE SERPL-MCNC: 98 MG/DL (ref 74–99)
HCT VFR BLD AUTO: 39.2 % (ref 36–46)
HGB BLD-MCNC: 12.6 G/DL (ref 12–16)
IMM GRANULOCYTES # BLD AUTO: 0.03 X10*3/UL (ref 0–0.5)
IMM GRANULOCYTES NFR BLD AUTO: 0.4 % (ref 0–0.9)
LYMPHOCYTES # BLD AUTO: 1.78 X10*3/UL (ref 0.8–3)
LYMPHOCYTES NFR BLD AUTO: 26.2 %
MAGNESIUM SERPL-MCNC: 2.33 MG/DL (ref 1.6–2.4)
MCH RBC QN AUTO: 32.5 PG (ref 26–34)
MCHC RBC AUTO-ENTMCNC: 32.1 G/DL (ref 32–36)
MCV RBC AUTO: 101 FL (ref 80–100)
MONOCYTES # BLD AUTO: 0.74 X10*3/UL (ref 0.05–0.8)
MONOCYTES NFR BLD AUTO: 10.9 %
NEUTROPHILS # BLD AUTO: 4.09 X10*3/UL (ref 1.6–5.5)
NEUTROPHILS NFR BLD AUTO: 60.3 %
NRBC BLD-RTO: 0 /100 WBCS (ref 0–0)
PHOSPHATE SERPL-MCNC: 4.3 MG/DL (ref 2.5–4.9)
PLATELET # BLD AUTO: 199 X10*3/UL (ref 150–450)
POTASSIUM SERPL-SCNC: 3.1 MMOL/L (ref 3.5–5.3)
RBC # BLD AUTO: 3.88 X10*6/UL (ref 4–5.2)
SODIUM SERPL-SCNC: 143 MMOL/L (ref 136–145)
WBC # BLD AUTO: 6.8 X10*3/UL (ref 4.4–11.3)

## 2024-09-10 PROCEDURE — 1123F ACP DISCUSS/DSCN MKR DOCD: CPT | Performed by: STUDENT IN AN ORGANIZED HEALTH CARE EDUCATION/TRAINING PROGRAM

## 2024-09-10 PROCEDURE — 1159F MED LIST DOCD IN RCRD: CPT | Performed by: STUDENT IN AN ORGANIZED HEALTH CARE EDUCATION/TRAINING PROGRAM

## 2024-09-10 PROCEDURE — 80069 RENAL FUNCTION PANEL: CPT

## 2024-09-10 PROCEDURE — 85025 COMPLETE CBC W/AUTO DIFF WBC: CPT

## 2024-09-10 PROCEDURE — 1036F TOBACCO NON-USER: CPT | Performed by: STUDENT IN AN ORGANIZED HEALTH CARE EDUCATION/TRAINING PROGRAM

## 2024-09-10 PROCEDURE — 1111F DSCHRG MED/CURRENT MED MERGE: CPT | Performed by: STUDENT IN AN ORGANIZED HEALTH CARE EDUCATION/TRAINING PROGRAM

## 2024-09-10 PROCEDURE — 36415 COLL VENOUS BLD VENIPUNCTURE: CPT

## 2024-09-10 PROCEDURE — 3078F DIAST BP <80 MM HG: CPT | Performed by: STUDENT IN AN ORGANIZED HEALTH CARE EDUCATION/TRAINING PROGRAM

## 2024-09-10 PROCEDURE — 99214 OFFICE O/P EST MOD 30 MIN: CPT | Performed by: STUDENT IN AN ORGANIZED HEALTH CARE EDUCATION/TRAINING PROGRAM

## 2024-09-10 PROCEDURE — 3074F SYST BP LT 130 MM HG: CPT | Performed by: STUDENT IN AN ORGANIZED HEALTH CARE EDUCATION/TRAINING PROGRAM

## 2024-09-10 PROCEDURE — 83735 ASSAY OF MAGNESIUM: CPT

## 2024-09-10 RX ORDER — POTASSIUM CHLORIDE 20 MEQ/1
40 TABLET, EXTENDED RELEASE ORAL ONCE
Qty: 2 TABLET | Refills: 0 | Status: SHIPPED | OUTPATIENT
Start: 2024-09-10 | End: 2024-09-10

## 2024-09-10 RX ORDER — SEMAGLUTIDE 0.68 MG/ML
0.5 INJECTION, SOLUTION SUBCUTANEOUS
Qty: 9 ML | Refills: 1 | Status: SHIPPED | OUTPATIENT
Start: 2024-09-10

## 2024-09-10 RX ORDER — ISOSORBIDE MONONITRATE 30 MG/1
TABLET, EXTENDED RELEASE ORAL
COMMUNITY
Start: 2024-09-06

## 2024-09-10 RX ORDER — HYDROCORTISONE 25 MG/G
CREAM TOPICAL 2 TIMES DAILY PRN
Qty: 30 G | Refills: 2 | Status: SHIPPED | OUTPATIENT
Start: 2024-09-10 | End: 2025-09-10

## 2024-09-10 ASSESSMENT — PATIENT HEALTH QUESTIONNAIRE - PHQ9
2. FEELING DOWN, DEPRESSED OR HOPELESS: NOT AT ALL
1. LITTLE INTEREST OR PLEASURE IN DOING THINGS: NOT AT ALL
SUM OF ALL RESPONSES TO PHQ9 QUESTIONS 1 AND 2: 0

## 2024-09-10 NOTE — PROGRESS NOTES
Subjective   Patient ID: Nevaeh Fair is a 78 y.o. female who presents for Follow-up (Pt is here for a hospital follow up for her triple bypass surgery.).    HPI   Pt was admitted from 8/9-8/13/24 from the [facility] for CABG x 3 with skeletonized LIMA to LAD, right saphenous vein as a Y graft to ramus and OM.     Pt was discharged with ASA, Statin, BB, plavix, PPI   ACE held due to soft BP     Post op complication of a left apical pneumothorax    XENIA sentosis 50-60% follow up with vascular in 6 months     Pt was noted to be on chronic xanax   Pt was given small rx periop, Pt dog is on chronic xanax     At pt fu with cardio- she was given 5 pills of Imdur   Imdur x1 month for radial artery graft but only 5 pills were sent  Unsure if she is to be taking this indefinitely  Continued chest pain     CXR planned     Echocardiogram 8/6/2024   1. Left ventricular ejection fraction is normal, calculated by Andrews's biplane at 57%.   2. Left ventricular cavity size is decreased.   3. There is normal right ventricular global systolic function.   4. RVSP within normal limits.    Objective   Physical Exam  Constitutional:       Appearance: Normal appearance.   Cardiovascular:      Rate and Rhythm: Normal rate and regular rhythm.      Heart sounds: No murmur heard.  Pulmonary:      Effort: Pulmonary effort is normal. No respiratory distress.      Breath sounds: Normal breath sounds. No wheezing.   Chest:          Comments: Well healing c/d/I central sternal incision     Musculoskeletal:      Cervical back: Neck supple.      Left lower leg: No edema.   Neurological:      Mental Status: She is alert.       Assessment/Plan   Diagnoses and all orders for this visit:  Hospital discharge follow-up  History of coronary artery bypass graft x 3  -     CBC and Auto Differential; Future  -     Renal function panel; Future  -     Magnesium; Future  Postprocedural pneumothorax  Controlled type 2 diabetes mellitus with  complication, without long-term current use of insulin (Multi)  -     semaglutide (Ozempic) 0.25 mg or 0.5 mg (2 mg/3 mL) pen injector; Inject 0.5 mg under the skin every 7 days.  HTN, goal below 140/90  -     CBC and Auto Differential; Future  -     Renal function panel; Future  -     Magnesium; Future  Rash  -     hydrocortisone 2.5 % cream; Apply topically 2 times a day as needed for irritation or rash.    Hospital Discharge follow up  S/p CABG x3   Confirmed with Dr. Bigg Tellez pt does not need to proceed forward with imdur for 5 days   Surgical incision c/d/i  Medication list was confirmed: Continue ASA, Statin, BB, plavix, PPI   ICA stenosis: Vascular evaluation pending  Pneumothorax: currently asymptomatic, repeat CXR pending   Sugars have been stable: ozempic refilled     Pt to follow up in 2-3 months     Beba Montgomery DO 09/10/24 10:04 AM

## 2024-09-12 ENCOUNTER — HOME CARE VISIT (OUTPATIENT)
Dept: HOME HEALTH SERVICES | Facility: HOME HEALTH | Age: 79
End: 2024-09-12
Payer: MEDICARE

## 2024-09-13 DIAGNOSIS — Z95.1 S/P CABG (CORONARY ARTERY BYPASS GRAFT): ICD-10-CM

## 2024-09-16 ENCOUNTER — HOSPITAL ENCOUNTER (OUTPATIENT)
Dept: RADIOLOGY | Facility: HOSPITAL | Age: 79
Discharge: HOME | End: 2024-09-16
Payer: MEDICARE

## 2024-09-16 ENCOUNTER — HOSPITAL ENCOUNTER (OUTPATIENT)
Dept: CARDIOLOGY | Facility: HOSPITAL | Age: 79
Discharge: HOME | End: 2024-09-16
Payer: MEDICARE

## 2024-09-16 ENCOUNTER — TELEPHONE (OUTPATIENT)
Dept: PRIMARY CARE | Facility: CLINIC | Age: 79
End: 2024-09-16

## 2024-09-16 DIAGNOSIS — Z95.1 S/P CABG (CORONARY ARTERY BYPASS GRAFT): ICD-10-CM

## 2024-09-16 DIAGNOSIS — J95.811 POSTPROCEDURAL PNEUMOTHORAX: ICD-10-CM

## 2024-09-16 LAB
ATRIAL RATE: 62 BPM
P AXIS: 104 DEGREES
P OFFSET: 181 MS
P ONSET: 130 MS
PR INTERVAL: 188 MS
Q ONSET: 224 MS
QRS COUNT: 10 BEATS
QRS DURATION: 72 MS
QT INTERVAL: 414 MS
QTC CALCULATION(BAZETT): 420 MS
QTC FREDERICIA: 418 MS
R AXIS: 35 DEGREES
T AXIS: 174 DEGREES
T OFFSET: 431 MS
VENTRICULAR RATE: 62 BPM

## 2024-09-16 PROCEDURE — 71046 X-RAY EXAM CHEST 2 VIEWS: CPT

## 2024-09-16 PROCEDURE — 71046 X-RAY EXAM CHEST 2 VIEWS: CPT | Performed by: RADIOLOGY

## 2024-09-16 PROCEDURE — 93010 ELECTROCARDIOGRAM REPORT: CPT | Performed by: INTERNAL MEDICINE

## 2024-09-16 PROCEDURE — 93005 ELECTROCARDIOGRAM TRACING: CPT

## 2024-09-17 ENCOUNTER — OFFICE VISIT (OUTPATIENT)
Dept: CARDIAC SURGERY | Facility: CLINIC | Age: 79
End: 2024-09-17
Payer: MEDICARE

## 2024-09-17 VITALS
HEART RATE: 66 BPM | HEIGHT: 65 IN | OXYGEN SATURATION: 98 % | SYSTOLIC BLOOD PRESSURE: 160 MMHG | BODY MASS INDEX: 24.49 KG/M2 | WEIGHT: 147 LBS | DIASTOLIC BLOOD PRESSURE: 86 MMHG | RESPIRATION RATE: 18 BRPM

## 2024-09-17 DIAGNOSIS — I25.10 ARTERIOSCLEROSIS OF CORONARY ARTERY: Primary | ICD-10-CM

## 2024-09-17 PROCEDURE — 1126F AMNT PAIN NOTED NONE PRSNT: CPT | Performed by: PHYSICIAN ASSISTANT

## 2024-09-17 PROCEDURE — 1036F TOBACCO NON-USER: CPT | Performed by: PHYSICIAN ASSISTANT

## 2024-09-17 PROCEDURE — 3077F SYST BP >= 140 MM HG: CPT | Performed by: PHYSICIAN ASSISTANT

## 2024-09-17 PROCEDURE — 99213 OFFICE O/P EST LOW 20 MIN: CPT | Performed by: PHYSICIAN ASSISTANT

## 2024-09-17 PROCEDURE — 3079F DIAST BP 80-89 MM HG: CPT | Performed by: PHYSICIAN ASSISTANT

## 2024-09-17 PROCEDURE — 1159F MED LIST DOCD IN RCRD: CPT | Performed by: PHYSICIAN ASSISTANT

## 2024-09-17 PROCEDURE — 1123F ACP DISCUSS/DSCN MKR DOCD: CPT | Performed by: PHYSICIAN ASSISTANT

## 2024-09-17 ASSESSMENT — LIFESTYLE VARIABLES
HOW OFTEN DO YOU HAVE A DRINK CONTAINING ALCOHOL: NEVER
HOW MANY STANDARD DRINKS CONTAINING ALCOHOL DO YOU HAVE ON A TYPICAL DAY: PATIENT DOES NOT DRINK
SKIP TO QUESTIONS 9-10: 1
AUDIT-C TOTAL SCORE: 0
HOW OFTEN DO YOU HAVE SIX OR MORE DRINKS ON ONE OCCASION: NEVER

## 2024-09-17 ASSESSMENT — ENCOUNTER SYMPTOMS
LOSS OF SENSATION IN FEET: 0
DEPRESSION: 0
OCCASIONAL FEELINGS OF UNSTEADINESS: 0

## 2024-09-17 ASSESSMENT — PAIN SCALES - GENERAL: PAINLEVEL: 0-NO PAIN

## 2024-09-17 NOTE — PROGRESS NOTES
CARDIOTHORACIC SURGERY FOLLOW-UP PROGRESS NOTE    Subjective   Patient is a 78 y.o. female who presents for routine post-operative follow up. She comes in today complaining of nothing. Her activity level has been good.       Objective   Physical Exam  There were no vitals taken for this visit.  Heart: regular rate and rhythm, no murmurs  Lungs: clear to auscultation bilaterally  Extremities: warm and well-perfused, peripheral pulses intact, no cyanosis, clubbing, or edema  Incision(s): sternum stable. Wounds healing well    Data Review:  ECG: normal EKG, normal sinus rhythm  X-ray: normal chest x-ray    Assessment/Plan   There are no diagnoses linked to this encounter.  On 8/9/24 the patient underwent CABG off pump LIMA-LAD,SVG y ramus^OM.EVH. Patient did well post op. Did have a very small left apical pneumothorax while in house. Today's cxr without any pneumothorax. Patient doing well.  Patient questioned need for plavix and imdur. Plavix for 1 year protective of grafts and no need for Imdur.Patient to follow up with her cardiologist. We will follow on a prn basis.  Kaitlyn Avendaño PA-C

## 2024-09-18 ASSESSMENT — ACTIVITIES OF DAILY LIVING (ADL)
OASIS_M1830: 00
HOME_HEALTH_OASIS: 00

## 2024-09-26 ENCOUNTER — PATIENT OUTREACH (OUTPATIENT)
Dept: PRIMARY CARE | Facility: CLINIC | Age: 79
End: 2024-09-26
Payer: MEDICARE

## 2024-10-09 ENCOUNTER — TELEPHONE (OUTPATIENT)
Dept: PRIMARY CARE | Facility: CLINIC | Age: 79
End: 2024-10-09
Payer: MEDICARE

## 2024-10-15 DIAGNOSIS — G47.00 INSOMNIA, UNSPECIFIED TYPE: ICD-10-CM

## 2024-10-15 RX ORDER — QUETIAPINE FUMARATE 50 MG/1
50 TABLET, FILM COATED ORAL NIGHTLY
Qty: 90 TABLET | Refills: 1 | Status: SHIPPED | OUTPATIENT
Start: 2024-10-15 | End: 2025-04-13

## 2024-11-05 ENCOUNTER — HOSPITAL ENCOUNTER (OUTPATIENT)
Dept: RADIOLOGY | Facility: HOSPITAL | Age: 79
Discharge: HOME | End: 2024-11-05
Payer: MEDICARE

## 2024-11-05 ENCOUNTER — OFFICE VISIT (OUTPATIENT)
Dept: PRIMARY CARE | Facility: CLINIC | Age: 79
End: 2024-11-05
Payer: MEDICARE

## 2024-11-05 VITALS — HEART RATE: 84 BPM | WEIGHT: 148 LBS | OXYGEN SATURATION: 98 % | HEIGHT: 65 IN | BODY MASS INDEX: 24.66 KG/M2

## 2024-11-05 DIAGNOSIS — F32.A DEPRESSION, UNSPECIFIED DEPRESSION TYPE: ICD-10-CM

## 2024-11-05 DIAGNOSIS — R06.02 SOB (SHORTNESS OF BREATH): Primary | ICD-10-CM

## 2024-11-05 DIAGNOSIS — R06.02 SOB (SHORTNESS OF BREATH): ICD-10-CM

## 2024-11-05 DIAGNOSIS — G47.00 INSOMNIA, UNSPECIFIED TYPE: ICD-10-CM

## 2024-11-05 PROCEDURE — 1036F TOBACCO NON-USER: CPT | Performed by: STUDENT IN AN ORGANIZED HEALTH CARE EDUCATION/TRAINING PROGRAM

## 2024-11-05 PROCEDURE — 1158F ADVNC CARE PLAN TLK DOCD: CPT | Performed by: STUDENT IN AN ORGANIZED HEALTH CARE EDUCATION/TRAINING PROGRAM

## 2024-11-05 PROCEDURE — 71046 X-RAY EXAM CHEST 2 VIEWS: CPT | Performed by: RADIOLOGY

## 2024-11-05 PROCEDURE — 71046 X-RAY EXAM CHEST 2 VIEWS: CPT

## 2024-11-05 PROCEDURE — 99214 OFFICE O/P EST MOD 30 MIN: CPT | Performed by: STUDENT IN AN ORGANIZED HEALTH CARE EDUCATION/TRAINING PROGRAM

## 2024-11-05 PROCEDURE — 1159F MED LIST DOCD IN RCRD: CPT | Performed by: STUDENT IN AN ORGANIZED HEALTH CARE EDUCATION/TRAINING PROGRAM

## 2024-11-05 PROCEDURE — 1123F ACP DISCUSS/DSCN MKR DOCD: CPT | Performed by: STUDENT IN AN ORGANIZED HEALTH CARE EDUCATION/TRAINING PROGRAM

## 2024-11-05 RX ORDER — VENLAFAXINE HYDROCHLORIDE 37.5 MG/1
37.5 CAPSULE, EXTENDED RELEASE ORAL DAILY
Qty: 30 CAPSULE | Refills: 5 | Status: SHIPPED | OUTPATIENT
Start: 2024-11-05 | End: 2024-11-05

## 2024-11-05 RX ORDER — VENLAFAXINE HYDROCHLORIDE 37.5 MG/1
37.5 CAPSULE, EXTENDED RELEASE ORAL DAILY
Qty: 30 CAPSULE | Refills: 5 | Status: SHIPPED | OUTPATIENT
Start: 2024-11-05 | End: 2025-05-04

## 2024-11-05 RX ORDER — QUETIAPINE FUMARATE 25 MG/1
75 TABLET, FILM COATED ORAL NIGHTLY
Qty: 90 TABLET | Refills: 1 | Status: SHIPPED | OUTPATIENT
Start: 2024-11-05 | End: 2025-01-04

## 2024-11-05 RX ORDER — QUETIAPINE FUMARATE 25 MG/1
75 TABLET, FILM COATED ORAL NIGHTLY
Qty: 90 TABLET | Refills: 1 | Status: SHIPPED | OUTPATIENT
Start: 2024-11-05 | End: 2024-11-05

## 2024-11-05 NOTE — PROGRESS NOTES
Subjective   Patient ID: Nevaeh Fair is a 78 y.o. female who presents for Follow-up (Patient would like to discuss her medications and mentioned she had open heart surgery a couple weeks ago and would like to have a chest xray done due to her shortness of breath. Patients blood pressure is 144/93).    HPI  SOB with exertion   Back to work from 9-4pm. She is lifting at work.   She has noted her voice is quivering.     Pt was admitted from 8/9-8/13/24 from the [facility] for CABG x 3 with skeletonized LIMA to LAD, right saphenous vein as a Y graft to ramus and OM.      Pt was discharged with ASA, Statin, BB, plavix, PPI   ACE held due to soft BP      Post op complication of a left apical pneumothorax     Struggling with motivation   Very tearful  Lack of energy of wanting to go out and doing activities she normally enjoys.  Worsened after she had her CABG.  Just wants to sit and read. She doesn't want to answer the phone and seeing her girlfriend.     Objective   Physical Exam  Vitals reviewed.   Constitutional:       Appearance: Normal appearance.   Cardiovascular:      Rate and Rhythm: Normal rate and regular rhythm.      Heart sounds: No murmur heard.  Pulmonary:      Effort: Pulmonary effort is normal. No respiratory distress.      Breath sounds: Normal breath sounds. No wheezing.   Musculoskeletal:      Cervical back: Neck supple.      Left lower leg: No edema.   Neurological:      Mental Status: She is alert.           Current Outpatient Medications:     clopidogrel (Plavix) 75 mg tablet, Take 1 tablet (75 mg) by mouth once daily., Disp: 30 tablet, Rfl: 11    dicyclomine (Bentyl) 10 mg capsule, TAKE 1 CAPSULE (10 MG) BY MOUTH 4 TIMES A DAY., Disp: 360 capsule, Rfl: 0    hydrocortisone 2.5 % cream, Apply topically 2 times a day as needed for irritation or rash., Disp: 30 g, Rfl: 2    isosorbide mononitrate ER (Imdur) 30 mg 24 hr tablet, TAKE 1 TABLET BY MOUTH DAILY - TAKE WITH WATER (SWALLOW  WHOLE), Disp: , Rfl:     omeprazole (PriLOSEC) 40 mg DR capsule, TAKE 1 CAPSULE BY MOUTH TWICE A DAY, Disp: 180 capsule, Rfl: 1    QUEtiapine (SEROquel) 50 mg tablet, TAKE 1 TABLET (50 MG) BY MOUTH ONCE DAILY AT BEDTIME., Disp: 90 tablet, Rfl: 1    rosuvastatin (Crestor) 40 mg tablet, TAKE 1 TABLET BY MOUTH EVERYDAY AT BEDTIME, Disp: 90 tablet, Rfl: 0    semaglutide (Ozempic) 0.25 mg or 0.5 mg (2 mg/3 mL) pen injector, Inject 0.5 mg under the skin every 7 days., Disp: 9 mL, Rfl: 1    ALPRAZolam (Xanax) 0.5 mg tablet, Take 1 tablet (0.5 mg) by mouth 3 times a day as needed for anxiety for up to 7 days. (Patient not taking: Reported on 9/17/2024), Disp: 21 tablet, Rfl: 0    furosemide (Lasix) 20 mg tablet, Take 1 tablet (20 mg) by mouth once daily for 3 days. (Patient not taking: Reported on 9/17/2024), Disp: 3 tablet, Rfl: 0    metoprolol tartrate (Lopressor) 25 mg tablet, Take 1 tablet (25 mg) by mouth 2 times a day., Disp: 60 tablet, Rfl: 0    Assessment/Plan   Diagnoses and all orders for this visit:  SOB (shortness of breath)  -     XR chest 2 views; Future  Depression, unspecified depression type  -     venlafaxine XR (Effexor-XR) 37.5 mg 24 hr capsule; Take 1 capsule (37.5 mg) by mouth once daily. Do not crush or chew.  Insomnia, unspecified type  -     QUEtiapine (SEROquel) 25 mg tablet; Take 3 tablets (75 mg) by mouth once daily at bedtime.    Pt has concerns about a return of sob   Complication of a pneumothorax at the time of her CABG  Latest CXR was 9/17/24 which was significant for right pleural effusions  IMPRESSION:  1. Small right pleural effusion.  CXR       Anxiety/Insomnia/Depression -please follow Sleep hygiene instruction   Start Venlafaxine 37.5 mg  Previous medications: ramelteon 8mg at bedtime, off Xanax and Valium   Sleep as long as necessary to feel rested (usually seven to eight hours for adults) and then get out of bed  Maintain a regular sleep schedule, particularly a regular wake-up time  in the morning  Try not to force sleep  -refilled ramelteon 8mg daily   -Off Xanax and Valium       Type 2 Diabetes today  A1c 6.1%  worsening, start ozempic 0.5mg daily   sample given  - Recommended healthy diet- Mediterranean diet  - Recommended increased physical activity   - Limit alcohol consumption  - NO smoking   - Check your blood glucose readings before meals and at bedtime  - Please let us know if you have hypoglycemic symptoms such as tremor, dizziness, weakness, drowsiness, and confusion or altered mental status, palpitations and sweating and also blood sugar less than 70  - hemoglobin A1c trend is 5.8  - See ophthalmologist yearly base for retinopathy check   - Medications: aspirin 81 mg daily, rosuvastatin 40 mg daily, DC'd Metformin due to better A1c. She will continue with lifestyle modification        B12 Defiencey   severe fatigue and peripheral neuropathy   B12 1000mcg given in office      Rhinorrhea   Pt stopped  ipratropium BID      Anxiety/Insomnia/Depression -please follow Sleep hygiene instruction  Venlafaxine 37.5 mg  Previous medications: ramelteon 8mg at bedtime, off Xanax and Valium   Sleep as long as necessary to feel rested (usually seven to eight hours for adults) and then get out of bed  Maintain a regular sleep schedule, particularly a regular wake-up time in the morning  Try not to force sleep  -refilled ramelteon 8mg daily   -Off Xanax and Valium     Hypertension/coronary artery disease and 3 stent on 2015  Pt self discontinued  metoprolol 50 daily   good control 110/56mmHg  - Sodium restriction 2400 mg per day  - seeing Cardiology- Dr. Reeves      Chronic Back pain 2/2 lumbar herniated disk   - s/p multiple injection  refilled tizanidine 4mg BID  - seeing orthopedist -Dr. Fuchs- low back pain, hip pain, has been getting shots hip/back  - lidocaine patch refilled     Essential tremor-not on medication  - Neurology- Dr. Ventura- tremor, memory loss- MRI, EEG done in 2018. She no  longer follows up.  refilled tizanidine      GERD  refilled omeprazole 40mg daily       Health maintenance and Preventive Health Screening  - Colonoscopy:8/15/13 repeat 10 years, pt declines further testing  - Mammogram:Not interested  - Dexa Scan:Not interested         Beba Montgomery DO 11/05/24 10:37 AM

## 2024-11-13 ENCOUNTER — PATIENT OUTREACH (OUTPATIENT)
Dept: PRIMARY CARE | Facility: CLINIC | Age: 79
End: 2024-11-13
Payer: MEDICARE

## 2024-11-13 NOTE — PROGRESS NOTES
Unable to reach patient for wrap up of Transitional Care Management (TCM) program. LVM with patient to return call for questions or concerns. The patient has met target of no readmission for (90) days post hospital discharge and is graduated from the TCM program at this time.

## 2024-11-27 DIAGNOSIS — G47.00 INSOMNIA, UNSPECIFIED TYPE: ICD-10-CM

## 2024-11-30 RX ORDER — QUETIAPINE FUMARATE 25 MG/1
75 TABLET, FILM COATED ORAL NIGHTLY
Qty: 270 TABLET | Refills: 1 | Status: SHIPPED | OUTPATIENT
Start: 2024-11-30 | End: 2025-05-29

## 2024-12-09 ENCOUNTER — TELEPHONE (OUTPATIENT)
Dept: PRIMARY CARE | Facility: CLINIC | Age: 79
End: 2024-12-09
Payer: MEDICARE

## 2024-12-11 DIAGNOSIS — Z95.1 STATUS POST CORONARY ARTERY BYPASS GRAFT: Primary | ICD-10-CM

## 2024-12-12 ENCOUNTER — APPOINTMENT (OUTPATIENT)
Dept: PRIMARY CARE | Facility: CLINIC | Age: 79
End: 2024-12-12
Payer: MEDICARE

## 2024-12-25 ENCOUNTER — APPOINTMENT (OUTPATIENT)
Dept: RADIOLOGY | Facility: HOSPITAL | Age: 79
DRG: 175 | End: 2024-12-25
Payer: MEDICARE

## 2024-12-25 ENCOUNTER — HOSPITAL ENCOUNTER (INPATIENT)
Facility: HOSPITAL | Age: 79
LOS: 3 days | Discharge: HOME | DRG: 175 | End: 2024-12-28
Attending: EMERGENCY MEDICINE | Admitting: ANESTHESIOLOGY
Payer: MEDICARE

## 2024-12-25 DIAGNOSIS — N17.9 AKI (ACUTE KIDNEY INJURY) (CMS-HCC): ICD-10-CM

## 2024-12-25 DIAGNOSIS — E83.42 HYPOMAGNESEMIA: ICD-10-CM

## 2024-12-25 DIAGNOSIS — I26.99 ACUTE PULMONARY EMBOLISM, UNSPECIFIED PULMONARY EMBOLISM TYPE, UNSPECIFIED WHETHER ACUTE COR PULMONALE PRESENT (MULTI): Primary | ICD-10-CM

## 2024-12-25 DIAGNOSIS — K21.9 GASTRO-ESOPHAGEAL REFLUX DISEASE WITHOUT ESOPHAGITIS: ICD-10-CM

## 2024-12-25 DIAGNOSIS — K86.89 DILATION OF PANCREATIC DUCT (HHS-HCC): ICD-10-CM

## 2024-12-25 LAB
ABO GROUP (TYPE) IN BLOOD: NORMAL
ALBUMIN SERPL BCP-MCNC: 3 G/DL (ref 3.4–5)
ALP SERPL-CCNC: 80 U/L (ref 33–136)
ALT SERPL W P-5'-P-CCNC: 9 U/L (ref 7–45)
ANION GAP BLDV CALCULATED.4IONS-SCNC: 14 MMOL/L (ref 10–25)
ANION GAP SERPL CALC-SCNC: 13 MMOL/L (ref 10–20)
ANTIBODY SCREEN: NORMAL
APTT PPP: 30 SECONDS (ref 27–38)
AST SERPL W P-5'-P-CCNC: 18 U/L (ref 9–39)
BASE EXCESS BLDV CALC-SCNC: -0.2 MMOL/L (ref -2–3)
BASOPHILS # BLD AUTO: 0.01 X10*3/UL (ref 0–0.1)
BASOPHILS NFR BLD AUTO: 0.2 %
BILIRUB DIRECT SERPL-MCNC: 0 MG/DL (ref 0–0.3)
BILIRUB SERPL-MCNC: 0.7 MG/DL (ref 0–1.2)
BNP SERPL-MCNC: 174 PG/ML (ref 0–99)
BODY TEMPERATURE: 37 DEGREES CELSIUS
BUN SERPL-MCNC: 12 MG/DL (ref 6–23)
CA-I BLDV-SCNC: 1.21 MMOL/L (ref 1.1–1.33)
CALCIUM SERPL-MCNC: 7.9 MG/DL (ref 8.6–10.3)
CARDIAC TROPONIN I PNL SERPL HS: 10 NG/L (ref 0–13)
CARDIAC TROPONIN I PNL SERPL HS: 11 NG/L (ref 0–13)
CHLORIDE BLDV-SCNC: 105 MMOL/L (ref 98–107)
CHLORIDE SERPL-SCNC: 109 MMOL/L (ref 98–107)
CO2 SERPL-SCNC: 23 MMOL/L (ref 21–32)
CREAT SERPL-MCNC: 1.21 MG/DL (ref 0.5–1.05)
EGFRCR SERPLBLD CKD-EPI 2021: 45 ML/MIN/1.73M*2
EOSINOPHIL # BLD AUTO: 0.28 X10*3/UL (ref 0–0.4)
EOSINOPHIL NFR BLD AUTO: 4.3 %
ERYTHROCYTE [DISTWIDTH] IN BLOOD BY AUTOMATED COUNT: 15.5 % (ref 11.5–14.5)
FLUAV RNA RESP QL NAA+PROBE: NOT DETECTED
FLUBV RNA RESP QL NAA+PROBE: NOT DETECTED
GLUCOSE BLDV-MCNC: 163 MG/DL (ref 74–99)
GLUCOSE SERPL-MCNC: 161 MG/DL (ref 74–99)
HCO3 BLDV-SCNC: 26.7 MMOL/L (ref 22–26)
HCT VFR BLD AUTO: 33.5 % (ref 36–46)
HCT VFR BLD EST: 33 % (ref 36–46)
HGB BLD-MCNC: 11 G/DL (ref 12–16)
HGB BLDV-MCNC: 11.1 G/DL (ref 12–16)
IMM GRANULOCYTES # BLD AUTO: 0.03 X10*3/UL (ref 0–0.5)
IMM GRANULOCYTES NFR BLD AUTO: 0.5 % (ref 0–0.9)
INHALED O2 CONCENTRATION: 21 %
LACTATE BLDV-SCNC: 2.4 MMOL/L (ref 0.4–2)
LACTATE BLDV-SCNC: 2.4 MMOL/L (ref 0.4–2)
LACTATE SERPL-SCNC: 2.2 MMOL/L (ref 0.4–2)
LACTATE SERPL-SCNC: 2.4 MMOL/L (ref 0.4–2)
LIPASE SERPL-CCNC: 10 U/L (ref 9–82)
LYMPHOCYTES # BLD AUTO: 1.65 X10*3/UL (ref 0.8–3)
LYMPHOCYTES NFR BLD AUTO: 25.1 %
MAGNESIUM SERPL-MCNC: 1.59 MG/DL (ref 1.6–2.4)
MCH RBC QN AUTO: 31 PG (ref 26–34)
MCHC RBC AUTO-ENTMCNC: 32.8 G/DL (ref 32–36)
MCV RBC AUTO: 94 FL (ref 80–100)
MONOCYTES # BLD AUTO: 0.44 X10*3/UL (ref 0.05–0.8)
MONOCYTES NFR BLD AUTO: 6.7 %
NEUTROPHILS # BLD AUTO: 4.17 X10*3/UL (ref 1.6–5.5)
NEUTROPHILS NFR BLD AUTO: 63.2 %
NRBC BLD-RTO: 0 /100 WBCS (ref 0–0)
OXYHGB MFR BLDV: 36.1 % (ref 45–75)
PCO2 BLDV: 53 MM HG (ref 41–51)
PH BLDV: 7.31 PH (ref 7.33–7.43)
PHOSPHATE SERPL-MCNC: 2.8 MG/DL (ref 2.5–4.9)
PLATELET # BLD AUTO: 158 X10*3/UL (ref 150–450)
PO2 BLDV: 25 MM HG (ref 35–45)
POTASSIUM BLDV-SCNC: 3.4 MMOL/L (ref 3.5–5.3)
POTASSIUM SERPL-SCNC: 3.5 MMOL/L (ref 3.5–5.3)
PROT SERPL-MCNC: 5.8 G/DL (ref 6.4–8.2)
RBC # BLD AUTO: 3.55 X10*6/UL (ref 4–5.2)
RH FACTOR (ANTIGEN D): NORMAL
RSV RNA RESP QL NAA+PROBE: NOT DETECTED
SAO2 % BLDV: 36 % (ref 45–75)
SARS-COV-2 RNA RESP QL NAA+PROBE: NOT DETECTED
SODIUM BLDV-SCNC: 142 MMOL/L (ref 136–145)
SODIUM SERPL-SCNC: 141 MMOL/L (ref 136–145)
TSH SERPL-ACNC: 2.35 MIU/L (ref 0.44–3.98)
UFH PPP CHRO-ACNC: 1.8 IU/ML
WBC # BLD AUTO: 6.6 X10*3/UL (ref 4.4–11.3)

## 2024-12-25 PROCEDURE — 71045 X-RAY EXAM CHEST 1 VIEW: CPT

## 2024-12-25 PROCEDURE — 71275 CT ANGIOGRAPHY CHEST: CPT | Performed by: RADIOLOGY

## 2024-12-25 PROCEDURE — 84075 ASSAY ALKALINE PHOSPHATASE: CPT | Performed by: EMERGENCY MEDICINE

## 2024-12-25 PROCEDURE — 96375 TX/PRO/DX INJ NEW DRUG ADDON: CPT | Mod: 59

## 2024-12-25 PROCEDURE — 93005 ELECTROCARDIOGRAM TRACING: CPT

## 2024-12-25 PROCEDURE — 71275 CT ANGIOGRAPHY CHEST: CPT

## 2024-12-25 PROCEDURE — 2500000005 HC RX 250 GENERAL PHARMACY W/O HCPCS

## 2024-12-25 PROCEDURE — 72125 CT NECK SPINE W/O DYE: CPT | Performed by: RADIOLOGY

## 2024-12-25 PROCEDURE — 84100 ASSAY OF PHOSPHORUS: CPT | Performed by: EMERGENCY MEDICINE

## 2024-12-25 PROCEDURE — 84484 ASSAY OF TROPONIN QUANT: CPT | Performed by: EMERGENCY MEDICINE

## 2024-12-25 PROCEDURE — 36415 COLL VENOUS BLD VENIPUNCTURE: CPT | Performed by: EMERGENCY MEDICINE

## 2024-12-25 PROCEDURE — 86900 BLOOD TYPING SEROLOGIC ABO: CPT | Performed by: EMERGENCY MEDICINE

## 2024-12-25 PROCEDURE — 74174 CTA ABD&PLVS W/CONTRAST: CPT | Performed by: RADIOLOGY

## 2024-12-25 PROCEDURE — 2020000001 HC ICU ROOM DAILY

## 2024-12-25 PROCEDURE — 70450 CT HEAD/BRAIN W/O DYE: CPT

## 2024-12-25 PROCEDURE — 2500000004 HC RX 250 GENERAL PHARMACY W/ HCPCS (ALT 636 FOR OP/ED): Performed by: EMERGENCY MEDICINE

## 2024-12-25 PROCEDURE — 87637 SARSCOV2&INF A&B&RSV AMP PRB: CPT | Performed by: EMERGENCY MEDICINE

## 2024-12-25 PROCEDURE — 83605 ASSAY OF LACTIC ACID: CPT | Performed by: EMERGENCY MEDICINE

## 2024-12-25 PROCEDURE — 83880 ASSAY OF NATRIURETIC PEPTIDE: CPT | Performed by: EMERGENCY MEDICINE

## 2024-12-25 PROCEDURE — 96361 HYDRATE IV INFUSION ADD-ON: CPT

## 2024-12-25 PROCEDURE — 83690 ASSAY OF LIPASE: CPT | Performed by: EMERGENCY MEDICINE

## 2024-12-25 PROCEDURE — 85730 THROMBOPLASTIN TIME PARTIAL: CPT | Performed by: EMERGENCY MEDICINE

## 2024-12-25 PROCEDURE — 83735 ASSAY OF MAGNESIUM: CPT | Performed by: EMERGENCY MEDICINE

## 2024-12-25 PROCEDURE — 96376 TX/PRO/DX INJ SAME DRUG ADON: CPT | Mod: 59

## 2024-12-25 PROCEDURE — 72125 CT NECK SPINE W/O DYE: CPT

## 2024-12-25 PROCEDURE — 82330 ASSAY OF CALCIUM: CPT | Performed by: EMERGENCY MEDICINE

## 2024-12-25 PROCEDURE — 87040 BLOOD CULTURE FOR BACTERIA: CPT | Mod: AHULAB | Performed by: EMERGENCY MEDICINE

## 2024-12-25 PROCEDURE — 85025 COMPLETE CBC W/AUTO DIFF WBC: CPT | Performed by: EMERGENCY MEDICINE

## 2024-12-25 PROCEDURE — 93308 TTE F-UP OR LMTD: CPT | Performed by: EMERGENCY MEDICINE

## 2024-12-25 PROCEDURE — 82947 ASSAY GLUCOSE BLOOD QUANT: CPT | Performed by: EMERGENCY MEDICINE

## 2024-12-25 PROCEDURE — 2550000001 HC RX 255 CONTRASTS: Performed by: EMERGENCY MEDICINE

## 2024-12-25 PROCEDURE — 70450 CT HEAD/BRAIN W/O DYE: CPT | Performed by: RADIOLOGY

## 2024-12-25 PROCEDURE — 99292 CRITICAL CARE ADDL 30 MIN: CPT | Mod: 25 | Performed by: EMERGENCY MEDICINE

## 2024-12-25 PROCEDURE — 96366 THER/PROPH/DIAG IV INF ADDON: CPT

## 2024-12-25 PROCEDURE — 71045 X-RAY EXAM CHEST 1 VIEW: CPT | Performed by: RADIOLOGY

## 2024-12-25 PROCEDURE — 96365 THER/PROPH/DIAG IV INF INIT: CPT | Mod: 59

## 2024-12-25 PROCEDURE — 85520 HEPARIN ASSAY: CPT | Performed by: EMERGENCY MEDICINE

## 2024-12-25 PROCEDURE — 84443 ASSAY THYROID STIM HORMONE: CPT | Performed by: EMERGENCY MEDICINE

## 2024-12-25 PROCEDURE — 2500000005 HC RX 250 GENERAL PHARMACY W/O HCPCS: Performed by: EMERGENCY MEDICINE

## 2024-12-25 RX ORDER — NOREPINEPHRINE BITARTRATE/D5W 8 MG/250ML
0-.2 PLASTIC BAG, INJECTION (ML) INTRAVENOUS CONTINUOUS
Status: DISCONTINUED | OUTPATIENT
Start: 2024-12-25 | End: 2024-12-26

## 2024-12-25 RX ORDER — MAGNESIUM SULFATE HEPTAHYDRATE 40 MG/ML
2 INJECTION, SOLUTION INTRAVENOUS ONCE
Status: COMPLETED | OUTPATIENT
Start: 2024-12-25 | End: 2024-12-25

## 2024-12-25 RX ORDER — HEPARIN SODIUM 10000 [USP'U]/100ML
0-4500 INJECTION, SOLUTION INTRAVENOUS CONTINUOUS
Status: DISCONTINUED | OUTPATIENT
Start: 2024-12-25 | End: 2024-12-27

## 2024-12-25 RX ORDER — HYDROCODONE BITARTRATE AND ACETAMINOPHEN 5; 325 MG/1; MG/1
1 TABLET ORAL EVERY 6 HOURS PRN
COMMUNITY
Start: 2024-09-13

## 2024-12-25 RX ORDER — NOREPINEPHRINE BITARTRATE/D5W 8 MG/250ML
PLASTIC BAG, INJECTION (ML) INTRAVENOUS
Status: COMPLETED
Start: 2024-12-25 | End: 2024-12-25

## 2024-12-25 RX ADMIN — SODIUM CHLORIDE 1000 ML: 9 INJECTION, SOLUTION INTRAVENOUS at 12:33

## 2024-12-25 RX ADMIN — SODIUM CHLORIDE 1000 ML: 9 INJECTION, SOLUTION INTRAVENOUS at 13:25

## 2024-12-25 RX ADMIN — MAGNESIUM SULFATE HEPTAHYDRATE 2 G: 40 INJECTION, SOLUTION INTRAVENOUS at 16:48

## 2024-12-25 RX ADMIN — IOHEXOL 75 ML: 350 INJECTION, SOLUTION INTRAVENOUS at 13:04

## 2024-12-25 RX ADMIN — Medication 0.2 MCG/KG/MIN: at 12:45

## 2024-12-25 RX ADMIN — VANCOMYCIN HYDROCHLORIDE 1500 MG: 5 INJECTION, POWDER, LYOPHILIZED, FOR SOLUTION INTRAVENOUS at 13:25

## 2024-12-25 RX ADMIN — HEPARIN SODIUM 1300 UNITS/HR: 10000 INJECTION, SOLUTION INTRAVENOUS at 15:28

## 2024-12-25 RX ADMIN — PIPERACILLIN SODIUM AND TAZOBACTAM SODIUM 4.5 G: 4; .5 INJECTION, SOLUTION INTRAVENOUS at 12:42

## 2024-12-25 RX ADMIN — NOREPINEPHRINE BITARTRATE 0.2 MCG/KG/MIN: 8 INJECTION, SOLUTION INTRAVENOUS at 12:45

## 2024-12-25 ASSESSMENT — PAIN SCALES - GENERAL
PAINLEVEL_OUTOF10: 0 - NO PAIN

## 2024-12-25 ASSESSMENT — PAIN - FUNCTIONAL ASSESSMENT: PAIN_FUNCTIONAL_ASSESSMENT: 0-10

## 2024-12-25 ASSESSMENT — PAIN DESCRIPTION - PROGRESSION: CLINICAL_PROGRESSION: NOT CHANGED

## 2024-12-25 NOTE — SIGNIFICANT EVENT
The patient has been identified as having an emergent need for administration of iodinated contrast for CT scan prior to result of laboratory studies OR despite known elevated GFR due to possibility of life and/or limb threatening pathology.    I acknowledge the risks and benefits of emergently proceeding with contrast administration including that, at present, it is the position of the [employer] that contrast induced nephropathy (SHELLI) is a rare but possible consequence. At this time the benefits of proceeding with contrast administration outweigh the risks.    Attempts will be made to mitigate possible SHELLI risk with IV fluid hydration if able.    Saran Barraza MD

## 2024-12-25 NOTE — ED PROCEDURE NOTE
Procedure    Performed by: Saran Barraza MD  Authorized by: Saran Barraza MD  Cardiac Indications: hypotension                Procedure: Cardiac Ultrasound    Findings:   Views: parasternal long, parasternal short and subxiphoid  The pericardial space was visualized and was NEGATIVE for a significant pericardial effusion.    LV: LV systolic function was NORMAL.      Impression:  Cardiac: The focused cardiac ultrasound exam was NORMAL.    Comments: Poor quality windows.  IVC appears small caliber with increased respiratory variation.               Saran Barraza MD  12/25/24 [id number]

## 2024-12-25 NOTE — CARE PLAN
"PULMONARY EMBOLISM RESPONSE TEAM (PERT) NOTE    This note represents a summary of a virtual evaluation by the pulmonary embolism response team requested by [facility]. Suggestions and impression are summarized from the initial virtual encounter and discussion with the referring medical team. These suggestions supplement but should not be a substitute for bedside evaluation and management by the attending of record.     PERT Members on the Call:    Critical Care Attending: Dr. Harika KIDD Interventional Cardiology Attending: Dr. Khanna  Vascular Medicine Attending: Dr. Nava      Brief Clinical Summary:    Nevaeh Fair is a 79 y.o. female with PMH of CABG 8/2024, subarachnoid hemorrage 2011, presenting with weakness and found to have right distal main PE with no RV strain.     Vital Signs  /75   Pulse 71   Temp 36.3 °C (97.4 °F) (Oral)   Resp 16   Ht 1.651 m (5' 5\")   Wt 70.1 kg (154 lb 8.7 oz)   SpO2 100%   BMI 25.72 kg/m²      Laboratory  Recent Labs     12/25/24  1456 12/25/24  1231 12/25/24  1228   TROPHS  --  11 10   LACTATE 2.4*  --  2.2*         PESI Score Edy KEITH Et al. Arch Intern Med. 2010;[id number].           PESI Score:   Low risk    CT Scan reviewed:  Clot burden moderate present in Right distal main and right lower lobar   RV/LV ratio <1 by CT scan    TTE reviewed (if available):  NA    Venous duplex (if available):    NA    Contraindications to anticoagulation:No  Contraindications to thrombolytics: No    Initial Impressions:  ERS/ESC Pulmonary Embolism Risk Category: JLPECLASS: Low risk.      Initial Suggestions/Plans:    Admit to ICU at [facility]   Initial therapy suggested: Heparin drip  Additional testing recommended: echo, DVT ultrasound, trend markers   No indication for intervention     To re conference the PERT team please call the  Transfer Center at [phone].   "

## 2024-12-25 NOTE — ED PROVIDER NOTES
No chief complaint on file.    History of Present Illness   Nevaeh Fair   MRN [id number]    79-year-old presents for evaluation of severe fatigue and sleeping more than usual.  Found to be hypotensive systolic blood pressure 60 in triage.  Patient reports no headache, chest pain, abdominal pain, nausea or vomiting, back pain, extremity weakness or numbness.  Described chills without measured fever and shortness of breath for the past 1 to 2 days.  Returned from a trip to [place] 2 days ago.  Describes chronic medical conditions including multivessel coronary artery disease status post CABG for which she takes Plavix.  Does not believe that she has heart failure.    Reviewed progress note from PCP on 11/5/2024.  Describes shortness of breath with exertion, CABG x 3 on 8/9/2024 at [facility], postop complication left apical pneumothorax, diabetes, anxiety, insomnia, depression, hypertension, chronic back pain secondary to lumbar herniated disc.  Subarachnoid hemorrhage in 2011.    Physical Exam   T 36.3 °C (97.4 °F)  HR 66  BP (!) 60/38  RR 16  O2 96 % None (Room air)    General: Appears fatigued.  Head: Atraumatic.  Eyes: No conjunctival injection.   Ears Nose Throat: No epistaxis. Oral mucosa dry.  Neck: Supple.  Cardiovascular: Extremities warm.  Regular rhythm.  No JVD.  No significant lower leg edema.  Pulmonary: No stridor. Normal respiratory effort.  Lungs clear.  No conversational dyspnea on room air.  Abdominal: No distention.  Soft.  No tenderness to palpation.  Musculoskeletal: No deformity or joint swelling.  Skin: No rash.  Psychiatric: Does not appear to respond to internal stimuli.  Neurologic: Alert. Follows directions. Face symmetric. No aphasia or dysarthria.  Strength 5/5 upper and lower extremities without drift.  Sensation to light touch intact.    ED Course & Medical Decision Making   Initial vital signs notable for significant hypotension blood pressure 60/38.  Given nonspecific symptoms and  report of chills and shortness of breath suspected sepsis and administered IV fluid boluses 2 L, collected cultures, administered empiric broad-spectrum antibiotics.  Point-of-care ultrasound demonstrated preserved LV systolic function and no pericardial effusion and small caliber IVC with increased respiratory variation.  Given recent travel, shortness of breath, and new oxygen requirement ordered CT angio chest abdomen pelvis to exclude pulmonary emboli or acute aortic syndrome.  Norepinephrine through peripheral IV started given limited response to intravenous fluids with maximum dose 0.2 mcg/kg/min.     CT notable for pulmonary emboli involving the right distal main pulmonary artery extending into right lower lobe and right upper lobar branches associated with borderline right heart strain pattern.  Extensive reticular infiltrates of bilateral right and left lower lobes, right middle lobe, and right upper lobe may be seen with aspiration or nonspecific pneumonitis.  Incidental finding abnormal dilatation of main pancreatic duct up to 10 mm and extensive atherosclerotic changes of the aortoiliac axis without aneurysmal dilatation.    Suspect shock due to commendation of pulmonary emboli, hypovolemia, and pneumonia.  Receiving intravenous fluids, broad-spectrum antibiotics, and heparin infusion.  Mental status and peripheral perfusion improved on repeat examination after intravenous fluid resuscitation and while on norepinephrine 0.05 mcg/kg/min.  Critically ill and admitted to ICU.    ED Course as of 12/25/24 1720   Wed Dec 25, 2024   1530 Discussed with MICU fellow via transfer center part conference call given relative contraindications to thrombolysis given advanced age and subarachnoid hemorrhage in 2011. [MC]   1638 PERT recommended heparin and no other acute interventions. [MC]   1655 Reassessed patient and discussed results with her and family at bedside.  Blood pressure 105/60 on norepinephrine 0.05  mcg/min. []      ED Course User Index  [] Saran Barraza MD         Diagnoses as of 12/25/24 1720   Acute pulmonary embolism, unspecified pulmonary embolism type, unspecified whether acute cor pulmonale present (Multi)   NICOLA (acute kidney injury) (CMS-HCC)   Hypomagnesemia   Dilation of pancreatic duct ([facility]-HCC)            Saran Barraza MD  12/25/24 6425

## 2024-12-25 NOTE — ED PROCEDURE NOTE
Procedure  Critical Care    Performed by: Saran Barraza MD  Authorized by: Saran Barraza MD    Critical care provider statement:     Critical care time (minutes):  85    Critical care time was exclusive of:  Separately billable procedures and treating other patients and teaching time    Critical care was necessary to treat or prevent imminent or life-threatening deterioration of the following conditions:  Circulatory failure, respiratory failure and shock    Critical care was time spent personally by me on the following activities:  Evaluation of patient's response to treatment, examination of patient, obtaining history from patient or surrogate, ordering and performing treatments and interventions, ordering and review of laboratory studies, ordering and review of radiographic studies, pulse oximetry, re-evaluation of patient's condition, discussions with consultants and review of old charts    Care discussed with: admitting provider                 Saran Barraza MD  12/25/24 0340

## 2024-12-26 ENCOUNTER — APPOINTMENT (OUTPATIENT)
Dept: CARDIOLOGY | Facility: HOSPITAL | Age: 79
DRG: 175 | End: 2024-12-26
Payer: MEDICARE

## 2024-12-26 ENCOUNTER — DOCUMENTATION (OUTPATIENT)
Dept: RESEARCH | Age: 79
End: 2024-12-26

## 2024-12-26 LAB
ALBUMIN SERPL BCP-MCNC: 3.4 G/DL (ref 3.4–5)
ANION GAP SERPL CALC-SCNC: 12 MMOL/L (ref 10–20)
APPEARANCE UR: CLEAR
BILIRUB UR STRIP.AUTO-MCNC: NEGATIVE MG/DL
BUN SERPL-MCNC: 7 MG/DL (ref 6–23)
CALCIUM SERPL-MCNC: 8.8 MG/DL (ref 8.6–10.3)
CHLORIDE SERPL-SCNC: 109 MMOL/L (ref 98–107)
CO2 SERPL-SCNC: 25 MMOL/L (ref 21–32)
COLOR UR: ABNORMAL
CREAT SERPL-MCNC: 0.82 MG/DL (ref 0.5–1.05)
EGFRCR SERPLBLD CKD-EPI 2021: 72 ML/MIN/1.73M*2
EJECTION FRACTION APICAL 4 CHAMBER: 50
EJECTION FRACTION: 57 %
ERYTHROCYTE [DISTWIDTH] IN BLOOD BY AUTOMATED COUNT: 14.9 % (ref 11.5–14.5)
GLUCOSE SERPL-MCNC: 131 MG/DL (ref 74–99)
GLUCOSE UR STRIP.AUTO-MCNC: ABNORMAL MG/DL
HCT VFR BLD AUTO: 35 % (ref 36–46)
HGB BLD-MCNC: 11.8 G/DL (ref 12–16)
KETONES UR STRIP.AUTO-MCNC: NEGATIVE MG/DL
LACTATE SERPL-SCNC: 2.1 MMOL/L (ref 0.4–2)
LACTATE SERPL-SCNC: 2.3 MMOL/L (ref 0.4–2)
LEFT VENTRICLE INTERNAL DIMENSION DIASTOLE: 2.82 CM (ref 3.5–6)
LEUKOCYTE ESTERASE UR QL STRIP.AUTO: ABNORMAL
MCH RBC QN AUTO: 30.9 PG (ref 26–34)
MCHC RBC AUTO-ENTMCNC: 33.7 G/DL (ref 32–36)
MCV RBC AUTO: 92 FL (ref 80–100)
MITRAL VALVE E/A RATIO: 1.47
NITRITE UR QL STRIP.AUTO: NEGATIVE
NRBC BLD-RTO: 0 /100 WBCS (ref 0–0)
PH UR STRIP.AUTO: 5.5 [PH]
PHOSPHATE SERPL-MCNC: 2.3 MG/DL (ref 2.5–4.9)
PLATELET # BLD AUTO: 146 X10*3/UL (ref 150–450)
POTASSIUM SERPL-SCNC: 3.5 MMOL/L (ref 3.5–5.3)
PROT UR STRIP.AUTO-MCNC: ABNORMAL MG/DL
RBC # BLD AUTO: 3.82 X10*6/UL (ref 4–5.2)
RBC # UR STRIP.AUTO: ABNORMAL /UL
RBC #/AREA URNS AUTO: NORMAL /HPF
RIGHT VENTRICLE FREE WALL PEAK S': 7.52 CM/S
RIGHT VENTRICLE PEAK SYSTOLIC PRESSURE: 33.7 MMHG
SODIUM SERPL-SCNC: 142 MMOL/L (ref 136–145)
SP GR UR STRIP.AUTO: >1.05
TRICUSPID ANNULAR PLANE SYSTOLIC EXCURSION: 1.8 CM
UFH PPP CHRO-ACNC: 0.1 IU/ML
UFH PPP CHRO-ACNC: 0.6 IU/ML
UFH PPP CHRO-ACNC: 0.7 IU/ML
UROBILINOGEN UR STRIP.AUTO-MCNC: NORMAL MG/DL
WBC # BLD AUTO: 4.5 X10*3/UL (ref 4.4–11.3)
WBC #/AREA URNS AUTO: NORMAL /HPF

## 2024-12-26 PROCEDURE — 2500000002 HC RX 250 W HCPCS SELF ADMINISTERED DRUGS (ALT 637 FOR MEDICARE OP, ALT 636 FOR OP/ED): Performed by: HOSPITALIST

## 2024-12-26 PROCEDURE — 93308 TTE F-UP OR LMTD: CPT | Performed by: INTERNAL MEDICINE

## 2024-12-26 PROCEDURE — 85520 HEPARIN ASSAY: CPT | Performed by: HOSPITALIST

## 2024-12-26 PROCEDURE — 87081 CULTURE SCREEN ONLY: CPT | Mod: AHULAB | Performed by: EMERGENCY MEDICINE

## 2024-12-26 PROCEDURE — 93325 DOPPLER ECHO COLOR FLOW MAPG: CPT | Performed by: INTERNAL MEDICINE

## 2024-12-26 PROCEDURE — 85027 COMPLETE CBC AUTOMATED: CPT | Performed by: NURSE PRACTITIONER

## 2024-12-26 PROCEDURE — 36415 COLL VENOUS BLD VENIPUNCTURE: CPT | Performed by: NURSE PRACTITIONER

## 2024-12-26 PROCEDURE — 2500000004 HC RX 250 GENERAL PHARMACY W/ HCPCS (ALT 636 FOR OP/ED): Performed by: NURSE PRACTITIONER

## 2024-12-26 PROCEDURE — 2500000004 HC RX 250 GENERAL PHARMACY W/ HCPCS (ALT 636 FOR OP/ED): Performed by: HOSPITALIST

## 2024-12-26 PROCEDURE — 2500000004 HC RX 250 GENERAL PHARMACY W/ HCPCS (ALT 636 FOR OP/ED): Performed by: EMERGENCY MEDICINE

## 2024-12-26 PROCEDURE — 99291 CRITICAL CARE FIRST HOUR: CPT | Performed by: NURSE PRACTITIONER

## 2024-12-26 PROCEDURE — 2500000001 HC RX 250 WO HCPCS SELF ADMINISTERED DRUGS (ALT 637 FOR MEDICARE OP): Performed by: EMERGENCY MEDICINE

## 2024-12-26 PROCEDURE — 36415 COLL VENOUS BLD VENIPUNCTURE: CPT | Performed by: ANESTHESIOLOGY

## 2024-12-26 PROCEDURE — 93321 DOPPLER ECHO F-UP/LMTD STD: CPT | Performed by: INTERNAL MEDICINE

## 2024-12-26 PROCEDURE — 2500000001 HC RX 250 WO HCPCS SELF ADMINISTERED DRUGS (ALT 637 FOR MEDICARE OP): Performed by: NURSE PRACTITIONER

## 2024-12-26 PROCEDURE — 83605 ASSAY OF LACTIC ACID: CPT | Performed by: NURSE PRACTITIONER

## 2024-12-26 PROCEDURE — 93308 TTE F-UP OR LMTD: CPT

## 2024-12-26 PROCEDURE — 2020000001 HC ICU ROOM DAILY

## 2024-12-26 PROCEDURE — 81001 URINALYSIS AUTO W/SCOPE: CPT | Performed by: EMERGENCY MEDICINE

## 2024-12-26 PROCEDURE — 87086 URINE CULTURE/COLONY COUNT: CPT | Mod: AHULAB | Performed by: EMERGENCY MEDICINE

## 2024-12-26 PROCEDURE — 80069 RENAL FUNCTION PANEL: CPT | Performed by: NURSE PRACTITIONER

## 2024-12-26 PROCEDURE — 85520 HEPARIN ASSAY: CPT | Performed by: ANESTHESIOLOGY

## 2024-12-26 PROCEDURE — 2500000001 HC RX 250 WO HCPCS SELF ADMINISTERED DRUGS (ALT 637 FOR MEDICARE OP): Performed by: HOSPITALIST

## 2024-12-26 RX ORDER — POLYETHYLENE GLYCOL 3350 17 G/17G
17 POWDER, FOR SOLUTION ORAL DAILY
Status: DISCONTINUED | OUTPATIENT
Start: 2024-12-26 | End: 2024-12-28 | Stop reason: HOSPADM

## 2024-12-26 RX ORDER — ROSUVASTATIN CALCIUM 20 MG/1
40 TABLET, COATED ORAL NIGHTLY
Status: DISCONTINUED | OUTPATIENT
Start: 2024-12-26 | End: 2024-12-28 | Stop reason: HOSPADM

## 2024-12-26 RX ORDER — LOPERAMIDE HYDROCHLORIDE 2 MG/1
2 CAPSULE ORAL 4 TIMES DAILY PRN
Status: DISCONTINUED | OUTPATIENT
Start: 2024-12-26 | End: 2024-12-28 | Stop reason: HOSPADM

## 2024-12-26 RX ORDER — MAGNESIUM SULFATE HEPTAHYDRATE 40 MG/ML
2 INJECTION, SOLUTION INTRAVENOUS ONCE
Status: COMPLETED | OUTPATIENT
Start: 2024-12-26 | End: 2024-12-26

## 2024-12-26 RX ORDER — VENLAFAXINE HYDROCHLORIDE 37.5 MG/1
37.5 CAPSULE, EXTENDED RELEASE ORAL DAILY
Status: DISCONTINUED | OUTPATIENT
Start: 2024-12-26 | End: 2024-12-26

## 2024-12-26 RX ORDER — CLOPIDOGREL BISULFATE 75 MG/1
75 TABLET ORAL DAILY
Status: DISCONTINUED | OUTPATIENT
Start: 2024-12-26 | End: 2024-12-28 | Stop reason: HOSPADM

## 2024-12-26 RX ORDER — HYDROCODONE BITARTRATE AND ACETAMINOPHEN 5; 325 MG/1; MG/1
1 TABLET ORAL EVERY 4 HOURS PRN
Status: DISCONTINUED | OUTPATIENT
Start: 2024-12-26 | End: 2024-12-28 | Stop reason: HOSPADM

## 2024-12-26 RX ORDER — VANCOMYCIN HYDROCHLORIDE 1 G/20ML
INJECTION, POWDER, LYOPHILIZED, FOR SOLUTION INTRAVENOUS DAILY PRN
Status: DISCONTINUED | OUTPATIENT
Start: 2024-12-26 | End: 2024-12-26 | Stop reason: DRUGHIGH

## 2024-12-26 RX ORDER — AZITHROMYCIN 500 MG/1
500 TABLET, FILM COATED ORAL ONCE
Status: COMPLETED | OUTPATIENT
Start: 2024-12-26 | End: 2024-12-26

## 2024-12-26 RX ORDER — PANTOPRAZOLE SODIUM 40 MG/1
40 TABLET, DELAYED RELEASE ORAL
Status: DISCONTINUED | OUTPATIENT
Start: 2024-12-26 | End: 2024-12-28 | Stop reason: HOSPADM

## 2024-12-26 RX ADMIN — CLOPIDOGREL 75 MG: 75 TABLET ORAL at 09:13

## 2024-12-26 RX ADMIN — ROSUVASTATIN 40 MG: 20 TABLET, FILM COATED ORAL at 20:11

## 2024-12-26 RX ADMIN — HYDROCODONE BITARTRATE AND ACETAMINOPHEN 1 TABLET: 5; 325 TABLET ORAL at 20:10

## 2024-12-26 RX ADMIN — QUETIAPINE FUMARATE 75 MG: 50 TABLET ORAL at 20:11

## 2024-12-26 RX ADMIN — LOPERAMIDE HYDROCHLORIDE 2 MG: 2 CAPSULE ORAL at 12:17

## 2024-12-26 RX ADMIN — LOPERAMIDE HYDROCHLORIDE 2 MG: 2 CAPSULE ORAL at 20:11

## 2024-12-26 RX ADMIN — LOPERAMIDE HYDROCHLORIDE 2 MG: 2 CAPSULE ORAL at 16:00

## 2024-12-26 RX ADMIN — MAGNESIUM SULFATE HEPTAHYDRATE 2 G: 40 INJECTION, SOLUTION INTRAVENOUS at 09:13

## 2024-12-26 RX ADMIN — AZITHROMYCIN DIHYDRATE 500 MG: 500 TABLET ORAL at 03:03

## 2024-12-26 RX ADMIN — HYDROCODONE BITARTRATE AND ACETAMINOPHEN 1 TABLET: 5; 325 TABLET ORAL at 06:24

## 2024-12-26 RX ADMIN — HYDROCODONE BITARTRATE AND ACETAMINOPHEN 1 TABLET: 5; 325 TABLET ORAL at 16:00

## 2024-12-26 RX ADMIN — PIPERACILLIN SODIUM AND TAZOBACTAM SODIUM 3.38 G: 3; .375 INJECTION, SOLUTION INTRAVENOUS at 09:13

## 2024-12-26 RX ADMIN — PANTOPRAZOLE SODIUM 40 MG: 40 TABLET, DELAYED RELEASE ORAL at 06:24

## 2024-12-26 RX ADMIN — HEPARIN SODIUM 1500 UNITS/HR: 10000 INJECTION, SOLUTION INTRAVENOUS at 03:28

## 2024-12-26 RX ADMIN — HEPARIN SODIUM 1500 UNITS/HR: 10000 INJECTION, SOLUTION INTRAVENOUS at 12:41

## 2024-12-26 RX ADMIN — PIPERACILLIN SODIUM AND TAZOBACTAM SODIUM 3.38 G: 3; .375 INJECTION, SOLUTION INTRAVENOUS at 03:22

## 2024-12-26 SDOH — SOCIAL STABILITY: SOCIAL INSECURITY: WERE YOU ABLE TO COMPLETE ALL THE BEHAVIORAL HEALTH SCREENINGS?: YES

## 2024-12-26 SDOH — ECONOMIC STABILITY: FOOD INSECURITY: WITHIN THE PAST 12 MONTHS, THE FOOD YOU BOUGHT JUST DIDN'T LAST AND YOU DIDN'T HAVE MONEY TO GET MORE.: NEVER TRUE

## 2024-12-26 SDOH — SOCIAL STABILITY: SOCIAL INSECURITY: ARE YOU OR HAVE YOU BEEN THREATENED OR ABUSED PHYSICALLY, EMOTIONALLY, OR SEXUALLY BY ANYONE?: NO

## 2024-12-26 SDOH — ECONOMIC STABILITY: HOUSING INSECURITY: AT ANY TIME IN THE PAST 12 MONTHS, WERE YOU HOMELESS OR LIVING IN A SHELTER (INCLUDING NOW)?: NO

## 2024-12-26 SDOH — ECONOMIC STABILITY: FOOD INSECURITY: WITHIN THE PAST 12 MONTHS, YOU WORRIED THAT YOUR FOOD WOULD RUN OUT BEFORE YOU GOT THE MONEY TO BUY MORE.: NEVER TRUE

## 2024-12-26 SDOH — ECONOMIC STABILITY: HOUSING INSECURITY: IN THE LAST 12 MONTHS, WAS THERE A TIME WHEN YOU WERE NOT ABLE TO PAY THE MORTGAGE OR RENT ON TIME?: NO

## 2024-12-26 SDOH — ECONOMIC STABILITY: FOOD INSECURITY: HOW HARD IS IT FOR YOU TO PAY FOR THE VERY BASICS LIKE FOOD, HOUSING, MEDICAL CARE, AND HEATING?: NOT HARD AT ALL

## 2024-12-26 SDOH — SOCIAL STABILITY: SOCIAL INSECURITY: HAVE YOU HAD THOUGHTS OF HARMING ANYONE ELSE?: NO

## 2024-12-26 SDOH — ECONOMIC STABILITY: INCOME INSECURITY: IN THE PAST 12 MONTHS HAS THE ELECTRIC, GAS, OIL, OR WATER COMPANY THREATENED TO SHUT OFF SERVICES IN YOUR HOME?: NO

## 2024-12-26 SDOH — SOCIAL STABILITY: SOCIAL INSECURITY
WITHIN THE LAST YEAR, HAVE YOU BEEN KICKED, HIT, SLAPPED, OR OTHERWISE PHYSICALLY HURT BY YOUR PARTNER OR EX-PARTNER?: NO

## 2024-12-26 SDOH — SOCIAL STABILITY: SOCIAL INSECURITY
WITHIN THE LAST YEAR, HAVE YOU BEEN RAPED OR FORCED TO HAVE ANY KIND OF SEXUAL ACTIVITY BY YOUR PARTNER OR EX-PARTNER?: NO

## 2024-12-26 SDOH — SOCIAL STABILITY: SOCIAL INSECURITY: WITHIN THE LAST YEAR, HAVE YOU BEEN AFRAID OF YOUR PARTNER OR EX-PARTNER?: NO

## 2024-12-26 SDOH — SOCIAL STABILITY: SOCIAL INSECURITY: WITHIN THE LAST YEAR, HAVE YOU BEEN HUMILIATED OR EMOTIONALLY ABUSED IN OTHER WAYS BY YOUR PARTNER OR EX-PARTNER?: NO

## 2024-12-26 SDOH — SOCIAL STABILITY: SOCIAL INSECURITY: HAVE YOU HAD ANY THOUGHTS OF HARMING ANYONE ELSE?: NO

## 2024-12-26 SDOH — ECONOMIC STABILITY: TRANSPORTATION INSECURITY: IN THE PAST 12 MONTHS, HAS LACK OF TRANSPORTATION KEPT YOU FROM MEDICAL APPOINTMENTS OR FROM GETTING MEDICATIONS?: NO

## 2024-12-26 SDOH — SOCIAL STABILITY: SOCIAL INSECURITY: ABUSE: ADULT

## 2024-12-26 SDOH — SOCIAL STABILITY: SOCIAL INSECURITY: DO YOU FEEL UNSAFE GOING BACK TO THE PLACE WHERE YOU ARE LIVING?: NO

## 2024-12-26 SDOH — SOCIAL STABILITY: SOCIAL INSECURITY: ARE THERE ANY APPARENT SIGNS OF INJURIES/BEHAVIORS THAT COULD BE RELATED TO ABUSE/NEGLECT?: NO

## 2024-12-26 SDOH — SOCIAL STABILITY: SOCIAL INSECURITY: HAS ANYONE EVER THREATENED TO HURT YOUR FAMILY OR YOUR PETS?: NO

## 2024-12-26 SDOH — ECONOMIC STABILITY: HOUSING INSECURITY: IN THE PAST 12 MONTHS, HOW MANY TIMES HAVE YOU MOVED WHERE YOU WERE LIVING?: 0

## 2024-12-26 SDOH — SOCIAL STABILITY: SOCIAL INSECURITY: DO YOU FEEL ANYONE HAS EXPLOITED OR TAKEN ADVANTAGE OF YOU FINANCIALLY OR OF YOUR PERSONAL PROPERTY?: NO

## 2024-12-26 SDOH — SOCIAL STABILITY: SOCIAL INSECURITY: DOES ANYONE TRY TO KEEP YOU FROM HAVING/CONTACTING OTHER FRIENDS OR DOING THINGS OUTSIDE YOUR HOME?: NO

## 2024-12-26 ASSESSMENT — ENCOUNTER SYMPTOMS
DYSURIA: 0
CHILLS: 1
WHEEZING: 0
CONSTIPATION: 0
ACTIVITY CHANGE: 0
SORE THROAT: 0
NUMBNESS: 0
FLANK PAIN: 0
COUGH: 0
COLOR CHANGE: 0
DIARRHEA: 0
SHORTNESS OF BREATH: 0
ABDOMINAL DISTENTION: 0
ABDOMINAL PAIN: 0
FEVER: 0
DIZZINESS: 0
CHEST TIGHTNESS: 0
APPETITE CHANGE: 0
HEADACHES: 0
PALPITATIONS: 0
VOMITING: 0
DIFFICULTY URINATING: 0
SEIZURES: 0

## 2024-12-26 ASSESSMENT — LIFESTYLE VARIABLES
HOW OFTEN DO YOU HAVE 6 OR MORE DRINKS ON ONE OCCASION: NEVER
AUDIT-C TOTAL SCORE: 0
AUDIT-C TOTAL SCORE: 0
HOW OFTEN DO YOU HAVE A DRINK CONTAINING ALCOHOL: NEVER
SKIP TO QUESTIONS 9-10: 1
HOW MANY STANDARD DRINKS CONTAINING ALCOHOL DO YOU HAVE ON A TYPICAL DAY: PATIENT DOES NOT DRINK

## 2024-12-26 ASSESSMENT — PAIN SCALES - GENERAL
PAINLEVEL_OUTOF10: 7
PAINLEVEL_OUTOF10: 3
PAINLEVEL_OUTOF10: 10 - WORST POSSIBLE PAIN
PAINLEVEL_OUTOF10: 3

## 2024-12-26 ASSESSMENT — COGNITIVE AND FUNCTIONAL STATUS - GENERAL
DAILY ACTIVITIY SCORE: 24
MOBILITY SCORE: 24
DAILY ACTIVITIY SCORE: 24
PATIENT BASELINE BEDBOUND: NO
DAILY ACTIVITIY SCORE: 24
MOBILITY SCORE: 24
MOBILITY SCORE: 24

## 2024-12-26 ASSESSMENT — ACTIVITIES OF DAILY LIVING (ADL)
TOILETING: INDEPENDENT
LACK_OF_TRANSPORTATION: NO
JUDGMENT_ADEQUATE_SAFELY_COMPLETE_DAILY_ACTIVITIES: YES
FEEDING YOURSELF: INDEPENDENT
ASSISTIVE_DEVICE: OTHER (COMMENT)
EFFECT OF PAIN ON DAILY ACTIVITIES: COMFORT
ADEQUATE_TO_COMPLETE_ADL: YES
HEARING - RIGHT EAR: FUNCTIONAL
LACK_OF_TRANSPORTATION: NO
HEARING - LEFT EAR: FUNCTIONAL
DRESSING YOURSELF: INDEPENDENT
BATHING: INDEPENDENT
GROOMING: INDEPENDENT
WALKS IN HOME: INDEPENDENT
PATIENT'S MEMORY ADEQUATE TO SAFELY COMPLETE DAILY ACTIVITIES?: YES

## 2024-12-26 ASSESSMENT — PAIN - FUNCTIONAL ASSESSMENT
PAIN_FUNCTIONAL_ASSESSMENT: 0-10

## 2024-12-26 ASSESSMENT — PATIENT HEALTH QUESTIONNAIRE - PHQ9
1. LITTLE INTEREST OR PLEASURE IN DOING THINGS: NOT AT ALL
2. FEELING DOWN, DEPRESSED OR HOPELESS: NOT AT ALL
SUM OF ALL RESPONSES TO PHQ9 QUESTIONS 1 & 2: 0

## 2024-12-26 ASSESSMENT — PAIN DESCRIPTION - DESCRIPTORS: DESCRIPTORS: DULL

## 2024-12-26 NOTE — H&P
History Of Present Illness  Nevaeh Fair is a 79 y.o. female with history of anxiety, CAD s/p CABG 8/2024, and HTN.  Ad[place] to [facility] [place] with complaints of chills, fever, and hypotension.  Per patient she was on an airplane recently from [place].  ER work up revealed a large pulmonary embolus involving the right distal main pulmonary artery extending into the RLL an RUL branches and associated with right heart strain.  Additional work up showed Cl109, phos 2.3, H&H11.8, 35.0, plt 146, chest xray negative for any acute process.  UA with 25Leu/nL, no bacteria, and 1-5WBCs.  Patient started on heparin infusion and then transferred to ICU for ongoing management of acute pulmonary embolism.     Past Medical History  Past Medical History:   Diagnosis Date    Abdominal cramping     Anxiety     Coronary artery disease     Familial hypercholesterolemia 04/17/2014    Essential familial hypercholesterolemia    Hypertension     Presence of intraocular lens 07/10/2014    Pseudophakia, left eye       Surgical History  Past Surgical History:   Procedure Laterality Date    CARDIAC CATHETERIZATION N/A 07/10/2024    Procedure: Left Heart Cath with Coronary Angiography and LV;  Surgeon: Aditya Trejo MD;  Location: [facility] Cardiac Cath Lab;  Service: Cardiovascular;  Laterality: N/A;  7/10/24; 930 am for [facility] [address], CAD with angina I25.119 and hx stent Z98.61  [facility] medicare:  auth # [id number] 7/4/24-08/18/24    CATARACT EXTRACTION      COLONOSCOPY      ESOPHAGOGASTRODUODENOSCOPY          Social History  She reports that she has quit smoking. Her smoking use included cigarettes. She has never used smokeless tobacco. She reports that she does not drink alcohol and does not use drugs.    Family History  Family History   Problem Relation Name Age of Onset    Heart disease Mother      Coronary artery disease Mother      Heart disease Father      Heart attack Father      Hypertension Sister          Allergies  Patient has no known  "allergies.    Review of Systems   Constitutional:  Positive for chills. Negative for activity change, appetite change and fever.   HENT:  Negative for congestion, nosebleeds and sore throat.    Respiratory:  Negative for cough, chest tightness, shortness of breath and wheezing.    Cardiovascular:  Negative for chest pain and palpitations.   Gastrointestinal:  Negative for abdominal distention, abdominal pain, constipation, diarrhea and vomiting.   Genitourinary:  Negative for difficulty urinating, dysuria and flank pain.   Skin:  Negative for color change and rash.   Neurological:  Negative for dizziness, seizures, syncope, numbness and headaches.        Physical Exam  Constitutional:       General: She is not in acute distress.     Appearance: She is not ill-appearing.   Cardiovascular:      Rate and Rhythm: Normal rate and regular rhythm.      Pulses: Normal pulses.   Pulmonary:      Effort: No respiratory distress.      Breath sounds: Normal breath sounds. No wheezing.   Abdominal:      General: Bowel sounds are normal. There is no distension.      Palpations: Abdomen is soft.   Musculoskeletal:         General: Normal range of motion.   Skin:     General: Skin is warm and dry.      Capillary Refill: Capillary refill takes less than 2 seconds.   Neurological:      General: No focal deficit present.      Mental Status: She is alert and oriented to person, place, and time. Mental status is at baseline.   Psychiatric:         Mood and Affect: Mood normal.          Last Recorded Vitals  Blood pressure 150/73, pulse 80, temperature 36.4 °C (97.5 °F), temperature source Temporal, resp. rate 17, height 1.651 m (5' 5\"), weight 68.5 kg (151 lb 0.2 oz), SpO2 99%.    Relevant Results  Scheduled medications  clopidogrel, 75 mg, oral, Daily  pantoprazole, 40 mg, oral, Daily before breakfast  piperacillin-tazobactam, 3.375 g, intravenous, q6h  QUEtiapine, 75 mg, oral, Nightly  rosuvastatin, 40 mg, oral, Nightly  vancomycin, " 1,500 mg, intravenous, Once  venlafaxine XR, 37.5 mg, oral, Daily      Continuous medications  heparin, 0-4,500 Units/hr, Last Rate: 1,500 Units/hr (12/26/24 0085)  norepinephrine, 0-0.2 mcg/kg/min, Last Rate: 0.03 mcg/kg/min (12/26/24 9238)      PRN medications  PRN medications: heparin, HYDROcodone-acetaminophen    Results for orders placed or performed during the hospital encounter of 12/25/24 (from the past 24 hours)   CBC and Auto Differential   Result Value Ref Range    WBC 6.6 4.4 - 11.3 x10*3/uL    nRBC 0.0 0.0 - 0.0 /100 WBCs    RBC 3.55 (L) 4.00 - 5.20 x10*6/uL    Hemoglobin 11.0 (L) 12.0 - 16.0 g/dL    Hematocrit 33.5 (L) 36.0 - 46.0 %    MCV 94 80 - 100 fL    MCH 31.0 26.0 - 34.0 pg    MCHC 32.8 32.0 - 36.0 g/dL    RDW 15.5 (H) 11.5 - 14.5 %    Platelets 158 150 - 450 x10*3/uL    Neutrophils % 63.2 40.0 - 80.0 %    Immature Granulocytes %, Automated 0.5 0.0 - 0.9 %    Lymphocytes % 25.1 13.0 - 44.0 %    Monocytes % 6.7 2.0 - 10.0 %    Eosinophils % 4.3 0.0 - 6.0 %    Basophils % 0.2 0.0 - 2.0 %    Neutrophils Absolute 4.17 1.60 - 5.50 x10*3/uL    Immature Granulocytes Absolute, Automated 0.03 0.00 - 0.50 x10*3/uL    Lymphocytes Absolute 1.65 0.80 - 3.00 x10*3/uL    Monocytes Absolute 0.44 0.05 - 0.80 x10*3/uL    Eosinophils Absolute 0.28 0.00 - 0.40 x10*3/uL    Basophils Absolute 0.01 0.00 - 0.10 x10*3/uL   Basic metabolic panel   Result Value Ref Range    Glucose 161 (H) 74 - 99 mg/dL    Sodium 141 136 - 145 mmol/L    Potassium 3.5 3.5 - 5.3 mmol/L    Chloride 109 (H) 98 - 107 mmol/L    Bicarbonate 23 21 - 32 mmol/L    Anion Gap 13 10 - 20 mmol/L    Urea Nitrogen 12 6 - 23 mg/dL    Creatinine 1.21 (H) 0.50 - 1.05 mg/dL    eGFR 45 (L) >60 mL/min/1.73m*2    Calcium 7.9 (L) 8.6 - 10.3 mg/dL   Phosphorus   Result Value Ref Range    Phosphorus 2.8 2.5 - 4.9 mg/dL   Magnesium   Result Value Ref Range    Magnesium 1.59 (L) 1.60 - 2.40 mg/dL   Hepatic function panel   Result Value Ref Range    Albumin 3.0 (L)  3.4 - 5.0 g/dL    Bilirubin, Total 0.7 0.0 - 1.2 mg/dL    Bilirubin, Direct 0.0 0.0 - 0.3 mg/dL    Alkaline Phosphatase 80 33 - 136 U/L    ALT 9 7 - 45 U/L    AST 18 9 - 39 U/L    Total Protein 5.8 (L) 6.4 - 8.2 g/dL   Lactate   Result Value Ref Range    Lactate 2.2 (H) 0.4 - 2.0 mmol/L   Type And Screen   Result Value Ref Range    ABO TYPE A     Rh TYPE POS     ANTIBODY SCREEN NEG    Blood Gas Venous Full Panel   Result Value Ref Range    POCT pH, Venous 7.31 (L) 7.33 - 7.43 pH    POCT pCO2, Venous 53 (H) 41 - 51 mm Hg    POCT pO2, Venous 25 (L) 35 - 45 mm Hg    POCT SO2, Venous 36 (L) 45 - 75 %    POCT Oxy Hemoglobin, Venous 36.1 (L) 45.0 - 75.0 %    POCT Hematocrit Calculated, Venous 33.0 (L) 36.0 - 46.0 %    POCT Sodium, Venous 142 136 - 145 mmol/L    POCT Potassium, Venous 3.4 (L) 3.5 - 5.3 mmol/L    POCT Chloride, Venous 105 98 - 107 mmol/L    POCT Ionized Calicum, Venous 1.21 1.10 - 1.33 mmol/L    POCT Glucose, Venous 163 (H) 74 - 99 mg/dL    POCT Lactate, Venous 2.4 (H) 0.4 - 2.0 mmol/L    POCT Base Excess, Venous -0.2 -2.0 - 3.0 mmol/L    POCT HCO3 Calculated, Venous 26.7 (H) 22.0 - 26.0 mmol/L    POCT Hemoglobin, Venous 11.1 (L) 12.0 - 16.0 g/dL    POCT Anion Gap, Venous 14.0 10.0 - 25.0 mmol/L    Patient Temperature 37.0 degrees Celsius    FiO2 21 %   Troponin I, High Sensitivity, Initial   Result Value Ref Range    Troponin I, High Sensitivity 10 0 - 13 ng/L   B-type natriuretic peptide   Result Value Ref Range     (H) 0 - 99 pg/mL   Troponin, High Sensitivity, 1 Hour   Result Value Ref Range    Troponin I, High Sensitivity 11 0 - 13 ng/L   TSH with reflex to Free T4 if abnormal   Result Value Ref Range    Thyroid Stimulating Hormone 2.35 0.44 - 3.98 mIU/L   Lipase   Result Value Ref Range    Lipase 10 9 - 82 U/L   Sars-CoV-2 and Influenza A/B PCR   Result Value Ref Range    Flu A Result Not Detected Not Detected    Flu B Result Not Detected Not Detected    Coronavirus 2019, PCR Not Detected Not  Detected   RSV PCR   Result Value Ref Range    RSV PCR Not Detected Not Detected   Blood Gas Lactic Acid, Venous   Result Value Ref Range    POCT Lactate, Venous 2.4 (H) 0.4 - 2.0 mmol/L   Lactate   Result Value Ref Range    Lactate 2.4 (H) 0.4 - 2.0 mmol/L   APTT   Result Value Ref Range    aPTT 30 27 - 38 seconds   Heparin Assay, UFH   Result Value Ref Range    Heparin Unfractionated 1.8 (HH) See Comment Below for Therapeutic Ranges IU/mL   Urinalysis with Reflex Culture and Microscopic   Result Value Ref Range    Color, Urine Light-Yellow Light-Yellow, Yellow, Dark-Yellow    Appearance, Urine Clear Clear    Specific Gravity, Urine >1.050 (N) 1.005 - 1.035    pH, Urine 5.5 5.0, 5.5, 6.0, 6.5, 7.0, 7.5, 8.0    Protein, Urine 20 (TRACE) NEGATIVE, 10 (TRACE), 20 (TRACE) mg/dL    Glucose, Urine 30 (TRACE) (A) Normal mg/dL    Blood, Urine 0.03 (TRACE) (A) NEGATIVE    Ketones, Urine NEGATIVE NEGATIVE mg/dL    Bilirubin, Urine NEGATIVE NEGATIVE    Urobilinogen, Urine Normal Normal mg/dL    Nitrite, Urine NEGATIVE NEGATIVE    Leukocyte Esterase, Urine 25 [place]/µL (A) NEGATIVE   Heparin Assay, UFH   Result Value Ref Range    Heparin Unfractionated 0.1 See Comment Below for Therapeutic Ranges IU/mL   Microscopic Only, Urine   Result Value Ref Range    WBC, Urine 1-5 1-5, NONE /HPF    RBC, Urine 1-2 NONE, 1-2, 3-5 /HPF     CT angio chest abdomen pelvis    Result Date: 12/25/2024  Interpreted By:  Maverick Mclain, STUDY: CT ANGIO CHEST ABDOMEN PELVIS;  12/25/2024 1:20 pm   INDICATION: Signs/Symptoms:Hypotension BP 60/30, chills, short of breath, multivessel disease status post CABG, SAH in 2011.   COMPARISON: None.   ACCESSION NUMBER(S): [id number]   ORDERING CLINICIAN: KEIRA BERRY   TECHNIQUE: CT of the chest, abdomen, and pelvis was performed.  Contiguous axial images were obtained at 3 mm slice thickness through the chest, abdomen and pelvis. Coronal and sagittal reconstructions at 3 mm slice thickness were performed. 75  ml of contrast Omnipaque 350 were administered intravenously without immediate complication. 3D reformations were processed of the aorta on a separate workstation.   FINDINGS: CHEST:   LUNG/PLEURA/LARGE AIRWAYS: No endobronchial lesion is seen.   Extensive reticular opacification of the right greater left lower lobes and dependent right upper lobe and right middle lobe. No pneumothorax. No effusion.     VESSELS: The thoracic aorta is unremarkable with respect course, caliber, and contour.   Main pulmonary artery is prominent at 3.1 cm which may indicate pulmonary hypertension.   Large filling defect within the distal aspect of the right main pulmonary artery extending into the right lower lobe lobar and segmental branches medially as well as into the central aspect of the right upper lobe lobar branch.   Postsurgical changes from presumed CABG, not adequately assessed.   HEART: Cardiomegaly. Borderline right heart strain pattern. No pericardial effusion.   MEDIASTINUM AND WINNIE: Subcentimeter calcified right perihilar and mediastinal lymph nodes suggestive of old granulomatous disease. Subcentimeter mediastinal lymph nodes are noted, nonspecific and possibly reactive.   CHEST WALL AND LOWER NECK: No acute osseous abnormality. Multilevel presumed degenerative changes throughout the imaged spine. No acute soft tissue abnormality.   ABDOMEN:   LIVER: Scattered subcentimeter calcifications compatible with old granulomatous disease.   BILE DUCTS: No significant biliary dilitation.   GALLBLADDER: No calcified gallstones.   PANCREAS: There is abnormal dilatation of the main pancreatic duct measuring up to 10 mm in the region of the head.   SPLEEN: Punctate calcifications compatible with old granulomatous disease.   ADRENAL GLANDS: Unremarkable.   KIDNEYS AND URETERS: The kidneys enhance symmetrically.  No hydroureteronephrosis or nephroureterolithiasis is identified.  Subcentimeter renal hypodensities, too small to  characterize via CT, however statistically likely representing cysts.   PELVIS:   BLADDER: Underdistended.   REPRODUCTIVE ORGANS: No pelvic masses.   BOWEL: Colonic diverticulosis. No evidence of acute diverticulitis. No pathologic distension of visualized large or small bowel.     VESSELS: Extensive atherosclerotic calcifications noted about the aortoiliac axis. There is no evidence of aneurysm of the abdominal aorta. 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 There is aneurysm dilatation of the left internal iliac artery up to 1.2 cm.   PERITONEUM/RETROPERITONEUM/LYMPH NODES: No ascites or free air, no fluid collection.  No enlarged mesenteric lymph nodes.   BONE AND SOFT TISSUE: No acute osseous abnormality. Multilevel presumed degenerative changes are noted throughout the imaged spine.  Multilevel presumed chronic or degenerative endplate changes.       CHEST: 1.  Pulmonary embolus involving the right distal main pulmonary artery extending into the right lower lobe and right upper lobar branches as above. Associated borderline right heart strain pattern. 2. Cardiomegaly. 3. Extensive asymmetric reticular infiltrates involving the bilateral right greater left lower lobes, right middle lobe, and right upper lobe. Findings may be seen with sequela of aspiration or a nonspecific pneumonitis.   ABDOMEN-PELVIS: 1.  No acute subdiaphragmatic abnormality. 2. Abnormal dilatation of the main pancreatic duct up to 10 mm. Follow-up nonemergent pancreatic mass protocol MRI or endoscopic ultrasound is advised for further assessment. 3. Colonic diverticulosis without evidence of acute diverticulitis. 4. Extensive atherosclerotic changes noted about the aortoiliac axis with aneurysmal dilatation of the right greater left internal iliac arteries as above.     MACRO: Maverick Mclain discussed the significance and urgency of this critical finding  via epic secure chat. With  KEIRA BERRY on 12/25/2024 at 2:40 pm.  (**-RCF-**) Findings:  See findings.     Signed by: Maverick Mclain 12/25/2024 2:40 PM Dictation workstation:   [id number]    XR chest 1 view    Result Date: 12/25/2024  Interpreted By:  Maverick Mclain, STUDY: XR CHEST 1 VIEW;  12/25/2024 12:45 pm   INDICATION: Signs/Symptoms:Hypotension.   COMPARISON: Chest radiograph 11/05/2024   ACCESSION NUMBER(S): [id number]   ORDERING CLINICIAN: KEIRA BERRY   FINDINGS:     CARDIOMEDIASTINAL SILHOUETTE: Cardiomediastinal silhouette is stable in size and configuration. Status post median sternotomy.   LUNGS: No consolidation, pneumothorax, or significant effusion.   ABDOMEN: No remarkable upper abdominal findings.   BONES: No acute osseous changes.       1.  No evidence of acute cardiopulmonary process.     Signed by: Maverick Mclain 12/25/2024 2:21 PM Dictation workstation:   [id number]    CT cervical spine wo IV contrast    Result Date: 12/25/2024  Interpreted By:  Maverick Mclain, STUDY: CT CERVICAL SPINE WO IV CONTRAST;  12/25/2024 1:20 pm   INDICATION: Signs/Symptoms:Hypotension BP 60/30, chills, short of breath, multivessel disease status post CABG, SAH in 2011.     COMPARISON: None.   ACCESSION NUMBER(S): [id number]   ORDERING CLINICIAN: KEIRA BERRY   TECHNIQUE: Axial CT images of the cervical spine are obtained. Axial, coronal and sagittal reconstructions are provided for review.   FINDINGS:     Fractures: There is no evidence for an acute fracture of the cervical spine.   Vertebral Alignment: No posttraumatic malalignment. There is overall straightening of the normal cervical lordosis, presumed secondary to patient positioning or spasm.   Craniocervical Junction: The odontoid process and craniocervical junction are intact.   Vertebrae/Disc Spaces:  Multilevel spondylosis, most severe at the C5-C7 level. Multilevel disc space narrowing. Multilevel facet arthropathy Multilevel uncovertebral  hypertrophy.Multilevel osteophyte formation.   Prevertebral/Paraspinal Soft Tissues: The prevertebral and paraspinal soft tissues are unremarkable.         Multilevel spondylosis without acute osseous abnormality of the cervical spine.   MACRO: None   Signed by: Maverick Mclain 12/25/2024 2:21 PM Dictation workstation:   [id number]    CT head wo IV contrast    Result Date: 12/25/2024  Interpreted By:  Maverick Mclain, STUDY: CT HEAD WO IV CONTRAST;  12/25/2024 1:20 pm   INDICATION: Signs/Symptoms:Hypotension BP 60/30, chills, short of breath, multivessel disease status post CABG, SAH in 2011.   COMPARISON: Head CT 12/10/2011   ACCESSION NUMBER(S): [id number]   ORDERING CLINICIAN: SARAN BARRAZA   TECHNIQUE: Noncontrast axial CT scan of head was performed. Angled reformats in brain and bone windows were generated. The images were reviewed in bone, brain, blood and soft tissue windows.   FINDINGS: CSF Spaces: The ventricles, sulci and basal cisterns are within normal limits. There is no extraaxial fluid collection.   Parenchyma: Periventricular and subcortical white matter hypoattenuation is nonspecific, however likely reflects chronic microvascular ischemic versus chronic hypertensive changes. The grey-white differentiation is intact. There is no mass effect or midline shift.  There is no intracranial hemorrhage. Tiny focus of presumed old lacunar type ischemia of the left cerebellum which appears similar prior comparison exam.   Calvarium: The calvarium is unremarkable.   Paranasal sinuses and mastoids: Visualized paranasal sinuses and mastoids are clear.       No CT evidence of acute intracranial injury.       MACRO: None     Signed by: Maverick Mclain 12/25/2024 2:16 PM Dictation workstation:   [id number]    Point of Care Ultrasound    Result Date: 12/25/2024  Saran Barraza MD     12/25/2024 12:56 PM Performed by: Saran Barraza MD Authorized by: Saran Barraza MD  Cardiac Indications: hypotension  Procedure: Cardiac Ultrasound Findings:  Views: parasternal long, parasternal short and subxiphoid The pericardial space was visualized and was NEGATIVE for a significant pericardial effusion. LV: LV systolic function was NORMAL. Impression: Cardiac: The focused cardiac ultrasound exam was NORMAL. Comments: Poor quality windows.  IVC appears small caliber with increased respiratory variation.      Assessment/Plan   Assessment & Plan  Acute pulmonary embolism, unspecified pulmonary embolism type, unspecified whether acute cor pulmonale present (Multi)      This is an 80yo female with history of anxiety, CAD s/p CABG 8/2024, and HTN.  [place] [facility] [place] of chills, fever, and hypotension.  Per patient she was on an airplane recently from [place].  ER work up revealed a large pulmonary embolus involving the right distal main pulmonary artery extending into the RLL an RUL branches and associated with right heart strain.  Additional work up showed Cl109, phos 2.3, H&H11.8, 35.0, plt 146, chest xray negative for any acute process.  UA with 25Leu/nL, no bacteria, and 1-5WBCs.  Patient started on heparin infusion and then transferred to ICU for ongoing management of acute pulmonary embolism    Plan:    Neuro   Hx Anxiety  - Neuro checks per unit protocol  - Home seroquel  - Norco for acute pain    CV:   HTN, CAD s/p CABG 8/2024  - NIBP and tele-monitoring  - Plavix  - Crestor  - Holding BB for now  - Echo ordered  - Cardiology consulted    Pulm:   - oxygenating well on RA  - Sats >90%  - Encourage cough and deep breathing    GI:   Pancreatic duct dilatation of 10mm --> Alk phos normal, patient asymptomatic, no complaints of abdominal pain.    - Regular diet  - Bowel regimen  - PPI  - Continue to monitor for symptoms if symptoms present will pursue further imaging.      Renal:   - Daily RFP and replete electrolytes as needed  - Monitor UO    Heme:   Acute pulmonary embolism   - Continue heparin infusion  - Will  need oral AC at some point prior to DC  - Daily CBC    ID:   No leukocytosis, afebrile, no s/s of active infection  - DC abx  - Trend temps  - Trend WBCs    Dispo: Keep in ICU today    Discussed with Dr. Mckeon during MDRs       I spent 60 minutes of critical care time in the professional and overall care of this patient.      Harshal Rodgers, APRN-CNP

## 2024-12-26 NOTE — CONSULTS
Inpatient consult to Cardiology  Consult performed by: MOIRA Robertson  Consult ordered by: MOIRA Tao  Reason for consult: PE with right heart strain and oral AC recs.      History Of Present Illness:    Nevaeh Fair is a 79 y.o. female with past medical history significant for Coronary artery disease s/p CABG x3 (LIMA to LAD, right saphenous vein as a Y graft to ramus and OM) on 8/9/2024 by Dr. Bigg Tellez and prior PCI x3 2015, Hypertension, Hyperlipidemia,  R ICA stenosis, GERD, Diabetes type II, Subarachnoid hemorrhage 2011, Anxiety, Chronic back pain secondary to herniated disc. Patient presented with fatigue. Cardiology is consulted for PE with right heart strain and oral AC recs.    Patient reports she has not been feeling well since Sunday.  States felt lightheaded and disoriented. Bulan like she was having difficulty finding her words. Also states she had chills and felt like she had a cold. Admits she was short of breath but this has been ongoing since her she had her open heart surgery in August.  States shortness of breath is unchanged.  She recently traveled to [place] last Thursday and returned home on Monday.  Denies any chest pain, lower extremity edema, syncope or near syncope.     At [facility], EKG showed sinus rhythm with PACs nonspecific ST T wave abnormalities. CT chest  abdomen and pelvis showed  pulmonary embolus involving the right distal main pulmonary artery extending into the right lower lobe and right upper lobar branches with evidence of right heart strain and cardiomegaly. Also findings concerning for aspiration or nonspecific pneumonitis. Incidental finding abnormal dilatation of main pancreatic duct up to 10 mm and extensive atherosclerotic changes of the aortoiliac axis without aneurysmal dilatation. CT head showed no evidence of intracranial injury.   Point-of-care ultrasound demonstrated preserved LV systolic function and no pericardial effusion and small  "caliber IVC with increased respiratory variation.  High sensitivity troponin negative x 2  Lactate 2.2/2.4/2.1  K 3.4 BUN 12 creatinine 1.21 magnesium 1.59 WBC 6.6 hemoglobin 11.0 hematocrit 33.5 platelets 158    She was initially hypotensive on arrival in the ED with with SBP 60. She was treated with IV fluids, heparin infusion, IV antibiotics and Levophed infusion.   Subsequently admitted to ICU.  Levophed was discontinued 0539 this am.  She currently is hemodynamically stable. PERT team was called and heparin infusion was recommended. No further recommendations.       Follows with cardiologist Dr. Trejo. Seen by  REX Leon 9/9/2024.     Home CV meds: Plavix 75 mg daily, Imdur 30 mg daily, Metoprolol tartrate 25 mg BID, Rosuvastatin 40 mg daily,        Last Recorded Vitals:  Vitals:    12/26/24 0530 12/26/24 0600 12/26/24 0700 12/26/24 0756   BP: 150/73  133/61    BP Location: Left arm  Left arm    Patient Position: Other (Comment)  Lying    Pulse: 70 80 71 80   Resp: 18 17 20 14   Temp: 36.4 °C (97.5 °F)   36.3 °C (97.4 °F)   TempSrc: Temporal   Temporal   SpO2: 98% 99% 91% 97%   Weight: 68.5 kg (151 lb 0.2 oz)      Height: 1.651 m (5' 5\")          Last Labs:  LABS:  CMP:  Results from last 7 days   Lab Units 12/26/24  0814 12/25/24  1231 12/25/24  1228   SODIUM mmol/L 142  --  141   POTASSIUM mmol/L 3.5  --  3.5   CHLORIDE mmol/L 109*  --  109*   CO2 mmol/L 25  --  23   ANION GAP mmol/L 12  --  13   BUN mg/dL 7  --  12   CREATININE mg/dL 0.82  --  1.21*   EGFR mL/min/1.73m*2 72  --  45*   MAGNESIUM mg/dL  --   --  1.59*   ALBUMIN g/dL 3.4  --  3.0*   ALT U/L  --   --  9   AST U/L  --   --  18   BILIRUBIN TOTAL mg/dL  --   --  0.7   LIPASE U/L  --  10  --      CBC:  Results from last 7 days   Lab Units 12/26/24  0814 12/25/24  1228   WBC AUTO x10*3/uL 4.5 6.6   HEMOGLOBIN g/dL 11.8* 11.0*   HEMATOCRIT % 35.0* 33.5*   PLATELETS AUTO x10*3/uL 146* 158   MCV fL 92 94     COAG:     ABO:   ABO " TYPE   Date Value Ref Range Status   12/25/2024 A  Final     HEME/ENDO:  Results from last 7 days   Lab Units 12/25/24  1231   TSH mIU/L 2.35      CARDIAC:   Results from last 7 days   Lab Units 12/25/24  1231 12/25/24  1228   TROPHS ng/L 11 10   BNP pg/mL  --  174*     Recent Labs     09/30/22  0908 04/30/21  1117 10/06/20  1205   CHOL 153 183 147   LDLF 67 85 53   HDL 49.4 54.3 45.9   TRIG 185* 219* 239*        Imagine Results  CT head wo IV contrast   Final Result   No CT evidence of acute intracranial injury.                  MACRO:   None             Signed by: Maverick Mclain 12/25/2024 2:16 PM   Dictation workstation:   [id number]      CT cervical spine wo IV contrast   Final Result   Multilevel spondylosis without acute osseous abnormality of the   cervical spine.        MACRO:   None        Signed by: Maverick Mclain 12/25/2024 2:21 PM   Dictation workstation:   [id number]      CT angio chest abdomen pelvis   Final Result   CHEST:   1.  Pulmonary embolus involving the right distal main pulmonary   artery extending into the right lower lobe and right upper lobar   branches as above. Associated borderline right heart strain pattern.   2. Cardiomegaly.   3. Extensive asymmetric reticular infiltrates involving the bilateral   right greater left lower lobes, right middle lobe, and right upper   lobe. Findings may be seen with sequela of aspiration or a   nonspecific pneumonitis.        ABDOMEN-PELVIS:   1.  No acute subdiaphragmatic abnormality.   2. Abnormal dilatation of the main pancreatic duct up to 10 mm.   Follow-up nonemergent pancreatic mass protocol MRI or endoscopic   ultrasound is advised for further assessment.   3. Colonic diverticulosis without evidence of acute diverticulitis.   4. Extensive atherosclerotic changes noted about the aortoiliac axis   with aneurysmal dilatation of the right greater left internal iliac   arteries as above.             MACRO:   Maverick Mclain discussed the significance  and urgency of this critical   finding via epic secure chat. With  KEIRA BERRY on 12/25/2024 at   2:40 pm.  (**-RCF-**) Findings:  See findings.             Signed by: Maverick Mclain 12/25/2024 2:40 PM   Dictation workstation:   [id number]      Point of Care Ultrasound   Final Result      XR chest 1 view   Final Result   1.  No evidence of acute cardiopulmonary process.             Signed by: Maverick Mclain 12/25/2024 2:21 PM   Dictation workstation:   [id number]      Transthoracic Echo (TTE) Limited    (Results Pending)         Last I/O:  No intake/output data recorded.    Past Cardiology Tests (Last 3 Years):  EKG:  ECG 12 lead 12/25/2024       Echo:  Echocardiogram 10/7/2024        Anesthesia Intraoperative Transesophageal Echocardiogram 08/09/2024   1. The left ventricular systolic function is normal, with a visually estimated ejection fraction of 55-60%.   2. There is normal right ventricular global systolic function.   3. Lipomatous hypertrophy of the atrial septum.    Transthoracic Echo (TTE) Complete 08/06/2024   1. Left ventricular ejection fraction is normal, calculated by Andrews's biplane at 57%.   2. Left ventricular cavity size is decreased.   3. There is normal right ventricular global systolic function.   4. RVSP within normal limits.      Ejection Fractions:  EF   Date/Time Value Ref Range Status   08/09/2024 08:00 AM 58 %    08/06/2024 04:55 PM 57 %      Cath:  Cardiac Catheterization Procedure 07/10/2024    Coronary Lesion Summary:  Vessel               Stenosis                     Vessel Segment  LAD                   80% stenosis            distal third of the proximal  1st Diagonal      80% stenosis                       ostial  Circumflex         85% stenosis and 60-70% ostial and distal third of the proximal  OM 1                 80% stenosis                      proximal     CONCLUSIONS:   1. Double vessel coronary artery disease with proximal left anterior descending involvement.   2.  The 1st diagonal branch showed severe ostial atherosclerotic disease.   3. The 1st obtuse marginal branch showed severe proximal atherosclerotic disease.   4. Culprit vessel(s): left anterior descending, 1st diagonal, circumflex and 1st obtuse marginal.   5. Large left subclavian artery gives origin to large LIMA, a suitable graft conduit.    Stress Test:  Nuclear Stress Test 11/15/2024  wit    Cardiac Imaging:  No results found for this or any previous visit from the past 1095 days.      Past Medical History:  She has a past medical history of Abdominal cramping, Anxiety, Coronary artery disease, Familial hypercholesterolemia (04/17/2014), Hypertension, and Presence of intraocular lens (07/10/2014).    Past Surgical History:  She has a past surgical history that includes Cataract extraction; Cardiac catheterization (N/A, 07/10/2024); Colonoscopy; and Esophagogastroduodenoscopy.      Social History:  She reports that she has quit smoking. Her smoking use included cigarettes. She has never used smokeless tobacco. She reports that she does not drink alcohol and does not use drugs.    Family History:  Family History   Problem Relation Name Age of Onset    Heart disease Mother      Coronary artery disease Mother      Heart disease Father      Heart attack Father      Hypertension Sister          Allergies:  Patient has no known allergies.    Inpatient Medications:  Scheduled medications   Medication Dose Route Frequency    clopidogrel  75 mg oral Daily    magnesium sulfate  2 g intravenous Once    pantoprazole  40 mg oral Daily before breakfast    QUEtiapine  75 mg oral Nightly    rosuvastatin  40 mg oral Nightly     PRN medications   Medication    heparin    HYDROcodone-acetaminophen     Continuous Medications   Medication Dose Last Rate    heparin  0-4,500 Units/hr 1,500 Units/hr (12/26/24 0800)    norepinephrine  0-0.2 mcg/kg/min Stopped (12/26/24 0540)     Outpatient Medications:  Current Outpatient Medications    Medication Instructions    clopidogrel (PLAVIX) 75 mg, oral, Daily    HYDROcodone-acetaminophen (Norco) 5-325 mg tablet 1 tablet, Every 6 hours PRN    hydrocortisone 2.5 % cream Topical, 2 times daily PRN    isosorbide mononitrate ER (Imdur) 30 mg 24 hr tablet TAKE 1 TABLET BY MOUTH DAILY - TAKE WITH WATER (SWALLOW WHOLE)    metoprolol tartrate (LOPRESSOR) 25 mg, oral, 2 times daily    omeprazole (PRILOSEC) 40 mg, oral, 2 times daily    Ozempic 0.5 mg, subcutaneous, Every 7 days    QUEtiapine (SEROQUEL) 75 mg, oral, Nightly    rosuvastatin (CRESTOR) 40 mg, oral, Nightly    venlafaxine XR (EFFEXOR-XR) 37.5 mg, oral, Daily, Do not crush or chew.       Physical Exam:  GENERAL: alert, cooperative, pleasant, in no acute distress  SKIN: warm, dry  NECK: no JVD  CARDIAC: Regular rate and rhythm no murmurs  PULMONARY: Normal respiratory efforts, lungs clear to auscultation bilaterally.  ABDOMEN: soft, nondistended  EXTREMITIES: no lower extremity edema  NEURO: Alert and oriented x 3.  Grossly normal.  Moves all 4 extremities.     I reviewed EKGs, labs and all imaging reports  I reviewed telemetry which showed  SR HR 60-70s     Assessment/Plan   Nevaeh Fair is a 79 y.o. female with past medical history significant for Coronary artery disease s/p CABG x3 (LIMA to LAD, right saphenous vein as a Y graft to ramus and OM) on 8/9/2024 by Dr. Bigg Tellez and prior PCI x3 2015, Hypertension, Hyperlipidemia,  R ICA stenosis, GERD, Diabetes type II, Subarachnoid hemorrhage 2011, Anxiety, Chronic back pain secondary to herniated disc. Patient presented with fatigue. Cardiology is consulted for PE with right heart strain and oral AC recs.    Home CV meds: Plavix 75 mg daily, Imdur 30 mg daily, Metoprolol tartrate 25 mg BID, Rosuvastatin 40 mg daily,       #Acute PE   -CT scan showed pulmonary embolus involving the right distal main pulmonary artery extending into the right lower lobe and right upper lobar branches with evidence of  right heart strain and cardiomegaly.   -High sensitivity troponin negative x 2  Lactate 2.2/2.4/2.1   -Echo is pending       Recommendations   Continue heparin infusion for now   Walk test  Echo     Further recommendations once echo results are available       Code Status:  Full Code      Molly Reinoso, APRN-CNP

## 2024-12-26 NOTE — CARE PLAN
Problem: Pain - Adult  Goal: Verbalizes/displays adequate comfort level or baseline comfort level  Outcome: Progressing     Problem: Safety - Adult  Goal: Free from fall injury  Outcome: Progressing     Problem: Discharge Planning  Goal: Discharge to home or other facility with appropriate resources  Outcome: Progressing     Problem: Chronic Conditions and Co-morbidities  Goal: Patient's chronic conditions and co-morbidity symptoms are monitored and maintained or improved  Outcome: Progressing   The patient's goals for the shift include pain management.     The clinical goals for the shift include weaning vasopressor and managing pain.     Over the shift, the patient did make progress toward the following goals. Barriers to progression include lack of sleep. Recommendations to address these barriers include promoting rest and clustering care.

## 2024-12-26 NOTE — PROGRESS NOTES
12/26/24 1037   ACS Disability Status   Are you deaf or do you have serious difficulty hearing? N   Are you blind or do you have serious difficulty seeing, even when wearing glasses? N   Because of a physical, mental, or emotional condition, do you have serious difficulty concentrating, remembering, or making decisions? (5 years old or older) N   Do you have serious difficulty walking or climbing stairs? N   Do you have serious difficulty dressing or bathing? N   Because of a physical, mental, or emotional condition, do you have serious difficulty doing errands alone such as visiting the doctor? N     Nevaeh Fair is a 79 y.o. female on day 1 of admission presenting with Acute pulmonary embolism, unspecified pulmonary embolism type, unspecified whether acute cor pulmonale present (Multi).    Kelly Montoya RN

## 2024-12-26 NOTE — RESEARCH NOTES
Artificial Intelligence Monitoring in Nursing (AIMS Nursing) Study    Principle Investigator - Dr. Aaron Hussein  Research Coordinator - Mateo Ponce RN     Patient Name - Nevaeh Fair  Date - 12/26/2024 9:00 AM  Location - 4th Floor ICU Amanda Fair was approached by Mateo Ponce RN to talk about participating in the AIMS Nursing Study. The consent form was signed by the patient and they were given a copy for their records. Study protocol was followed and patient was given study contact information.     Mateo Ponce RN

## 2024-12-26 NOTE — PROGRESS NOTES
12/26/24 1036   Discharge Planning   Living Arrangements Spouse/significant other   Support Systems Spouse/significant other   Assistance Needed Beba Montgomery, DO  Family Medicine  SOB (shortness of breath) +2 more  DxI met with the patient in the room. She lives with her spouse. She was indepednent prior to being admitted.   Type of Residence Private residence   Number of Stairs to Enter Residence 4   Do you have animals or pets at home? No   Who is requesting discharge planning? Patient   Home or Post Acute Services None   Expected Discharge Disposition Home   Financial Resource Strain   How hard is it for you to pay for the very basics like food, housing, medical care, and heating? Not hard   Housing Stability   In the last 12 months, was there a time when you were not able to pay the mortgage or rent on time? N   In the past 12 months, how many times have you moved where you were living? 0   At any time in the past 12 months, were you homeless or living in a shelter (including now)? N   Transportation Needs   In the past 12 months, has lack of transportation kept you from medical appointments or from getting medications? no   In the past 12 months, has lack of transportation kept you from meetings, work, or from getting things needed for daily living? No     Nevaeh Fair is a 79 y.o. female on day 1 of admission presenting with Acute pulmonary embolism, unspecified pulmonary embolism type, unspecified whether acute cor pulmonale present (Multi).    Kelly Montoya RN

## 2024-12-27 LAB
ALBUMIN SERPL BCP-MCNC: 2.9 G/DL (ref 3.4–5)
ANION GAP SERPL CALC-SCNC: 10 MMOL/L (ref 10–20)
BACTERIA UR CULT: NO GROWTH
BUN SERPL-MCNC: 6 MG/DL (ref 6–23)
CALCIUM SERPL-MCNC: 8.4 MG/DL (ref 8.6–10.3)
CHLORIDE SERPL-SCNC: 112 MMOL/L (ref 98–107)
CO2 SERPL-SCNC: 27 MMOL/L (ref 21–32)
CREAT SERPL-MCNC: 0.66 MG/DL (ref 0.5–1.05)
EGFRCR SERPLBLD CKD-EPI 2021: 89 ML/MIN/1.73M*2
GLUCOSE BLD MANUAL STRIP-MCNC: 124 MG/DL (ref 74–99)
GLUCOSE BLD MANUAL STRIP-MCNC: 89 MG/DL (ref 74–99)
GLUCOSE SERPL-MCNC: 96 MG/DL (ref 74–99)
MAGNESIUM SERPL-MCNC: 1.94 MG/DL (ref 1.6–2.4)
PHOSPHATE SERPL-MCNC: 2.5 MG/DL (ref 2.5–4.9)
POTASSIUM SERPL-SCNC: 3.4 MMOL/L (ref 3.5–5.3)
SODIUM SERPL-SCNC: 146 MMOL/L (ref 136–145)
STAPHYLOCOCCUS SPEC CULT: NORMAL
UFH PPP CHRO-ACNC: 1.6 IU/ML

## 2024-12-27 PROCEDURE — 2500000001 HC RX 250 WO HCPCS SELF ADMINISTERED DRUGS (ALT 637 FOR MEDICARE OP): Performed by: NURSE PRACTITIONER

## 2024-12-27 PROCEDURE — 83735 ASSAY OF MAGNESIUM: CPT | Performed by: NURSE PRACTITIONER

## 2024-12-27 PROCEDURE — 2500000002 HC RX 250 W HCPCS SELF ADMINISTERED DRUGS (ALT 637 FOR MEDICARE OP, ALT 636 FOR OP/ED): Performed by: NURSE PRACTITIONER

## 2024-12-27 PROCEDURE — 2500000002 HC RX 250 W HCPCS SELF ADMINISTERED DRUGS (ALT 637 FOR MEDICARE OP, ALT 636 FOR OP/ED): Performed by: HOSPITALIST

## 2024-12-27 PROCEDURE — 2060000001 HC INTERMEDIATE ICU ROOM DAILY

## 2024-12-27 PROCEDURE — 99291 CRITICAL CARE FIRST HOUR: CPT | Performed by: NURSE PRACTITIONER

## 2024-12-27 PROCEDURE — 85520 HEPARIN ASSAY: CPT | Performed by: HOSPITALIST

## 2024-12-27 PROCEDURE — 2500000004 HC RX 250 GENERAL PHARMACY W/ HCPCS (ALT 636 FOR OP/ED): Performed by: SURGERY

## 2024-12-27 PROCEDURE — 36415 COLL VENOUS BLD VENIPUNCTURE: CPT | Performed by: HOSPITALIST

## 2024-12-27 PROCEDURE — 2500000001 HC RX 250 WO HCPCS SELF ADMINISTERED DRUGS (ALT 637 FOR MEDICARE OP): Performed by: HOSPITALIST

## 2024-12-27 PROCEDURE — 80069 RENAL FUNCTION PANEL: CPT | Performed by: NURSE PRACTITIONER

## 2024-12-27 PROCEDURE — 2500000004 HC RX 250 GENERAL PHARMACY W/ HCPCS (ALT 636 FOR OP/ED): Performed by: HOSPITALIST

## 2024-12-27 PROCEDURE — 82947 ASSAY GLUCOSE BLOOD QUANT: CPT

## 2024-12-27 PROCEDURE — 36415 COLL VENOUS BLD VENIPUNCTURE: CPT | Performed by: NURSE PRACTITIONER

## 2024-12-27 RX ORDER — HYDRALAZINE HYDROCHLORIDE 20 MG/ML
10 INJECTION INTRAMUSCULAR; INTRAVENOUS EVERY 6 HOURS PRN
Status: DISCONTINUED | OUTPATIENT
Start: 2024-12-27 | End: 2024-12-28 | Stop reason: HOSPADM

## 2024-12-27 RX ORDER — POTASSIUM CHLORIDE 20 MEQ/1
20 TABLET, EXTENDED RELEASE ORAL
Status: COMPLETED | OUTPATIENT
Start: 2024-12-27 | End: 2024-12-27

## 2024-12-27 RX ADMIN — QUETIAPINE FUMARATE 75 MG: 50 TABLET ORAL at 20:28

## 2024-12-27 RX ADMIN — APIXABAN 5 MG: 5 TABLET, FILM COATED ORAL at 13:16

## 2024-12-27 RX ADMIN — POTASSIUM CHLORIDE 20 MEQ: 1500 TABLET, EXTENDED RELEASE ORAL at 13:04

## 2024-12-27 RX ADMIN — POTASSIUM CHLORIDE 20 MEQ: 1500 TABLET, EXTENDED RELEASE ORAL at 10:55

## 2024-12-27 RX ADMIN — LOPERAMIDE HYDROCHLORIDE 2 MG: 2 CAPSULE ORAL at 08:30

## 2024-12-27 RX ADMIN — HYDRALAZINE HYDROCHLORIDE 10 MG: 20 INJECTION INTRAMUSCULAR; INTRAVENOUS at 20:44

## 2024-12-27 RX ADMIN — ROSUVASTATIN 40 MG: 20 TABLET, FILM COATED ORAL at 20:28

## 2024-12-27 RX ADMIN — CLOPIDOGREL 75 MG: 75 TABLET ORAL at 08:29

## 2024-12-27 RX ADMIN — PANTOPRAZOLE SODIUM 40 MG: 40 TABLET, DELAYED RELEASE ORAL at 06:19

## 2024-12-27 RX ADMIN — APIXABAN 5 MG: 5 TABLET, FILM COATED ORAL at 20:28

## 2024-12-27 RX ADMIN — POTASSIUM CHLORIDE 20 MEQ: 1500 TABLET, EXTENDED RELEASE ORAL at 11:45

## 2024-12-27 RX ADMIN — HEPARIN SODIUM 1500 UNITS/HR: 10000 INJECTION, SOLUTION INTRAVENOUS at 06:19

## 2024-12-27 ASSESSMENT — PAIN - FUNCTIONAL ASSESSMENT
PAIN_FUNCTIONAL_ASSESSMENT: 0-10
PAIN_FUNCTIONAL_ASSESSMENT: 0-10

## 2024-12-27 ASSESSMENT — PAIN SCALES - GENERAL
PAINLEVEL_OUTOF10: 0 - NO PAIN

## 2024-12-27 NOTE — CARE PLAN
Problem: Pain - Adult  Goal: Verbalizes/displays adequate comfort level or baseline comfort level  Outcome: Progressing     Problem: Safety - Adult  Goal: Free from fall injury  Outcome: Progressing     Problem: Discharge Planning  Goal: Discharge to home or other facility with appropriate resources  Outcome: Progressing     Problem: Chronic Conditions and Co-morbidities  Goal: Patient's chronic conditions and co-morbidity symptoms are monitored and maintained or improved  Outcome: Progressing     Problem: Diabetes  Goal: Achieve decreasing blood glucose levels by end of shift  Outcome: Progressing  Goal: Increase stability of blood glucose readings by end of shift  Outcome: Progressing  Goal: Decrease in ketones present in urine by end of shift  Outcome: Progressing  Goal: Maintain electrolyte levels within acceptable range throughout shift  Outcome: Progressing  Goal: Maintain glucose levels >70mg/dl to <250mg/dl throughout shift  Outcome: Progressing  Goal: No changes in neurological exam by end of shift  Outcome: Progressing  Goal: Learn about and adhere to nutrition recommendations by end of shift  Outcome: Progressing  Goal: Vital signs within normal range for age by end of shift  Outcome: Progressing  Goal: Increase self care and/or family involovement by end of shift  Outcome: Progressing  Goal: Receive DSME education by end of shift  Outcome: Progressing     Problem: Pain  Goal: Takes deep breaths with improved pain control throughout the shift  Outcome: Progressing  Goal: Turns in bed with improved pain control throughout the shift  Outcome: Progressing  Goal: Walks with improved pain control throughout the shift  Outcome: Progressing  Goal: Performs ADL's with improved pain control throughout shift  Outcome: Progressing  Goal: Participates in PT with improved pain control throughout the shift  Outcome: Progressing  Goal: Free from opioid side effects throughout the shift  Outcome: Progressing  Goal:  Free from acute confusion related to pain meds throughout the shift  Outcome: Progressing  The clinical goals for the shift include patient will have no signs of bleeding throughout this shift

## 2024-12-27 NOTE — PROGRESS NOTES
Subjective Data:  Doing well  No shortness of breath    On room air  Hoping to be discharged home.    Tele :  SR    Overnight Events:    None      Objective Data:  Last Recorded Vitals:  Vitals:    24 0400 24 0500 24 0600 24 0605   BP: 151/73 135/81 177/79 155/90   BP Location:       Patient Position:       Pulse: 61 60 80 69   Resp: 14 19   Temp:       TempSrc:       SpO2: 99% 99% 99% 98%   Weight:   67.3 kg (148 lb 5.9 oz)    Height:         Medical Gas Therapy: None (Room air)  Medical Gas Delivery Method: Nasal cannula  Weight  Av.3 kg (150 lb 7.7 oz)  Min: 67.1 kg (148 lb)  Max: 70.1 kg (154 lb 8.7 oz)    LABS:  CMP:  Results from last 7 days   Lab Units 24  0744 24  0814 24  1231 24  1228   SODIUM mmol/L 146* 142  --  141   POTASSIUM mmol/L 3.4* 3.5  --  3.5   CHLORIDE mmol/L 112* 109*  --  109*   CO2 mmol/L   --  23   ANION GAP mmol/L 10 12  --  13   BUN mg/dL 6 7  --  12   CREATININE mg/dL 0.66 0.82  --  1.21*   EGFR mL/min/1.73m*2 89 72  --  45*   MAGNESIUM mg/dL 1.94  --   --  1.59*   ALBUMIN g/dL 2.9* 3.4  --  3.0*   ALT U/L  --   --   --  9   AST U/L  --   --   --  18   BILIRUBIN TOTAL mg/dL  --   --   --  0.7   LIPASE U/L  --   --  10  --      CBC:  Results from last 7 days   Lab Units 24  0814 24  1228   WBC AUTO x10*3/uL 4.5 6.6   HEMOGLOBIN g/dL 11.8* 11.0*   HEMATOCRIT % 35.0* 33.5*   PLATELETS AUTO x10*3/uL 146* 158   MCV fL 92 94     COAG:     ABO:   ABO TYPE   Date Value Ref Range Status   2024 A  Final     HEME/ENDO:  Results from last 7 days   Lab Units 24  1231   TSH mIU/L 2.35      CARDIAC:   Results from last 7 days   Lab Units 24  1231 24  1228   TROPHS ng/L 11 10   BNP pg/mL  --  174*             Last I/O:    Intake/Output Summary (Last 24 hours) at 2024 1204  Last data filed at 2024 1100  Gross per 24 hour   Intake 420 ml   Output --   Net 420 ml     Net IO Since Admission: 660  mL [12/27/24 1204]      Imaging Results:      Past Cardiology Tests (Last 3 Years):  EKG:  Results for orders placed during the hospital encounter of 12/25/24    ECG 12 lead (Preliminary)  This result has not been signed. Information might be incomplete.    Narrative  Sinus rhythm with Premature supraventricular complexes  ST & T wave abnormality, consider anterolateral ischemia  Abnormal ECG  When compared with ECG of 16-SEP-2024 09:12,  Premature supraventricular complexes are now Present  QRS voltage has increased  T wave inversion less evident in Lateral leads      Results for orders placed during the hospital encounter of 09/16/24    ECG 12 lead (Ancillary Performed)    Narrative  Normal sinus rhythm  Low voltage QRS  T wave abnormality, consider lateral ischemia  Abnormal ECG  No previous ECGs available  Confirmed by James Salter ([id number]) on 9/16/2024 2:36:27 PM      Results for orders placed during the hospital encounter of 08/06/24    ECG 12 lead    Narrative  Normal sinus rhythm  Nonspecific ST abnormality  Abnormal ECG  When compared with ECG of 27-MAY-2010 11:26,  T wave inversion no longer evident in Anterolateral leads  Confirmed by Jason Irwin ([id number]) on 8/7/2024 4:38:31 PM      Echo:  Echocardiogram 10/7/2024          Anesthesia Intraoperative Transesophageal Echocardiogram 08/09/2024   1. The left ventricular systolic function is normal, with a visually estimated ejection fraction of 55-60%.   2. There is normal right ventricular global systolic function.   3. Lipomatous hypertrophy of the atrial septum.     Transthoracic Echo (TTE) Complete 08/06/2024   1. Left ventricular ejection fraction is normal, calculated by Andrews's biplane at 57%.   2. Left ventricular cavity size is decreased.   3. There is normal right ventricular global systolic function.   4. RVSP within normal limits.        Ejection Fractions:        EF   Date/Time Value Ref Range Status   08/09/2024 08:00 AM 58 %     08/06/2024 04:55  PM 57 %        Cath:  Cardiac Catheterization Procedure 07/10/2024    Coronary Lesion Summary:  Vessel               Stenosis                     Vessel Segment  LAD                   80% stenosis            distal third of the proximal  1st Diagonal      80% stenosis                       ostial  Circumflex         85% stenosis and 60-70% ostial and distal third of the proximal  OM 1                 80% stenosis                      proximal     CONCLUSIONS:   1. Double vessel coronary artery disease with proximal left anterior descending involvement.   2. The 1st diagonal branch showed severe ostial atherosclerotic disease.   3. The 1st obtuse marginal branch showed severe proximal atherosclerotic disease.   4. Culprit vessel(s): left anterior descending, 1st diagonal, circumflex and 1st obtuse marginal.   5. Large left subclavian artery gives origin to large LIMA, a suitable graft conduit.     Stress Test:  Nuclear Stress Test 11/15/2024      Inpatient Medications:  Scheduled medications   Medication Dose Route Frequency    clopidogrel  75 mg oral Daily    pantoprazole  40 mg oral Daily before breakfast    polyethylene glycol  17 g oral Daily    potassium chloride CR  20 mEq oral q1h    QUEtiapine  75 mg oral Nightly    rosuvastatin  40 mg oral Nightly     PRN medications   Medication    heparin    HYDROcodone-acetaminophen    loperamide     Continuous Medications   Medication Dose Last Rate    heparin  0-4,500 Units/hr 1,500 Units/hr (12/27/24 0619)       Physical Exam:    GENERAL: alert, cooperative, pleasant, in no acute distress  SKIN: warm, dry  NECK: no JVD  CARDIAC: Regular rate and rhythm no murmurs  PULMONARY: Normal respiratory efforts, lungs clear to auscultation bilaterally.  ABDOMEN: soft, nondistended  EXTREMITIES: no lower extremity edema  NEURO: Alert and oriented x 3.  Grossly normal.  Moves all 4 extremities.      Assessment/Plan     Nevaeh Fair is a 79 y.o. female with past medical history  significant for Coronary artery disease s/p CABG x3 (LIMA to LAD, right saphenous vein as a Y graft to ramus and OM) on 8/9/2024 by Dr. Bigg Tellez and prior PCI x3 2015, Hypertension, Hyperlipidemia,  R ICA stenosis, GERD, Diabetes type II, Subarachnoid hemorrhage 2011, Anxiety, Chronic back pain secondary to herniated disc. Patient presented with fatigue. Cardiology is consulted for PE with right heart strain and oral AC recs.     Home CV meds: Plavix 75 mg daily, Imdur 30 mg daily, Metoprolol tartrate 25 mg BID, Rosuvastatin 40 mg daily,        #Acute PE   -CT scan showed pulmonary embolus involving the right distal main pulmonary artery extending into the right lower lobe and right upper lobar branches with evidence of right heart strain and cardiomegaly.   -High sensitivity troponin negative x 2  Lactate 2.2/2.4/2.1   -Echo 12/26/2024   1. Left ventricular ejection fraction is normal, calculated by Andrews's biplane at 57%.   2. There is normal right ventricular global systolic function.   3. Slightly elevated right ventricular systolic pressure.        Recommendations   - OK to change to DOAC at this point    Ok to discharge home from a cardiology standpoint.     She will need to follow up with vascular medicine.  ( Referral placed)    Code Status:  Full Code    Darleen Al, GUDELIA-CNP

## 2024-12-27 NOTE — CARE PLAN
The patient's goals for the shift include      The clinical goals for the shift include patient will have no signs of bleeding throughout this shift      Problem: Pain - Adult  Goal: Verbalizes/displays adequate comfort level or baseline comfort level  Outcome: Progressing     Problem: Safety - Adult  Goal: Free from fall injury  Outcome: Progressing     Problem: Discharge Planning  Goal: Discharge to home or other facility with appropriate resources  Outcome: Progressing     Problem: Chronic Conditions and Co-morbidities  Goal: Patient's chronic conditions and co-morbidity symptoms are monitored and maintained or improved  Outcome: Progressing     Problem: Diabetes  Goal: Achieve decreasing blood glucose levels by end of shift  Outcome: Progressing  Goal: Increase stability of blood glucose readings by end of shift  Outcome: Progressing  Goal: Decrease in ketones present in urine by end of shift  Outcome: Progressing  Goal: Maintain electrolyte levels within acceptable range throughout shift  Outcome: Progressing  Goal: Maintain glucose levels >70mg/dl to <250mg/dl throughout shift  Outcome: Progressing  Goal: No changes in neurological exam by end of shift  Outcome: Progressing  Goal: Learn about and adhere to nutrition recommendations by end of shift  Outcome: Progressing  Goal: Vital signs within normal range for age by end of shift  Outcome: Progressing  Goal: Increase self care and/or family involovement by end of shift  Outcome: Progressing  Goal: Receive DSME education by end of shift  Outcome: Progressing

## 2024-12-27 NOTE — PROGRESS NOTES
"Nevaeh Fair is a 79 y.o. female on day 2 of admission presenting with Acute pulmonary embolism, unspecified pulmonary embolism type, unspecified whether acute cor pulmonale present (Multi).    Subjective   TAMEKA reported         Objective     Physical Exam  Constitutional:       General: She is not in acute distress.     Appearance: She is not ill-appearing.   Cardiovascular:      Rate and Rhythm: Normal rate and regular rhythm.      Pulses: Normal pulses.   Pulmonary:      Effort: Pulmonary effort is normal. No respiratory distress.   Abdominal:      General: There is no distension.      Palpations: Abdomen is soft.      Tenderness: There is no abdominal tenderness.   Skin:     General: Skin is warm and dry.      Capillary Refill: Capillary refill takes less than 2 seconds.   Neurological:      General: No focal deficit present.      Mental Status: She is alert and oriented to person, place, and time. Mental status is at baseline.   Psychiatric:         Mood and Affect: Mood normal.         Last Recorded Vitals  Blood pressure 135/81, pulse 60, temperature 37 °C (98.6 °F), temperature source Temporal, resp. rate 12, height 1.651 m (5' 5\"), weight 68.5 kg (151 lb 0.2 oz), SpO2 99%.  Intake/Output last 3 Shifts:  I/O last 3 completed shifts:  In: 240 (3.5 mL/kg) [P.O.:240]  Out: - (0 mL/kg)   Weight: 68.5 kg     Relevant Results      Assessment/Plan   Assessment & Plan  Acute pulmonary embolism, unspecified pulmonary embolism type, unspecified whether acute cor pulmonale present (Multi)    This is an 78yo female with history of anxiety, CAD s/p CABG 8/2024, and HTN.  [place] [facility] [place] of [place]ills, fever, and hypotension.  Per patient she was on an airplane recently from [place].  ER work up revealed a large pulmonary embolus involving the right distal main pulmonary artery extending into the RLL an RUL branches and associated with right heart strain.  Additional work up showed Cl109, phos 2.3, H&H11.8, " 35.0, plt 146, chest xray negative for any acute process.  UA with 25Leu/nL, no bacteria, and 1-5WBCs.  Patient started on heparin infusion and then transferred to ICU for ongoing management of acute pulmonary embolism     Plan:     Neuro   Hx Anxiety  - Neuro checks per unit protocol  - Home seroquel  - Norco for acute pain     CV:   HTN, CAD s/p CABG 8/2024  Echo 12/26 --> LVEF 57%  - NIBP and tele-monitoring  - Plavix  - Crestor  - Holding BB for now  - Will Begin Eliquis   - Cardiology consulted, appreciate recs     Pulm:   - oxygenating well on RA  - Sats >90%  - Encourage cough and deep breathing     GI:   Pancreatic duct dilatation of 10mm --> Alk phos normal, patient asymptomatic, no complaints of abdominal pain.    - Regular diet  - Bowel regimen  - PPI  - Continue to monitor for symptoms if symptoms present will pursue further imaging.       Renal:   - Daily RFP and replete electrolytes as needed  - Monitor UO     Heme:   Acute pulmonary embolism   - Continue heparin infusion  - Will need oral AC at some point prior to DC --> DOAC vs Warfarin  - Daily CBC     ID:   No leukocytosis, afebrile, no s/s of active infection  - DC abx  - Trend temps  - Trend WBCs    Dispo: Will keep in ICU for today with potential DC home tomorrow    Discussed with Dr. Mckeon and team during MDRs     I spent 60 minutes of critical care time in the professional and overall care of this patient.      Harshal Rodgers, APRN-CNP

## 2024-12-28 ENCOUNTER — PHARMACY VISIT (OUTPATIENT)
Dept: PHARMACY | Facility: CLINIC | Age: 79
End: 2024-12-28
Payer: COMMERCIAL

## 2024-12-28 VITALS
TEMPERATURE: 98.1 F | HEIGHT: 65 IN | OXYGEN SATURATION: 99 % | RESPIRATION RATE: 22 BRPM | WEIGHT: 147.71 LBS | DIASTOLIC BLOOD PRESSURE: 90 MMHG | SYSTOLIC BLOOD PRESSURE: 131 MMHG | HEART RATE: 73 BPM | BODY MASS INDEX: 24.61 KG/M2

## 2024-12-28 LAB
ALBUMIN SERPL BCP-MCNC: 3 G/DL (ref 3.4–5)
ALP SERPL-CCNC: 80 U/L (ref 33–136)
ALT SERPL W P-5'-P-CCNC: 8 U/L (ref 7–45)
ANION GAP SERPL CALC-SCNC: 11 MMOL/L (ref 10–20)
AST SERPL W P-5'-P-CCNC: 12 U/L (ref 9–39)
ATRIAL RATE: 66 BPM
BILIRUB DIRECT SERPL-MCNC: 0 MG/DL (ref 0–0.3)
BILIRUB SERPL-MCNC: 0.4 MG/DL (ref 0–1.2)
BUN SERPL-MCNC: 7 MG/DL (ref 6–23)
CALCIUM SERPL-MCNC: 8.5 MG/DL (ref 8.6–10.3)
CHLORIDE SERPL-SCNC: 113 MMOL/L (ref 98–107)
CO2 SERPL-SCNC: 23 MMOL/L (ref 21–32)
CREAT SERPL-MCNC: 0.64 MG/DL (ref 0.5–1.05)
EGFRCR SERPLBLD CKD-EPI 2021: 89 ML/MIN/1.73M*2
ERYTHROCYTE [DISTWIDTH] IN BLOOD BY AUTOMATED COUNT: 15.2 % (ref 11.5–14.5)
GLUCOSE SERPL-MCNC: 103 MG/DL (ref 74–99)
HCT VFR BLD AUTO: 32.4 % (ref 36–46)
HGB BLD-MCNC: 11.1 G/DL (ref 12–16)
MAGNESIUM SERPL-MCNC: 1.67 MG/DL (ref 1.6–2.4)
MCH RBC QN AUTO: 31.3 PG (ref 26–34)
MCHC RBC AUTO-ENTMCNC: 34.3 G/DL (ref 32–36)
MCV RBC AUTO: 91 FL (ref 80–100)
NRBC BLD-RTO: 0 /100 WBCS (ref 0–0)
P AXIS: 35 DEGREES
P OFFSET: 191 MS
P ONSET: 137 MS
PHOSPHATE SERPL-MCNC: 2.4 MG/DL (ref 2.5–4.9)
PLATELET # BLD AUTO: 158 X10*3/UL (ref 150–450)
POTASSIUM SERPL-SCNC: 3.7 MMOL/L (ref 3.5–5.3)
PR INTERVAL: 184 MS
PROT SERPL-MCNC: 5.6 G/DL (ref 6.4–8.2)
Q ONSET: 229 MS
QRS COUNT: 10 BEATS
QRS DURATION: 74 MS
QT INTERVAL: 442 MS
QTC CALCULATION(BAZETT): 463 MS
QTC FREDERICIA: 456 MS
R AXIS: 42 DEGREES
RBC # BLD AUTO: 3.55 X10*6/UL (ref 4–5.2)
SODIUM SERPL-SCNC: 143 MMOL/L (ref 136–145)
T AXIS: 99 DEGREES
T OFFSET: 450 MS
VENTRICULAR RATE: 66 BPM
WBC # BLD AUTO: 2.5 X10*3/UL (ref 4.4–11.3)

## 2024-12-28 PROCEDURE — 2500000001 HC RX 250 WO HCPCS SELF ADMINISTERED DRUGS (ALT 637 FOR MEDICARE OP): Performed by: HOSPITALIST

## 2024-12-28 PROCEDURE — 80053 COMPREHEN METABOLIC PANEL: CPT | Performed by: NURSE PRACTITIONER

## 2024-12-28 PROCEDURE — 83735 ASSAY OF MAGNESIUM: CPT | Performed by: NURSE PRACTITIONER

## 2024-12-28 PROCEDURE — 84100 ASSAY OF PHOSPHORUS: CPT | Performed by: NURSE PRACTITIONER

## 2024-12-28 PROCEDURE — 85027 COMPLETE CBC AUTOMATED: CPT | Performed by: NURSE PRACTITIONER

## 2024-12-28 PROCEDURE — 99291 CRITICAL CARE FIRST HOUR: CPT | Performed by: NURSE PRACTITIONER

## 2024-12-28 PROCEDURE — 2500000002 HC RX 250 W HCPCS SELF ADMINISTERED DRUGS (ALT 637 FOR MEDICARE OP, ALT 636 FOR OP/ED): Performed by: NURSE PRACTITIONER

## 2024-12-28 PROCEDURE — 2500000004 HC RX 250 GENERAL PHARMACY W/ HCPCS (ALT 636 FOR OP/ED): Performed by: NURSE PRACTITIONER

## 2024-12-28 PROCEDURE — RXMED WILLOW AMBULATORY MEDICATION CHARGE

## 2024-12-28 PROCEDURE — 2500000001 HC RX 250 WO HCPCS SELF ADMINISTERED DRUGS (ALT 637 FOR MEDICARE OP): Performed by: NURSE PRACTITIONER

## 2024-12-28 PROCEDURE — 36415 COLL VENOUS BLD VENIPUNCTURE: CPT | Performed by: NURSE PRACTITIONER

## 2024-12-28 RX ORDER — OMEPRAZOLE 40 MG/1
40 CAPSULE, DELAYED RELEASE ORAL DAILY
Qty: 30 CAPSULE | Refills: 0 | Status: SHIPPED | OUTPATIENT
Start: 2024-12-28 | End: 2025-01-27

## 2024-12-28 RX ORDER — METOPROLOL TARTRATE 25 MG/1
25 TABLET, FILM COATED ORAL 2 TIMES DAILY
Status: DISCONTINUED | OUTPATIENT
Start: 2024-12-28 | End: 2024-12-28 | Stop reason: HOSPADM

## 2024-12-28 RX ORDER — MAGNESIUM SULFATE HEPTAHYDRATE 40 MG/ML
2 INJECTION, SOLUTION INTRAVENOUS ONCE
Status: COMPLETED | OUTPATIENT
Start: 2024-12-28 | End: 2024-12-28

## 2024-12-28 RX ORDER — POTASSIUM CHLORIDE 20 MEQ/1
40 TABLET, EXTENDED RELEASE ORAL ONCE
Status: COMPLETED | OUTPATIENT
Start: 2024-12-28 | End: 2024-12-28

## 2024-12-28 RX ADMIN — METOPROLOL TARTRATE 25 MG: 25 TABLET, FILM COATED ORAL at 08:08

## 2024-12-28 RX ADMIN — MAGNESIUM SULFATE HEPTAHYDRATE 2 G: 40 INJECTION, SOLUTION INTRAVENOUS at 08:08

## 2024-12-28 RX ADMIN — POTASSIUM CHLORIDE 40 MEQ: 1500 TABLET, EXTENDED RELEASE ORAL at 08:08

## 2024-12-28 RX ADMIN — APIXABAN 5 MG: 5 TABLET, FILM COATED ORAL at 08:08

## 2024-12-28 RX ADMIN — PANTOPRAZOLE SODIUM 40 MG: 40 TABLET, DELAYED RELEASE ORAL at 06:16

## 2024-12-28 RX ADMIN — CLOPIDOGREL 75 MG: 75 TABLET ORAL at 08:08

## 2024-12-28 RX ADMIN — Medication 500 MG: at 12:58

## 2024-12-28 RX ADMIN — Medication 500 MG: at 08:08

## 2024-12-28 ASSESSMENT — PAIN SCALES - GENERAL
PAINLEVEL_OUTOF10: 0 - NO PAIN

## 2024-12-28 ASSESSMENT — PAIN - FUNCTIONAL ASSESSMENT
PAIN_FUNCTIONAL_ASSESSMENT: 0-10

## 2024-12-28 NOTE — PROGRESS NOTES
"Nevaeh Fair is a 79 y.o. female on day 3 of admission presenting with Acute pulmonary embolism, unspecified pulmonary embolism type, unspecified whether acute cor pulmonale present (Multi).    Subjective   TAMEKA reported       Objective     Physical Exam  Constitutional:       General: She is not in acute distress.     Appearance: She is not ill-appearing.   Cardiovascular:      Rate and Rhythm: Normal rate and regular rhythm.      Pulses: Normal pulses.   Pulmonary:      Effort: Pulmonary effort is normal. No respiratory distress.      Breath sounds: No wheezing.   Abdominal:      General: There is no distension.      Palpations: Abdomen is soft.      Tenderness: There is no abdominal tenderness.   Musculoskeletal:         General: Normal range of motion.   Skin:     General: Skin is warm and dry.      Capillary Refill: Capillary refill takes less than 2 seconds.   Neurological:      General: No focal deficit present.      Mental Status: She is alert and oriented to person, place, and time. Mental status is at baseline.   Psychiatric:         Mood and Affect: Mood normal.         Last Recorded Vitals  Blood pressure 124/69, pulse 73, temperature 36 °C (96.8 °F), temperature source Temporal, resp. rate 18, height 1.651 m (5' 5\"), weight 67 kg (147 lb 11.3 oz), SpO2 96%.  Intake/Output last 3 Shifts:  I/O last 3 completed shifts:  In: 420 (6.3 mL/kg) [P.O.:240; I.V.:180 (2.7 mL/kg)]  Out: - (0 mL/kg)   Weight: 67 kg     Relevant Results    Scheduled medications  apixaban, 5 mg, oral, BID  clopidogrel, 75 mg, oral, Daily  pantoprazole, 40 mg, oral, Daily before breakfast  polyethylene glycol, 17 g, oral, Daily  QUEtiapine, 75 mg, oral, Nightly  rosuvastatin, 40 mg, oral, Nightly      Continuous medications     PRN medications  PRN medications: hydrALAZINE, HYDROcodone-acetaminophen, loperamide    Results for orders placed or performed during the hospital encounter of 12/25/24 (from the past 24 hours)   Renal Function " Panel   Result Value Ref Range    Glucose 96 74 - 99 mg/dL    Sodium 146 (H) 136 - 145 mmol/L    Potassium 3.4 (L) 3.5 - 5.3 mmol/L    Chloride 112 (H) 98 - 107 mmol/L    Bicarbonate 27 21 - 32 mmol/L    Anion Gap 10 10 - 20 mmol/L    Urea Nitrogen 6 6 - 23 mg/dL    Creatinine 0.66 0.50 - 1.05 mg/dL    eGFR 89 >60 mL/min/1.73m*2    Calcium 8.4 (L) 8.6 - 10.3 mg/dL    Phosphorus 2.5 2.5 - 4.9 mg/dL    Albumin 2.9 (L) 3.4 - 5.0 g/dL   Magnesium   Result Value Ref Range    Magnesium 1.94 1.60 - 2.40 mg/dL   Heparin Assay, UFH   Result Value Ref Range    Heparin Unfractionated 1.6 (HH) See Comment Below for Therapeutic Ranges IU/mL   POCT GLUCOSE   Result Value Ref Range    POCT Glucose 89 74 - 99 mg/dL   POCT GLUCOSE   Result Value Ref Range    POCT Glucose 124 (H) 74 - 99 mg/dL   CBC   Result Value Ref Range    WBC 2.5 (L) 4.4 - 11.3 x10*3/uL    nRBC 0.0 0.0 - 0.0 /100 WBCs    RBC 3.55 (L) 4.00 - 5.20 x10*6/uL    Hemoglobin 11.1 (L) 12.0 - 16.0 g/dL    Hematocrit 32.4 (L) 36.0 - 46.0 %    MCV 91 80 - 100 fL    MCH 31.3 26.0 - 34.0 pg    MCHC 34.3 32.0 - 36.0 g/dL    RDW 15.2 (H) 11.5 - 14.5 %    Platelets 158 150 - 450 x10*3/uL   Magnesium   Result Value Ref Range    Magnesium 1.67 1.60 - 2.40 mg/dL   Hepatic Function Panel   Result Value Ref Range    Albumin 3.0 (L) 3.4 - 5.0 g/dL    Bilirubin, Total 0.4 0.0 - 1.2 mg/dL    Bilirubin, Direct 0.0 0.0 - 0.3 mg/dL    Alkaline Phosphatase 80 33 - 136 U/L    ALT 8 7 - 45 U/L    AST 12 9 - 39 U/L    Total Protein 5.6 (L) 6.4 - 8.2 g/dL   Phosphorus   Result Value Ref Range    Phosphorus 2.4 (L) 2.5 - 4.9 mg/dL   Basic Metabolic Panel   Result Value Ref Range    Glucose 103 (H) 74 - 99 mg/dL    Sodium 143 136 - 145 mmol/L    Potassium 3.7 3.5 - 5.3 mmol/L    Chloride 113 (H) 98 - 107 mmol/L    Bicarbonate 23 21 - 32 mmol/L    Anion Gap 11 10 - 20 mmol/L    Urea Nitrogen 7 6 - 23 mg/dL    Creatinine 0.64 0.50 - 1.05 mg/dL    eGFR 89 >60 mL/min/1.73m*2    Calcium 8.5 (L) 8.6 -  10.3 mg/dL     Transthoracic Echo (TTE) Limited    Result Date: 12/26/2024   [facility], [address] [place] [address]              Tel [phone] and Fax [phone] TRANSTHORACIC ECHOCARDIOGRAM REPORT  Patient Name:       MAKENNA REA      Reading Physician:    [id number] Maverick Larkin  Study Date:         12/26/2024          Ordering Provider:    [id number] NURYS FRANCO MRN/PID:            [id number]            Fellow: Accession#:         [id number]        Nurse: Date of Birth/Age:  1945 / 79     Sonographer:          Jordon keenan Gender assigned at  F                   Additional Staff:     Nichol Bentley RDCS Birth: Height:             165.00 cm           Admit Date:           12/25/2024 Weight:             69.00 kg            Admission Status:     Inpatient -                                                               Routine BSA / BMI:          1.76 m2 / 25.34     Encounter#:           [id number]                     kg/m2 Blood Pressure:     159/94 mmHg         Department Location:  [facility]                                                               Invasive Study Type:    TRANSTHORACIC ECHO (TTE) LIMITED Diagnosis/ICD: Other pulmonary embolism without acute cor pulmonale-I26.99 Indication:    Pulm Embolism CPT Code:      Echo Limited-33733; Doppler Limited-18736; Color Doppler-55657 Patient History: Diabetes:          Yes Pertinent History: HTN, Hyperlipidemia, CAD and Pulmonary Embolism,                    Arteriosclerosis of coronary artery. Study Detail: The following Echo studies were performed: 2D, M-Mode, Doppler and               color flow. Technically challenging study due to prominent lung               artifact.  PHYSICIAN INTERPRETATION: Left Ventricle: Left ventricular ejection fraction is normal, calculated by  Andrews's biplane at 57%. There are no regional left ventricular wall motion abnormalities. The left ventricular cavity size is normal. There is normal septal and normal posterior left ventricular wall thickness. There is left ventricular concentric remodeling. Left ventricular diastolic filling was not assessed. Left Atrium: The left atrium was not assessed. Right Ventricle: The right ventricle is normal in size. There is normal right ventricular global systolic function. TAPSE = 18 mm. Right Atrium: The right atrium is normal in size. Aortic Valve: The aortic valve is trileaflet. There is no evidence of aortic valve regurgitation. Mitral Valve: The mitral valve is mildly thickened. There is trace mitral valve regurgitation. Tricuspid Valve: The tricuspid valve is structurally normal. There is trace to mild tricuspid regurgitation. The Doppler estimated RVSP is slightly elevated at 33.7 mmHg. Pulmonic Valve: The pulmonic valve is structurally normal. There is physiologic pulmonic valve regurgitation. Pericardium: There is no pericardial effusion noted. Aorta: The aortic root is normal. There is mild dilatation of the ascending aorta. Systemic Veins: The inferior vena cava appears normal in size. In comparison to the previous echocardiogram(s): Compared with study dated 8/6/2024, no significant change.  CONCLUSIONS:  1. Left ventricular ejection fraction is normal, calculated by Andrews's biplane at 57%.  2. There is normal right ventricular global systolic function.  3. Slightly elevated right ventricular systolic pressure. QUANTITATIVE DATA SUMMARY:  2D MEASUREMENTS:          Normal Ranges: IVSd:            0.92 cm  (0.6-1.1cm) LVPWd:           0.81 cm  (0.6-1.1cm) LVIDd:           2.82 cm  (3.9-5.9cm) LVIDs:           2.06 cm LV Mass Index:   34 g/m2 LVEDV Index:     33 ml/m2 LV % FS          27.0 %  RA VOLUME BY A/L METHOD:           Normal Ranges: RA Vol A4C:              12.3 ml   (8.3-19.5ml) RA Vol Index  A4C:        7.0 ml/m2 RA Area A4C:             6.6 cm2 RA Major Axis A4C:       3.0 cm  AORTA MEASUREMENTS:         Normal Ranges: Asc Ao, d:          3.80 cm (2.1-3.4cm)  LV SYSTOLIC FUNCTION BY 2D PLANIMETRY (MOD):                      Normal Ranges: EF-A4C View:    50 % (>=55%) EF-A2C View:    54 % EF-Biplane:     57 % LV EF Reported: 57 %  LV DIASTOLIC FUNCTION:             Normal Ranges: MV Peak E:             0.94 m/s    (0.7-1.2 m/s) MV Peak A:             0.64 m/s    (0.42-0.7 m/s) E/A Ratio:             1.47        (1.0-2.2) MV e'                  0.105 m/s   (>8.0) MV lateral e'          0.12 m/s MV medial e'           0.09 m/s MV A Dur:              150.33 msec E/e' Ratio:            8.92        (<8.0)  MITRAL VALVE:          Normal Ranges: MV DT:        200 msec (150-240msec)  RIGHT VENTRICLE: TAPSE: 18.5 mm RV s'  0.08 m/s  TRICUSPID VALVE/RVSP:          Normal Ranges: Peak TR Velocity:     2.77 m/s Est. RA Pressure:     3 mmHg RV Syst Pressure:     34 mmHg  (< 30mmHg) IVC Diam:             1.50 cm  PULMONIC VALVE:          Normal Ranges: PV Accel Time:  129 msec (>120ms) PV Max Pb:     0.8 m/s  (0.6-0.9m/s) PV Max P.7 mmHg PV Mean P.4 mmHg  AORTA: Asc Ao Diam 3.79 cm  [id number] Maverick Larkin DO Electronically signed on 2024 at 6:12:42 PM  ** Final **      Assessment/Plan   Assessment & Plan  Acute pulmonary embolism, unspecified pulmonary embolism type, unspecified whether acute cor pulmonale present (Multi)      This is an 78yo female with history of anxiety, CAD s/p CABG 2024, and HTN.  Admitted to [facility] with complaints of chills, fever, and hypotension.  Per patient she was on an airplane recently from [place].  ER work up revealed a large pulmonary embolus involving the right distal main pulmonary artery extending into the RLL an RUL branches and associated with right heart strain.  Additional work up showed Cl109, phos 2.3, H&H11.8, 35.0, plt 146, chest xray negative for any  acute process.  UA with 25Leu/nL, no bacteria, and 1-5WBCs.  Patient started on heparin infusion and then transferred to ICU for ongoing management of acute pulmonary embolism     Plan:     Neuro   Hx Anxiety  - Neuro checks per unit protocol  - Home seroquel  - Norco for acute pain     CV:   HTN, CAD s/p CABG 8/2024  Echo 12/26 --> LVEF 57%  - NIBP and tele-monitoring  - Plavix  - Crestor  - Ok to resume BB per cardiology  - Continue Eliquis  - Cardiology consulted, appreciate recs     Pulm:   - oxygenating well on RA  - Sats >90%  - Encourage cough and deep breathing     GI:   Pancreatic duct dilatation of 10mm --> Alk phos normal, patient asymptomatic, no complaints of abdominal pain.   - Regular diet  - Bowel regimen  - PPI  - Continue to monitor for symptoms if symptoms present will pursue further imaging.  No symptoms at this time.      Renal:   - Daily RFP and replete electrolytes as needed  - Monitor UO     Heme:   Acute pulmonary embolism;heparin stopped 12/27   - Daily CBC  - Continue Eliquis  - SCDs for DVTppx     ID:   No leukocytosis, afebrile, no s/s of active infection  - Trend temps  - Trend WBCs     Dispo: Likely DC home today    Discussed with Dr. Mckeon during am MDRs    I spent 60 minutes of critical care time in the professional and overall care of this patient.      GUDELIA Tao-CNP

## 2024-12-28 NOTE — PROGRESS NOTES
12/28/24 1121   Discharge Planning   Expected Discharge Disposition Home        Patient has active discharge order. She is going home with her , no needs identified.

## 2024-12-28 NOTE — DISCHARGE SUMMARY
Discharge Diagnosis  Acute pulmonary embolism, unspecified pulmonary embolism type, unspecified whether acute cor pulmonale present (Multi)    Issues Requiring Follow-Up  Follow up with PCP 1 week following discharge  Follow up with Vascular following discharge  Do not fly until cleared by PCP and or Vascular Physicians    Test Results Pending At Discharge  Pending Labs       Order Current Status    Blood Culture Preliminary result    Blood Culture Preliminary result            Hospital Course     This is an 78yo female with history of anxiety, CAD s/p CABG 8/2024, and HTN.  Admitted to [facility] with complaints of chills, fever, and hypotension.  Per patient she was on an airplane recently from [place].  ER work up revealed a large pulmonary embolus involving the right distal main pulmonary artery extending into the RLL an RUL branches and associated with right heart strain.  Additional work up showed Cl109, phos 2.3, H&H11.8, 35.0, plt 146, chest xray negative for any acute process.  UA with 25Leu/nL, no bacteria, and 1-5WBCs.  Patient started on heparin infusion and then transferred to ICU for ongoing management of acute pulmonary embolism     Plan:     Neuro   Hx Anxiety  - Neuro checks per unit protocol  - Home seroquel  - Norco for acute pain     CV:   HTN, CAD s/p CABG 8/2024  Echo 12/26 --> LVEF 57%  - NIBP and tele-monitoring  - Plavix  - Crestor  - Ok to resume BB per cardiology  - Continue Eliquis  - Cardiology consulted, appreciate recs     Pulm:   - oxygenating well on RA  - Sats >90%  - Encourage cough and deep breathing     GI:   Pancreatic duct dilatation of 10mm --> Alk phos normal, patient asymptomatic, no complaints of abdominal pain.   - Regular diet  - Bowel regimen  - PPI  - Continue to monitor for symptoms if symptoms present will pursue further imaging.  No symptoms at this time.      Renal:   - Daily RFP and replete electrolytes as needed  - Monitor UO     Heme:   Acute pulmonary embolism;heparin  stopped 12/27   - Daily CBC  - Continue Eliquis  - SCDs for DVTppx     ID:   No leukocytosis, afebrile, no s/s of active infection  - Trend temps  - Trend WBCs     Dispo: Likely DC home today     Discussed with Dr. Mckeon during am MDRs      Home Medications     Medication List      START taking these medications     apixaban 5 mg tablet; Commonly known as: Eliquis; Take 1 tablet (5 mg)   by mouth 2 times a day.     CHANGE how you take these medications     omeprazole 40 mg DR capsule; Commonly known as: PriLOSEC; Take 1 capsule   (40 mg) by mouth once daily.; What changed: when to take this     CONTINUE taking these medications     clopidogrel 75 mg tablet; Commonly known as: Plavix; Take 1 tablet (75   mg) by mouth once daily.   HYDROcodone-acetaminophen 5-325 mg tablet; Commonly known as: Norco   hydrocortisone 2.5 % cream; Apply topically 2 times a day as needed for   irritation or rash.   isosorbide mononitrate ER 30 mg 24 hr tablet; Commonly known as: Imdur   metoprolol tartrate 25 mg tablet; Commonly known as: Lopressor; Take 1   tablet (25 mg) by mouth 2 times a day.   Ozempic 0.25 mg or 0.5 mg (2 mg/3 mL) pen injector; Generic drug:   semaglutide; Inject 0.5 mg under the skin every 7 days.   QUEtiapine 25 mg tablet; Commonly known as: SEROquel; TAKE 3 TABLETS (75   MG) BY MOUTH ONCE DAILY AT BEDTIME.   rosuvastatin 40 mg tablet; Commonly known as: Crestor; TAKE 1 TABLET BY   MOUTH EVERYDAY AT BEDTIME     STOP taking these medications     venlafaxine XR 37.5 mg 24 hr capsule; Commonly known as: Effexor-XR       Outpatient Follow-Up  No future appointments.    Harshal Rodgers, APRN-CNP

## 2024-12-28 NOTE — DOCUMENTATION CLARIFICATION NOTE
"    PATIENT:               MAKENNA REA  ACCT #:                  [id number]  MRN:                       [id number]  :                       1945  ADMIT DATE:       2024 12:21 PM  DISCH DATE:        2024 1:28 PM  RESPONDING PROVIDER #:        [id number]          PROVIDER RESPONSE TEXT:    Pulmonary embolus with Acute Cor Pulmonale    CDI QUERY TEXT:    Clarification      Instruction:    Based on your assessment of the patient and the clinical information, please provide the requested documentation by clicking on the appropriate radio button and enter any additional information if prompted.    Question: Is there a diagnosis related to patient's PE    When answering this query, please exercise your independent professional judgment. The fact that a question is being asked, does not imply that any particular answer is desired or expected.    The patient's clinical indicators include:  Clinical Information: 79 year old woman with fatigue. Pt's diagnoses include acute PE and hypotension. Pt's PMH includes CAD.    Clinical Indicators:   Progress Notes: REX Rodgers  \"...ER work up revealed a large pulmonary embolus involving the right distal main pulmonary artery extending into the RLL an RUL branches and associated with right heart strain...\"     Cardiology Progress Notes: REX Al  \"...Cardiology is consulted for PE with right heart strain and oral AC recs...High sensitivity troponin negative x 2 ...OK to change to DOAC...\"    Echo(): LV EF 57(percent)...Normal RV global systolic function...Slightly elevated RV systolic pressure    Treatment:  -Heparin drip protocol -  -Eliquis 5mg twice daily: -ongoing  -O2 2L: -    Risk Factors: age, CAD  Options provided:  -- Pulmonary embolus with Acute Cor Pulmonale  -- Pulmonary embolus without Acute Cor Pulmonale  -- Other - I will add my own diagnosis  -- Refer to Clinical Documentation Reviewer    Query created " by: Bouchra Mars on 12/27/2024 2:59 PM      Electronically signed by:  NURYS GALEAS-CNP 12/28/2024 2:55 PM

## 2024-12-28 NOTE — CARE PLAN
The patient's goals for the shift include      The clinical goals for the shift include patient will remain HDS throughout the shift      Problem: Pain - Adult  Goal: Verbalizes/displays adequate comfort level or baseline comfort level  Outcome: Progressing     Problem: Safety - Adult  Goal: Free from fall injury  Outcome: Progressing     Problem: Discharge Planning  Goal: Discharge to home or other facility with appropriate resources  Outcome: Progressing     Problem: Chronic Conditions and Co-morbidities  Goal: Patient's chronic conditions and co-morbidity symptoms are monitored and maintained or improved  Outcome: Progressing     Problem: Diabetes  Goal: Achieve decreasing blood glucose levels by end of shift  Outcome: Progressing  Goal: Increase stability of blood glucose readings by end of shift  Outcome: Progressing  Goal: Decrease in ketones present in urine by end of shift  Outcome: Progressing  Goal: Maintain electrolyte levels within acceptable range throughout shift  Outcome: Progressing  Goal: Maintain glucose levels >70mg/dl to <250mg/dl throughout shift  Outcome: Progressing  Goal: No changes in neurological exam by end of shift  Outcome: Progressing  Goal: Learn about and adhere to nutrition recommendations by end of shift  Outcome: Progressing  Goal: Vital signs within normal range for age by end of shift  Outcome: Progressing  Goal: Increase self care and/or family involovement by end of shift  Outcome: Progressing  Goal: Receive DSME education by end of shift  Outcome: Progressing     Problem: Pain  Goal: Takes deep breaths with improved pain control throughout the shift  Outcome: Progressing  Goal: Turns in bed with improved pain control throughout the shift  Outcome: Progressing  Goal: Walks with improved pain control throughout the shift  Outcome: Progressing  Goal: Performs ADL's with improved pain control throughout shift  Outcome: Progressing  Goal: Participates in PT with improved pain  control throughout the shift  Outcome: Progressing  Goal: Free from opioid side effects throughout the shift  Outcome: Progressing  Goal: Free from acute confusion related to pain meds throughout the shift  Outcome: Progressing

## 2024-12-29 LAB
BACTERIA BLD CULT: NORMAL
BACTERIA BLD CULT: NORMAL

## 2024-12-30 LAB
BACTERIA BLD CULT: NORMAL
BACTERIA BLD CULT: NORMAL

## 2024-12-31 ENCOUNTER — TELEPHONE (OUTPATIENT)
Dept: PRIMARY CARE | Facility: CLINIC | Age: 79
End: 2024-12-31
Payer: MEDICARE

## 2024-12-31 ENCOUNTER — PATIENT OUTREACH (OUTPATIENT)
Dept: PRIMARY CARE | Facility: CLINIC | Age: 79
End: 2024-12-31
Payer: MEDICARE

## 2024-12-31 DIAGNOSIS — I26.99 ACUTE PULMONARY EMBOLISM, UNSPECIFIED PULMONARY EMBOLISM TYPE, UNSPECIFIED WHETHER ACUTE COR PULMONALE PRESENT (MULTI): ICD-10-CM

## 2024-12-31 DIAGNOSIS — Z09 HOSPITAL DISCHARGE FOLLOW-UP: ICD-10-CM

## 2024-12-31 NOTE — PROGRESS NOTES
TCM completed 12/30/24   Discharge Facility:  [facility]  Discharge Diagnosis: Acute pulmonary embolism, unspecified pulmonary embolism type, unspecified whether acute cor pulmonale present   Admission Date: 12/25/24  Discharge Date: 12/28/24    PCP Appointment Date: 1/16/2025  (Needs seen by: 1/14/25 to qualify for TCM)  Specialist Appointment Date: Cardiology- 1/15/25  Hospital Encounter and Summary Linked: Yes        Unable to reach patient within two business days after discharge. Voicemail left with contact information for patient to call back with any non-emergent questions or concerns. No return call as of this note.  Needs seen by: 1/14/25.

## 2024-12-31 NOTE — TELEPHONE ENCOUNTER
Patient called in to schedule hospital follow up,  was admitted at [facility] for blood clots in her lungs, her stay was 5 days long and was DC 12/28.  was told to follow up with PCP within a week. She only wants to see you. I got her scheduled for 1/16 first available that she could make but would like to be seen sooner if possible. Is there anywhere else we could get her in? I offered her 1/15 but she has another appointment that day and can't make it.

## 2025-01-08 ENCOUNTER — OFFICE VISIT (OUTPATIENT)
Dept: PRIMARY CARE | Facility: CLINIC | Age: 80
End: 2025-01-08
Payer: MEDICARE

## 2025-01-08 VITALS
WEIGHT: 153.6 LBS | HEIGHT: 65 IN | SYSTOLIC BLOOD PRESSURE: 112 MMHG | HEART RATE: 67 BPM | OXYGEN SATURATION: 94 % | BODY MASS INDEX: 25.59 KG/M2 | DIASTOLIC BLOOD PRESSURE: 62 MMHG

## 2025-01-08 DIAGNOSIS — R30.0 DYSURIA: ICD-10-CM

## 2025-01-08 DIAGNOSIS — E11.8 CONTROLLED TYPE 2 DIABETES MELLITUS WITH COMPLICATION, WITHOUT LONG-TERM CURRENT USE OF INSULIN (MULTI): ICD-10-CM

## 2025-01-08 DIAGNOSIS — N39.0 URINARY TRACT INFECTION WITHOUT HEMATURIA, SITE UNSPECIFIED: ICD-10-CM

## 2025-01-08 DIAGNOSIS — J34.89 RHINORRHEA: ICD-10-CM

## 2025-01-08 DIAGNOSIS — I26.94 MULTIPLE SUBSEGMENTAL PULMONARY EMBOLI WITHOUT ACUTE COR PULMONALE: Primary | ICD-10-CM

## 2025-01-08 LAB
POC APPEARANCE, URINE: CLEAR
POC BILIRUBIN, URINE: NEGATIVE
POC BLOOD, URINE: ABNORMAL
POC COLOR, URINE: ABNORMAL
POC GLUCOSE, URINE: ABNORMAL MG/DL
POC KETONES, URINE: NEGATIVE MG/DL
POC LEUKOCYTES, URINE: ABNORMAL
POC NITRITE,URINE: POSITIVE
POC PH, URINE: 5.5 PH
POC PROTEIN, URINE: ABNORMAL MG/DL
POC SPECIFIC GRAVITY, URINE: <=1.005
POC UROBILINOGEN, URINE: 1 EU/DL

## 2025-01-08 PROCEDURE — 3078F DIAST BP <80 MM HG: CPT | Performed by: STUDENT IN AN ORGANIZED HEALTH CARE EDUCATION/TRAINING PROGRAM

## 2025-01-08 PROCEDURE — 81003 URINALYSIS AUTO W/O SCOPE: CPT | Performed by: STUDENT IN AN ORGANIZED HEALTH CARE EDUCATION/TRAINING PROGRAM

## 2025-01-08 PROCEDURE — 1123F ACP DISCUSS/DSCN MKR DOCD: CPT | Performed by: STUDENT IN AN ORGANIZED HEALTH CARE EDUCATION/TRAINING PROGRAM

## 2025-01-08 PROCEDURE — 87086 URINE CULTURE/COLONY COUNT: CPT

## 2025-01-08 PROCEDURE — 1159F MED LIST DOCD IN RCRD: CPT | Performed by: STUDENT IN AN ORGANIZED HEALTH CARE EDUCATION/TRAINING PROGRAM

## 2025-01-08 PROCEDURE — 1111F DSCHRG MED/CURRENT MED MERGE: CPT | Performed by: STUDENT IN AN ORGANIZED HEALTH CARE EDUCATION/TRAINING PROGRAM

## 2025-01-08 PROCEDURE — 99495 TRANSJ CARE MGMT MOD F2F 14D: CPT | Performed by: STUDENT IN AN ORGANIZED HEALTH CARE EDUCATION/TRAINING PROGRAM

## 2025-01-08 PROCEDURE — 3074F SYST BP LT 130 MM HG: CPT | Performed by: STUDENT IN AN ORGANIZED HEALTH CARE EDUCATION/TRAINING PROGRAM

## 2025-01-08 RX ORDER — IPRATROPIUM BROMIDE 21 UG/1
2 SPRAY, METERED NASAL EVERY 12 HOURS
Qty: 30 ML | Refills: 12 | Status: SHIPPED | OUTPATIENT
Start: 2025-01-08 | End: 2026-01-08

## 2025-01-08 RX ORDER — CEPHALEXIN 500 MG/1
500 CAPSULE ORAL 2 TIMES DAILY
Qty: 10 CAPSULE | Refills: 0 | Status: SHIPPED | OUTPATIENT
Start: 2025-01-08 | End: 2025-01-13

## 2025-01-08 RX ORDER — NITROGLYCERIN 0.4 MG/1
TABLET SUBLINGUAL
COMMUNITY
Start: 2025-01-07

## 2025-01-08 NOTE — PROGRESS NOTES
Subjective   Patient ID: Nevaeh Fair is a 79 y.o. female who presents for Follow-up (PT IS HERE FOR A HOSPITAL FOLLOW UP FOR PE FROM [facility]. SHE WAS D/C'D ON 12/28. PT IS CURRENTLY HAVING BURNING WHEN URINATING.).    HPI     Pt was admitted from 12/25/24-12/28/24 at [facility] for pulmonary embolism in right distal main pulmonary artery extending into the RLL an RUL branches and associated with right heart strain. Pt was immediately started on heparin drip and started on eliquis outpt.  Echocardiogram- Left ventricular ejection fraction is normal, calculated by Andrews's biplane at 57%.   2. There is normal right ventricular global systolic function.   3. Slightly elevated right ventricular systolic pressure.     CT also made mention of infiltrates.   Extensive asymmetric reticular infiltrates involving the bilateral  right greater left lower lobes, right middle lobe, and right upper  lobe. Findings may be seen with sequela of aspiration or a  nonspecific pneumonitis.        Dysuria x 1 week  Took cranberry juice         Current Outpatient Medications:     apixaban (Eliquis) 5 mg tablet, Take 1 tablet (5 mg) by mouth 2 times a day., Disp: 60 tablet, Rfl: 1    clopidogrel (Plavix) 75 mg tablet, Take 1 tablet (75 mg) by mouth once daily., Disp: 30 tablet, Rfl: 11    hydrocortisone 2.5 % cream, Apply topically 2 times a day as needed for irritation or rash., Disp: 30 g, Rfl: 2    nitroglycerin (Nitrostat) 0.4 mg SL tablet, , Disp: , Rfl:     omeprazole (PriLOSEC) 40 mg DR capsule, Take 1 capsule (40 mg) by mouth once daily., Disp: 30 capsule, Rfl: 0    QUEtiapine (SEROquel) 25 mg tablet, TAKE 3 TABLETS (75 MG) BY MOUTH ONCE DAILY AT BEDTIME., Disp: 270 tablet, Rfl: 1    rosuvastatin (Crestor) 40 mg tablet, TAKE 1 TABLET BY MOUTH EVERYDAY AT BEDTIME, Disp: 90 tablet, Rfl: 0    semaglutide (Ozempic) 0.25 mg or 0.5 mg (2 mg/3 mL) pen injector, Inject 0.5 mg under the skin every 7 days., Disp: 9 mL, Rfl: 1    " HYDROcodone-acetaminophen (Norco) 5-325 mg tablet, Take 1 tablet by mouth every 6 hours if needed for pain. (Patient not taking: Reported on 1/8/2025), Disp: , Rfl:     isosorbide mononitrate ER (Imdur) 30 mg 24 hr tablet, TAKE 1 TABLET BY MOUTH DAILY - TAKE WITH WATER (SWALLOW WHOLE) (Patient not taking: Reported on 1/8/2025), Disp: , Rfl:     metoprolol tartrate (Lopressor) 25 mg tablet, Take 1 tablet (25 mg) by mouth 2 times a day., Disp: 60 tablet, Rfl: 0    Visit Vitals  /62   Pulse 67   Ht 1.651 m (5' 5\")   Wt 69.7 kg (153 lb 9.6 oz)   SpO2 94%   BMI 25.56 kg/m²   OB Status Menopausal   Smoking Status Former   BSA 1.79 m²         Objective   Physical Exam  Vitals reviewed.   Constitutional:       Appearance: Normal appearance.   Cardiovascular:      Rate and Rhythm: Normal rate and regular rhythm.      Heart sounds: No murmur heard.  Pulmonary:      Effort: Pulmonary effort is normal. No respiratory distress.      Breath sounds: Normal breath sounds. No wheezing.   Musculoskeletal:      Cervical back: Neck supple.      Left lower leg: No edema.   Neurological:      Mental Status: She is alert.         Assessment/Plan   Diagnoses and all orders for this visit:  Multiple subsegmental pulmonary emboli without acute cor pulmonale  -     Antithrombin III; Future  -     Plasminogen activator inhibitor; Future  -     Plasminogen activity; Future  -     Protein C activity; Future  -     Protein S activity; Future  -     Cardiolipin antibody; Future  Dysuria  -     POCT UA Automated manually resulted  -     cephalexin (Keflex) 500 mg capsule; Take 1 capsule (500 mg) by mouth 2 times a day for 5 days.  -     Urine Culture  Rhinorrhea  Urinary tract infection without hematuria, site unspecified  -     cephalexin (Keflex) 500 mg capsule; Take 1 capsule (500 mg) by mouth 2 times a day for 5 days.  Controlled type 2 diabetes mellitus with complication, without long-term current use of insulin (Multi)  -     " Hemoglobin A1C; Future    PMHx: anxiety, CAD s/p CABG 8/2024, and HTN     Hospital discharge follow up   12/25/24-12/28/24 at [facility]   Pulmonary embolism in right distal main pulmonary artery extending into the RLL an RUL branches and associated with right heart strain  Continue Eliquis 5mg BID   Likely 2/2 to recent CABG and flight        Anxiety/Insomnia/Depression -please follow Sleep hygiene instruction   Seroquel 25mg daily   Previous medications: venlafaxine, ramelteon 8mg at bedtime, off Xanax and Valium   Sleep as long as necessary to feel rested (usually seven to eight hours for adults) and then get out of bed  Maintain a regular sleep schedule, particularly a regular wake-up time in the morning  Try not to force sleep          Type 2 Diabetes today  A1c 6.1%, A1C ordered   Continue ozempic 0.5mg daily   sample given  - Recommended healthy diet- Mediterranean diet  - Recommended increased physical activity   - Limit alcohol consumption  - NO smoking   - Check your blood glucose readings before meals and at bedtime  - Please let us know if you have hypoglycemic symptoms such as tremor, dizziness, weakness, drowsiness, and confusion or altered mental status, palpitations and sweating and also blood sugar less than 70  - See ophthalmologist yearly base for retinopathy check   - Medications: aspirin 81 mg daily, rosuvastatin 40 mg daily,       B12 Defiencey   severe fatigue and peripheral neuropathy   B12 1000mcg given in office      Rhinorrhea   Pt stopped  ipratropium BID       Hypertension/coronary artery disease and 3 stent on 2015  CABG x3 (LIMA to LAD, right saphenous vein as a Y graft to ramus and OM) on 8/9/2024   Continue metoprolol 25 daily, isosorbide 30mg daily, plavix 75mg daily  - Sodium restriction 2400 mg per day  - seeing Cardiology- Dr. Reeves      Chronic Back pain 2/2 lumbar herniated disk   - s/p multiple injections  Previous medications:  tizanidine 4mg BID, lidocaine  patches   - seeing orthopedist -Dr. Fuchs- low back pain, hip pain, has been getting shots hip/back       Essential tremor-not on medication  - Neurology- Dr. Ventura- tremor, memory loss- MRI, EEG done in 2018. She no longer follows up.     GERD  refilled omeprazole 40mg daily       Health maintenance and Preventive Health Screening  - Colonoscopy:8/15/13 repeat 10 years, pt declines further testing  - Mammogram:Not interested  - Dexa Scan:Not interested       Beba Montgomery DO 01/08/25 2:24 PM

## 2025-01-09 LAB — BACTERIA UR CULT: NORMAL

## 2025-01-10 ENCOUNTER — TELEPHONE (OUTPATIENT)
Dept: CARDIAC REHAB | Facility: CLINIC | Age: 80
End: 2025-01-10
Payer: MEDICARE

## 2025-01-13 ENCOUNTER — LAB (OUTPATIENT)
Dept: LAB | Facility: LAB | Age: 80
End: 2025-01-13
Payer: MEDICARE

## 2025-01-13 DIAGNOSIS — I26.94 MULTIPLE SUBSEGMENTAL PULMONARY EMBOLI WITHOUT ACUTE COR PULMONALE: ICD-10-CM

## 2025-01-13 DIAGNOSIS — E11.8 CONTROLLED TYPE 2 DIABETES MELLITUS WITH COMPLICATION, WITHOUT LONG-TERM CURRENT USE OF INSULIN (MULTI): ICD-10-CM

## 2025-01-13 PROCEDURE — 85300 ANTITHROMBIN III ACTIVITY: CPT

## 2025-01-13 PROCEDURE — 85415 FIBRINOLYTIC PLASMINOGEN: CPT

## 2025-01-13 PROCEDURE — 83036 HEMOGLOBIN GLYCOSYLATED A1C: CPT

## 2025-01-13 PROCEDURE — 85306 CLOT INHIBIT PROT S FREE: CPT

## 2025-01-13 PROCEDURE — 36415 COLL VENOUS BLD VENIPUNCTURE: CPT

## 2025-01-13 PROCEDURE — 85303 CLOT INHIBIT PROT C ACTIVITY: CPT

## 2025-01-13 PROCEDURE — 86147 CARDIOLIPIN ANTIBODY EA IG: CPT

## 2025-01-13 PROCEDURE — 85420 FIBRINOLYTIC PLASMINOGEN: CPT

## 2025-01-14 LAB
AT III ACT/NOR PPP CHRO: 120 % ACTIVITY (ref 80–130)
CARDIOLIPIN IGA SERPL-ACNC: 1.3 APL U/ML
CARDIOLIPIN IGG SER IA-ACNC: <1.6 GPL U/ML
CARDIOLIPIN IGM SER IA-ACNC: 8.1 MPL U/ML
EST. AVERAGE GLUCOSE BLD GHB EST-MCNC: 114 MG/DL
HBA1C MFR BLD: 5.6 %
PROT C ACT/NOR PPP CHRO: 120 % ACTIVITY (ref 70–150)
PROT S ACT/NOR PPP: >150 % ACTIVITY (ref 64–150)

## 2025-01-15 ENCOUNTER — APPOINTMENT (OUTPATIENT)
Dept: CARDIOLOGY | Facility: CLINIC | Age: 80
End: 2025-01-15
Payer: MEDICARE

## 2025-01-16 ENCOUNTER — APPOINTMENT (OUTPATIENT)
Dept: PRIMARY CARE | Facility: CLINIC | Age: 80
End: 2025-01-16
Payer: MEDICARE

## 2025-01-16 LAB — PLG ACT/NOR PPP CHRO: 112 % (ref 75–140)

## 2025-01-21 DIAGNOSIS — I26.99 ACUTE PULMONARY EMBOLISM, UNSPECIFIED PULMONARY EMBOLISM TYPE, UNSPECIFIED WHETHER ACUTE COR PULMONALE PRESENT (MULTI): ICD-10-CM

## 2025-01-22 ENCOUNTER — APPOINTMENT (OUTPATIENT)
Dept: CARDIAC REHAB | Facility: CLINIC | Age: 80
End: 2025-01-22
Payer: MEDICARE

## 2025-01-22 LAB — PAI1 AG PPP IA-MCNC: 24.3 NG/ML (ref 3–72)

## 2025-01-23 ENCOUNTER — PATIENT OUTREACH (OUTPATIENT)
Dept: PRIMARY CARE | Facility: CLINIC | Age: 80
End: 2025-01-23
Payer: MEDICARE

## 2025-01-23 DIAGNOSIS — Z09 HOSPITAL DISCHARGE FOLLOW-UP: ICD-10-CM

## 2025-01-23 NOTE — PROGRESS NOTES
Unable to reach patient for TCM 14 day post discharge outreach.  Left a detailed voicemail with call back number for patient to call if needed. If no voicemail available, call attempts x 2 were made to contact the patient to assist with any questions or concerns patient may have. TCM to reassess patient needs 30 days post discharge.

## 2025-01-27 ENCOUNTER — APPOINTMENT (OUTPATIENT)
Dept: CARDIAC REHAB | Facility: CLINIC | Age: 80
End: 2025-01-27
Payer: MEDICARE

## 2025-01-30 DIAGNOSIS — J34.89 RHINORRHEA: ICD-10-CM

## 2025-01-30 RX ORDER — IPRATROPIUM BROMIDE 21 UG/1
2 SPRAY, METERED NASAL EVERY 12 HOURS
Qty: 90 ML | Refills: 1 | Status: SHIPPED | OUTPATIENT
Start: 2025-01-30 | End: 2026-01-30

## 2025-01-31 ENCOUNTER — DOCUMENTATION (OUTPATIENT)
Dept: PRIMARY CARE | Facility: CLINIC | Age: 80
End: 2025-01-31
Payer: MEDICARE

## 2025-01-31 NOTE — PROGRESS NOTES
TCM program closed 30 days post discharge due to inability to reach the patient via phone despite 2 voicemail messages left. Patient followed up with her PCP post discharge on 1/8/25. TCM closed per policy.

## 2025-02-04 ENCOUNTER — TELEPHONE (OUTPATIENT)
Dept: CARDIAC REHAB | Facility: CLINIC | Age: 80
End: 2025-02-04
Payer: MEDICARE

## 2025-02-10 ENCOUNTER — TELEPHONE (OUTPATIENT)
Dept: PRIMARY CARE | Facility: CLINIC | Age: 80
End: 2025-02-10
Payer: MEDICARE

## 2025-02-10 DIAGNOSIS — M54.50 CHRONIC LOW BACK PAIN WITHOUT SCIATICA, UNSPECIFIED BACK PAIN LATERALITY: Primary | ICD-10-CM

## 2025-02-10 DIAGNOSIS — G89.29 CHRONIC LOW BACK PAIN WITHOUT SCIATICA, UNSPECIFIED BACK PAIN LATERALITY: Primary | ICD-10-CM

## 2025-02-10 RX ORDER — LIDOCAINE 50 MG/G
1 PATCH TOPICAL DAILY
Qty: 10 PATCH | Refills: 2 | Status: SHIPPED | OUTPATIENT
Start: 2025-02-10 | End: 2026-02-10

## 2025-02-19 ENCOUNTER — APPOINTMENT (OUTPATIENT)
Dept: PRIMARY CARE | Facility: CLINIC | Age: 80
End: 2025-02-19
Payer: MEDICARE

## 2025-02-20 ENCOUNTER — APPOINTMENT (OUTPATIENT)
Dept: PRIMARY CARE | Facility: CLINIC | Age: 80
End: 2025-02-20
Payer: MEDICARE

## 2025-03-05 ENCOUNTER — TELEPHONE (OUTPATIENT)
Dept: PRIMARY CARE | Facility: CLINIC | Age: 80
End: 2025-03-05
Payer: MEDICARE

## 2025-03-05 DIAGNOSIS — K86.89 PANCREATIC DUCT DILATED (HHS-HCC): Primary | ICD-10-CM

## 2025-03-05 NOTE — TELEPHONE ENCOUNTER
----- Message from Beba Montgomery sent at 3/5/2025 10:46 AM EST -----  Regarding: FW: Pancreas Incidental Finding  I placed an order for an MRCP for this pt due to a dilatation that was seen while she was at the hospital for follow up. Please inform for her to get this completed  ----- Message -----  From: Jie Muse RN  Sent: 3/5/2025   7:35 AM EST  To: Leeann Smallwood PA-C; Beba Montgomery, DO  Subject: Pancreas Incidental Finding                      Hi Dr. Montgomery,    Your patient, Beba, had an incidental finding of dilatation of the main pancreatic duct up to 10 mm on imaging completed 12/25/2024 while hospitalized. Radiology has recommended a follow up MRCP or endoscopic ultrasound to be completed. Leeann Smallwood PA-C had reached out to you previously, she is attached to this email. The Incidental Findings team is notified in these circumstances to help ensure such findings are managed appropriately, and that any necessary follow-up consultation or diagnostic testing is completed in a timely manner.    You can order diagnostic testing through [facility] or submit a referral if you'd want to do so. Please let me know how you would like to proceed.     Thank you!      AMAN Arambula, RN, OCN  Oncology Incidental Findings Coordinator   Phone [phone]

## 2025-03-06 ENCOUNTER — TELEPHONE (OUTPATIENT)
Dept: PRIMARY CARE | Facility: CLINIC | Age: 80
End: 2025-03-06
Payer: MEDICARE

## 2025-03-06 DIAGNOSIS — G89.29 CHRONIC LOW BACK PAIN WITHOUT SCIATICA, UNSPECIFIED BACK PAIN LATERALITY: ICD-10-CM

## 2025-03-06 DIAGNOSIS — M54.50 CHRONIC LOW BACK PAIN WITHOUT SCIATICA, UNSPECIFIED BACK PAIN LATERALITY: ICD-10-CM

## 2025-03-06 RX ORDER — LIDOCAINE 50 MG/G
1 PATCH TOPICAL DAILY
Qty: 10 PATCH | Refills: 1 | Status: SHIPPED | OUTPATIENT
Start: 2025-03-06 | End: 2026-03-06

## 2025-03-07 ENCOUNTER — TELEPHONE (OUTPATIENT)
Dept: RADIOLOGY | Facility: HOSPITAL | Age: 80
End: 2025-03-07
Payer: MEDICARE

## 2025-03-07 NOTE — TELEPHONE ENCOUNTER
Nevaeh is listed on the incidental findings list based on imaging completed 12/25/2024 in which radiology noted pancreas duct dilation. EPIC inbox message sent to primary care provider, Dr. Beba Montgomery on 3/5/2025 to notify of the imaging. MRCP scheduled for 3/20/2025.

## 2025-03-11 ENCOUNTER — APPOINTMENT (OUTPATIENT)
Dept: PRIMARY CARE | Facility: CLINIC | Age: 80
End: 2025-03-11
Payer: MEDICARE

## 2025-03-11 VITALS
WEIGHT: 149.6 LBS | HEART RATE: 60 BPM | HEIGHT: 65 IN | BODY MASS INDEX: 24.93 KG/M2 | SYSTOLIC BLOOD PRESSURE: 132 MMHG | OXYGEN SATURATION: 98 % | DIASTOLIC BLOOD PRESSURE: 64 MMHG

## 2025-03-11 DIAGNOSIS — G89.29 CHRONIC LOW BACK PAIN WITHOUT SCIATICA, UNSPECIFIED BACK PAIN LATERALITY: ICD-10-CM

## 2025-03-11 DIAGNOSIS — K86.89 PANCREATIC DUCT DILATED (HHS-HCC): Primary | ICD-10-CM

## 2025-03-11 DIAGNOSIS — M54.50 CHRONIC LOW BACK PAIN WITHOUT SCIATICA, UNSPECIFIED BACK PAIN LATERALITY: ICD-10-CM

## 2025-03-11 PROCEDURE — 1123F ACP DISCUSS/DSCN MKR DOCD: CPT | Performed by: STUDENT IN AN ORGANIZED HEALTH CARE EDUCATION/TRAINING PROGRAM

## 2025-03-11 PROCEDURE — 3075F SYST BP GE 130 - 139MM HG: CPT | Performed by: STUDENT IN AN ORGANIZED HEALTH CARE EDUCATION/TRAINING PROGRAM

## 2025-03-11 PROCEDURE — 99213 OFFICE O/P EST LOW 20 MIN: CPT | Performed by: STUDENT IN AN ORGANIZED HEALTH CARE EDUCATION/TRAINING PROGRAM

## 2025-03-11 PROCEDURE — 3078F DIAST BP <80 MM HG: CPT | Performed by: STUDENT IN AN ORGANIZED HEALTH CARE EDUCATION/TRAINING PROGRAM

## 2025-03-11 PROCEDURE — 1159F MED LIST DOCD IN RCRD: CPT | Performed by: STUDENT IN AN ORGANIZED HEALTH CARE EDUCATION/TRAINING PROGRAM

## 2025-03-11 RX ORDER — TIZANIDINE 2 MG/1
2 TABLET ORAL DAILY PRN
Qty: 60 TABLET | Refills: 1 | Status: SHIPPED | OUTPATIENT
Start: 2025-03-11 | End: 2025-07-09

## 2025-03-11 RX ORDER — TIZANIDINE 2 MG/1
2 TABLET ORAL EVERY 6 HOURS PRN
Qty: 30 TABLET | Refills: 0 | Status: SHIPPED | OUTPATIENT
Start: 2025-03-11 | End: 2025-03-11

## 2025-03-11 RX ORDER — LIDOCAINE 50 MG/G
1 PATCH TOPICAL DAILY
Qty: 30 PATCH | Refills: 1 | Status: SHIPPED | OUTPATIENT
Start: 2025-03-11 | End: 2026-03-11

## 2025-03-11 RX ORDER — QUETIAPINE FUMARATE 50 MG/1
50 TABLET, FILM COATED ORAL NIGHTLY
COMMUNITY
Start: 2025-02-04 | End: 2025-03-11 | Stop reason: DRUGHIGH

## 2025-03-11 NOTE — PROGRESS NOTES
Subjective   Patient ID: Nevaeh Fair is a 79 y.o. female who presents for Follow-up (Pt wants to discuss what is going on her with pancrease and med refills.).  HPI   Pt following with Dr. Fuchs for her back pain   Patient here for f/u lumbar spine. Patient had Left L4 epi with Dr Tamayo 3/22/2024. Last appt she was referred to Dr Gaston and given Vicodin for pain. She states she saw Dr Alvarado and had 2 inj with him that provided some relief but was not able to have RFA due to having open heart sx. Since she cannot have RFA yet she wanted to f/u with Dr Fuchs. Her open heart sx was 08/09/2024 and she is now on Eliquis. She c/o pain with ambulation. States pain is left sided. Denies radicular symptoms. She states she feels &#34;sore&#34; all the time. States pain is worse when seated, feels best when standing.    Having pain in the back with radiation down to the anterior thigh  Having some spasms in her legs   Pt had some old tizanidine that she took   Trouble with sitting into the groin  Ortho MRI  Pt is interesting in steroid injections      Ozempic has caused some hair fall and she stopped    Current Outpatient Medications:     apixaban (Eliquis) 5 mg tablet, Take 1 tablet (5 mg) by mouth 2 times a day., Disp: 180 tablet, Rfl: 1    clopidogrel (Plavix) 75 mg tablet, Take 1 tablet (75 mg) by mouth once daily., Disp: 30 tablet, Rfl: 11    hydrocortisone 2.5 % cream, Apply topically 2 times a day as needed for irritation or rash., Disp: 30 g, Rfl: 2    ipratropium (Atrovent) 21 mcg (0.03 %) nasal spray, ADMINISTER 2 SPRAYS INTO EACH NOSTRIL EVERY 12 HOURS., Disp: 90 mL, Rfl: 1    lidocaine (Lidoderm) 5 % patch, Place 1 patch over 12 hours on the skin once daily. Apply to painful area 12 hours per day, remove for 12 hours., Disp: 10 patch, Rfl: 1    nitroglycerin (Nitrostat) 0.4 mg SL tablet, , Disp: , Rfl:     QUEtiapine (SEROquel) 25 mg tablet, TAKE 3 TABLETS (75 MG) BY MOUTH ONCE DAILY AT BEDTIME., Disp:  270 tablet, Rfl: 1    QUEtiapine (SEROquel) 50 mg tablet, Take 1 tablet (50 mg) by mouth once daily at bedtime., Disp: , Rfl:     rosuvastatin (Crestor) 40 mg tablet, TAKE 1 TABLET BY MOUTH EVERYDAY AT BEDTIME, Disp: 90 tablet, Rfl: 0    HYDROcodone-acetaminophen (Norco) 5-325 mg tablet, Take 1 tablet by mouth every 6 hours if needed for pain. (Patient not taking: Reported on 3/11/2025), Disp: , Rfl:     isosorbide mononitrate ER (Imdur) 30 mg 24 hr tablet, TAKE 1 TABLET BY MOUTH DAILY - TAKE WITH WATER (SWALLOW WHOLE) (Patient not taking: Reported on 3/11/2025), Disp: , Rfl:     metoprolol tartrate (Lopressor) 25 mg tablet, Take 1 tablet (25 mg) by mouth 2 times a day., Disp: 60 tablet, Rfl: 0    omeprazole (PriLOSEC) 40 mg DR capsule, Take 1 capsule (40 mg) by mouth once daily., Disp: 30 capsule, Rfl: 0    semaglutide (Ozempic) 0.25 mg or 0.5 mg (2 mg/3 mL) pen injector, Inject 0.5 mg under the skin every 7 days. (Patient not taking: Reported on 3/11/2025), Disp: 9 mL, Rfl: 1      Objective   Physical Exam  Vitals reviewed.   Constitutional:       Appearance: Normal appearance.   Cardiovascular:      Rate and Rhythm: Normal rate and regular rhythm.      Heart sounds: No murmur heard.  Pulmonary:      Effort: Pulmonary effort is normal. No respiratory distress.      Breath sounds: Normal breath sounds. No wheezing.   Musculoskeletal:      Cervical back: Neck supple.      Left lower leg: No edema.   Neurological:      Mental Status: She is alert.         Assessment/Plan   Diagnoses and all orders for this visit:  Chronic low back pain without sciatica, unspecified back pain laterality  -     lidocaine (Lidoderm) 5 % patch; Place 1 patch over 12 hours on the skin once daily. Apply to painful area 12 hours per day, remove for 12 hours.  -     tiZANidine (Zanaflex) 2 mg tablet; Take 1 tablet (2 mg) by mouth once daily as needed for muscle spasms.    PMHx: anxiety, CAD s/p CABG 8/2024, and HTN     Pancreatic duct  dilation   MRCP was recommended ordered   Discussed with pt the reasons why it was likely not done in the hospital and way we will follow up now as well        Hospital discharge follow up   12/25/24-12/28/24 at [facility]   Pulmonary embolism in right distal main pulmonary artery extending into the RLL an RUL branches and associated with right heart strain  Continue Eliquis 5mg BID   Likely 2/2 to recent CABG and flight         Anxiety/Insomnia/Depression -please follow Sleep hygiene instruction   Seroquel 75mg daily   Previous medications: venlafaxine, ramelteon 8mg at bedtime, off Xanax and Valium   Sleep as long as necessary to feel rested (usually seven to eight hours for adults) and then get out of bed  Maintain a regular sleep schedule, particularly a regular wake-up time in the morning  Try not to force sleep           Type 2 Diabetes today  A1c 6.1%, A1C ordered   Continue ozempic 0.5mg daily   sample given  - Recommended healthy diet- Mediterranean diet  - Recommended increased physical activity   - Limit alcohol consumption  - NO smoking   - Check your blood glucose readings before meals and at bedtime  - Please let us know if you have hypoglycemic symptoms such as tremor, dizziness, weakness, drowsiness, and confusion or altered mental status, palpitations and sweating and also blood sugar less than 70  - See ophthalmologist yearly base for retinopathy check   - Medications: aspirin 81 mg daily, rosuvastatin 40 mg daily,       B12 Defiencey   severe fatigue and peripheral neuropathy   B12 1000mcg given in office      Rhinorrhea   Pt stopped  ipratropium BID       Hypertension/coronary artery disease and 3 stent on 2015  CABG x3 (LIMA to LAD, right saphenous vein as a Y graft to ramus and OM) on 8/9/2024   Continue metoprolol 25 daily, isosorbide 30mg daily, plavix 75mg daily  - Sodium restriction 2400 mg per day  - seeing Cardiology- Dr. Reeves      Chronic Back pain 2/2 lumbar herniated  disk   - s/p multiple injections  Previous medications:  tizanidine 4mg BID, lidocaine patches   - seeing orthopedist -Dr. Fuchs- low back pain, hip pain, has been getting shots hip/back        Essential tremor-not on medication  - Neurology- Dr. Ventura- tremor, memory loss- MRI, EEG done in 2018. She no longer follows up.     GERD  refilled omeprazole 40mg daily       Health maintenance and Preventive Health Screening  - Colonoscopy:8/15/13 repeat 10 years, pt declines further testing  - Mammogram:Not interested  - Dexa Scan:Not interested     Beba Montgomery DO 01/08/25 2:24 PM        Beba Montgomery DO 03/11/25 4:00 PM

## 2025-03-20 ENCOUNTER — HOSPITAL ENCOUNTER (OUTPATIENT)
Dept: RADIOLOGY | Facility: CLINIC | Age: 80
Discharge: HOME | End: 2025-03-20
Payer: MEDICARE

## 2025-03-20 DIAGNOSIS — K86.89 PANCREATIC DUCT DILATED (HHS-HCC): ICD-10-CM

## 2025-03-20 PROCEDURE — A9575 INJ GADOTERATE MEGLUMI 0.1ML: HCPCS | Performed by: STUDENT IN AN ORGANIZED HEALTH CARE EDUCATION/TRAINING PROGRAM

## 2025-03-20 PROCEDURE — 74183 MRI ABD W/O CNTR FLWD CNTR: CPT

## 2025-03-20 PROCEDURE — 2550000001 HC RX 255 CONTRASTS: Performed by: STUDENT IN AN ORGANIZED HEALTH CARE EDUCATION/TRAINING PROGRAM

## 2025-03-20 RX ORDER — GADOTERATE MEGLUMINE 376.9 MG/ML
14 INJECTION INTRAVENOUS
Status: COMPLETED | OUTPATIENT
Start: 2025-03-20 | End: 2025-03-20

## 2025-03-20 RX ADMIN — GADOTERATE MEGLUMINE 14 ML: 376.9 INJECTION INTRAVENOUS at 11:56

## 2025-03-24 ENCOUNTER — TELEPHONE (OUTPATIENT)
Dept: PRIMARY CARE | Facility: CLINIC | Age: 80
End: 2025-03-24
Payer: MEDICARE

## 2025-03-27 DIAGNOSIS — K86.89 PANCREATIC DUCT DILATED (HHS-HCC): Primary | ICD-10-CM

## 2025-04-04 ENCOUNTER — APPOINTMENT (OUTPATIENT)
Dept: PRIMARY CARE | Facility: CLINIC | Age: 80
End: 2025-04-04
Payer: MEDICARE

## 2025-04-04 DIAGNOSIS — R35.0 URINARY FREQUENCY: Primary | ICD-10-CM

## 2025-04-04 NOTE — PROGRESS NOTES
Nevaeh Fair is a 79 y.o. female pt of Dr. Montgomery who presents today for an acute sick visit    No chief complaint on file.      Symptoms ***    Symptom onset has been {gen onset/course:337042} for a time period of *** {gen duration:296583}.     Severity is described as {gen severity:875688}.     Course of her symptoms over time is {gen onset/course:851831}.    Has taken ***     Allergies  No Known Allergies    Review of Systems  ROS was completed and all systems are negative with the exception of what was noted in the the HPI.       Objective     Most Recent Vitals  There were no vitals taken for this visit.      Current Medications  Current Outpatient Medications   Medication Instructions    apixaban (ELIQUIS) 5 mg, oral, 2 times daily    clopidogrel (PLAVIX) 75 mg, oral, Daily    hydrocortisone 2.5 % cream Topical, 2 times daily PRN    ipratropium (Atrovent) 21 mcg (0.03 %) nasal spray 2 sprays, Each Nostril, Every 12 hours    isosorbide mononitrate ER (Imdur) 30 mg 24 hr tablet TAKE 1 TABLET BY MOUTH DAILY - TAKE WITH WATER (SWALLOW WHOLE)    lidocaine (Lidoderm) 5 % patch 1 patch, transdermal, Daily, Apply to painful area 12 hours per day, remove for 12 hours.    metoprolol tartrate (LOPRESSOR) 25 mg, oral, 2 times daily    nitroglycerin (Nitrostat) 0.4 mg SL tablet     omeprazole (PRILOSEC) 40 mg, oral, Daily    QUEtiapine (SEROQUEL) 75 mg, oral, Nightly    rosuvastatin (CRESTOR) 40 mg, oral, Nightly    tiZANidine (ZANAFLEX) 2 mg, oral, Daily PRN         Physical Exam      Assessment & Plan

## 2025-04-07 ENCOUNTER — TELEPHONE (OUTPATIENT)
Dept: SURGICAL ONCOLOGY | Facility: CLINIC | Age: 80
End: 2025-04-07
Payer: MEDICARE

## 2025-04-07 NOTE — TELEPHONE ENCOUNTER
Called patient to schedule new patient visit, patient was unable to reach the phone. Left a voicemail with my information.    Christina Bolden MA

## 2025-04-10 ENCOUNTER — OFFICE VISIT (OUTPATIENT)
Dept: CARDIOLOGY | Facility: CLINIC | Age: 80
End: 2025-04-10
Payer: MEDICARE

## 2025-04-10 VITALS
SYSTOLIC BLOOD PRESSURE: 102 MMHG | DIASTOLIC BLOOD PRESSURE: 64 MMHG | WEIGHT: 152 LBS | OXYGEN SATURATION: 97 % | BODY MASS INDEX: 26.93 KG/M2 | HEART RATE: 57 BPM

## 2025-04-10 DIAGNOSIS — I26.99 ACUTE PULMONARY EMBOLISM, UNSPECIFIED PULMONARY EMBOLISM TYPE, UNSPECIFIED WHETHER ACUTE COR PULMONALE PRESENT (MULTI): ICD-10-CM

## 2025-04-10 PROCEDURE — 3078F DIAST BP <80 MM HG: CPT | Performed by: INTERNAL MEDICINE

## 2025-04-10 PROCEDURE — 99204 OFFICE O/P NEW MOD 45 MIN: CPT | Performed by: INTERNAL MEDICINE

## 2025-04-10 PROCEDURE — 1160F RVW MEDS BY RX/DR IN RCRD: CPT | Performed by: INTERNAL MEDICINE

## 2025-04-10 PROCEDURE — 1036F TOBACCO NON-USER: CPT | Performed by: INTERNAL MEDICINE

## 2025-04-10 PROCEDURE — 1123F ACP DISCUSS/DSCN MKR DOCD: CPT | Performed by: INTERNAL MEDICINE

## 2025-04-10 PROCEDURE — 3074F SYST BP LT 130 MM HG: CPT | Performed by: INTERNAL MEDICINE

## 2025-04-10 PROCEDURE — 99214 OFFICE O/P EST MOD 30 MIN: CPT | Performed by: INTERNAL MEDICINE

## 2025-04-10 PROCEDURE — 1159F MED LIST DOCD IN RCRD: CPT | Performed by: INTERNAL MEDICINE

## 2025-04-10 NOTE — PROGRESS NOTES
Referred by Darleen Al CNP      History of Present Illness:  Nevaeh Fair is a/an 79 y.o. woman with history of CABG 8/2024, subarachnoid hemorrhage 2011 admitted at [facility] in late December with distal right main pulmonary embolism with no RV strain. She was treated with anticoagulation alone.    She had traveled to [place] in November and December    Had a head cold in September but had persistent cough after that.    She is NOT up to date on age/gender-appropriate malignancy screening. She had a colonoscopy once years ago and she thinks one mammogram but it was uncomfortable and she never went back.    She has anemia that is new--seen on CBC at hospital admission; she had anemia at CABG that recovered.    Past Medical History:   Diagnosis Date    Abdominal cramping     Anxiety     Coronary artery disease     Familial hypercholesterolemia 04/17/2014    Essential familial hypercholesterolemia    Hypertension     Presence of intraocular lens 07/10/2014    Pseudophakia, left eye     Past Surgical History:   Procedure Laterality Date    CARDIAC CATHETERIZATION N/A 07/10/2024    Procedure: Left Heart Cath with Coronary Angiography and LV;  Surgeon: Aditya Trejo MD;  Location: [facility] Cardiac Cath Lab;  Service: Cardiovascular;  Laterality: N/A;  7/10/24; 930 am for [facility] [address], CAD with angina I25.119 and hx stent Z98.61  [facility] medicare:  auth # [id number] 7/4/24-08/18/24    CATARACT EXTRACTION      COLONOSCOPY      ESOPHAGOGASTRODUODENOSCOPY       Social History     Tobacco Use    Smoking status: Former     Types: Cigarettes    Smokeless tobacco: Never   Vaping Use    Vaping status: Never Used   Substance Use Topics    Alcohol use: Never    Drug use: Never     Family History   Problem Relation Name Age of Onset    Heart disease Mother      Coronary artery disease Mother      Heart disease Father      Heart attack Father      Hypertension Sister       Current Outpatient Medications   Medication Sig  Dispense Refill    apixaban (Eliquis) 5 mg tablet Take 1 tablet (5 mg) by mouth 2 times a day. 180 tablet 1    clopidogrel (Plavix) 75 mg tablet Take 1 tablet (75 mg) by mouth once daily. 30 tablet 11    hydrocortisone 2.5 % cream Apply topically 2 times a day as needed for irritation or rash. 30 g 2    ipratropium (Atrovent) 21 mcg (0.03 %) nasal spray ADMINISTER 2 SPRAYS INTO EACH NOSTRIL EVERY 12 HOURS. 90 mL 1    lidocaine (Lidoderm) 5 % patch Place 1 patch over 12 hours on the skin once daily. Apply to painful area 12 hours per day, remove for 12 hours. 30 patch 1    metoprolol tartrate (Lopressor) 25 mg tablet Take 1 tablet (25 mg) by mouth 2 times a day. 60 tablet 0    nitroglycerin (Nitrostat) 0.4 mg SL tablet       omeprazole (PriLOSEC) 40 mg DR capsule Take 1 capsule (40 mg) by mouth once daily. 30 capsule 0    QUEtiapine (SEROquel) 25 mg tablet TAKE 3 TABLETS (75 MG) BY MOUTH ONCE DAILY AT BEDTIME. 270 tablet 1    rosuvastatin (Crestor) 40 mg tablet TAKE 1 TABLET BY MOUTH EVERYDAY AT BEDTIME 90 tablet 0    tiZANidine (Zanaflex) 2 mg tablet Take 1 tablet (2 mg) by mouth once daily as needed for muscle spasms. 60 tablet 1    isosorbide mononitrate ER (Imdur) 30 mg 24 hr tablet TAKE 1 TABLET BY MOUTH DAILY - TAKE WITH WATER (SWALLOW WHOLE) (Patient not taking: Reported on 1/8/2025)       No current facility-administered medications for this visit.       Physical Examination:  Blood pressure 102/64, pulse 57, weight 68.9 kg (152 lb), SpO2 97%.  General: well-developed, well-nourished, no distress  Head: normocephalic, atraumatic  Eyes: PERRL, anicteric sclerae, no conjunctival injection  ENT: normal oropharynx  Neck: supple, no carotid bruits, no JVD  Lungs: normal respiratory effort, clear to auscultation bilaterally  Heart: regular, normal S1 and S2, no murmurs, rubs or gallops  Abdomen: normal active bowel sounds, soft, non-distended, non-tender  Extremities: no cyanosis or clubbing  Vascular: no edema,  pulses 2+ and symmetric  Musculoskeletal: no deformities  Neurological: alert and oriented, no gross neurological deficits  Psychological: normal mood and affect   Skin: warm and dry, no rashes or lesions        Pertinent Labs:    Pertinent Imaging:  CTA chest 12/25/2024  CHEST:  1.  Pulmonary embolus involving the right distal main pulmonary  artery extending into the right lower lobe and right upper lobar  branches as above. Associated borderline right heart strain pattern.  2. Cardiomegaly.  3. Extensive asymmetric reticular infiltrates involving the bilateral  right greater left lower lobes, right middle lobe, and right upper  lobe. Findings may be seen with sequela of aspiration or a  nonspecific pneumonitis.      ABDOMEN-PELVIS:  1.  No acute subdiaphragmatic abnormality.  2. Abnormal dilatation of the main pancreatic duct up to 10 mm.  Follow-up nonemergent pancreatic mass protocol MRI or endoscopic  ultrasound is advised for further assessment.  3. Colonic diverticulosis without evidence of acute diverticulitis.  4. Extensive atherosclerotic changes noted about the aortoiliac axis  with aneurysmal dilatation of the right greater left internal iliac  arteries as above.    Diagnoses and all orders for this visit:  Acute pulmonary embolism, unspecified pulmonary embolism type, unspecified whether acute cor pulmonale present (Multi)  -     Referral to Vascular Medicine  Continue anticoagulation for a minimum of 6 months  I would favor indefinite anticoagulation, likely with reduced dose, given weak provokers      Violet Braxton MD, MS

## 2025-04-15 ENCOUNTER — TELEPHONE (OUTPATIENT)
Dept: SURGICAL ONCOLOGY | Facility: CLINIC | Age: 80
End: 2025-04-15
Payer: MEDICARE

## 2025-04-15 NOTE — TELEPHONE ENCOUNTER
Called patient to schedule visit with Leeann Smallwood, Patient was unable to reach their phone. Left a voicemail with my information.    Christina Bolden MA

## 2025-04-16 ENCOUNTER — TELEPHONE (OUTPATIENT)
Dept: PRIMARY CARE | Facility: CLINIC | Age: 80
End: 2025-04-16
Payer: MEDICARE

## 2025-04-24 NOTE — PROGRESS NOTES
Subjective   Ms. Fair is an 79-year-old female who is here to discuss abnormal pancreas imaging.    She was admitted to [facility] [place] 2[place] in December of 2024 and treated for a large pulmonary embolism involving the right distal main pulmonary artery. As part of that work-up, she had a CT Angio CAP on 12/25/24 that showed an incidental finding of abnormal dilatation of the MPD up to 1.0 cm in the head of the pancreas. Her imaging came up on our incidental findings radiology review. I messaged her PCP but did not get a response thus contacted the patient directly.    She has since had a MRI/MRCP on 3/20/25 that describes abnormal dilatation of the MPD up to 0.9 cm in the head (0.3 cm in the tail) with a region of focal narrowing at the ampulla.      She denies a personal history of acute pancreatitis. She denies chronic abdominal pain, early satiety, poor appetite, jaundice or unintentional weight loss.     Medical and surgical history: CAD s/p CABG 2024 on Plavix, SAH 2011, PE 2025 on Eliquis, HTN, low back pain.  Family history: No PDAC.  Social history: Former smoker. No alcohol use. No illicits.     Objective     There were no vitals taken for this visit.     Physical Exam  General: in no acute distress, comfortable   Eyes: no pallor or scleral icterus  Respiratory: even, unlabored  Gastrointestinal: non-distended, abdomen soft, non-tender, no masses   Musculoskeletal: normal gate, no deformities   Integumentary: no concerning lesions, no jaundice  Neurologic: no gross deficits  Psychiatric: cognition intact, mood appropriate    Assessment/Plan   Ms. Fair is an 79-year-old female who is here to discuss abnormal pancreas imaging.    PLAN: She had a CT A/P while inpatient in December that populated on our incidental findings radiology review. At that time, her main pancreatic duct was measured at 10 mm in the head of the pancreas which is considered high risk stigmata.    She has since had a MRI/MRCP which  was reviewed at our multidisciplinary pancreas conference prior our visit today. The main pancreatic duct is diffusely dilated, all the way to the ampulla. On MRCP sequence, this measures approximately 7 mm. This could be age-related dilatation vs less likely a main-duct IPMN. The CBD is normal, 6-7 mm. There is no visualized ampullary lesion, no diffusion restriction.    I discussed surveillance with MRI/MRCP in 1 year. She is agreeable. I will contact her when she is due. I will also cancel her upcoming appointment with GI in September.       Leeann Smallwood PA-C

## 2025-04-25 ENCOUNTER — OFFICE VISIT (OUTPATIENT)
Dept: SURGICAL ONCOLOGY | Facility: CLINIC | Age: 80
End: 2025-04-25
Payer: MEDICARE

## 2025-04-25 VITALS
BODY MASS INDEX: 25.79 KG/M2 | WEIGHT: 154.8 LBS | DIASTOLIC BLOOD PRESSURE: 75 MMHG | HEART RATE: 66 BPM | HEIGHT: 65 IN | SYSTOLIC BLOOD PRESSURE: 117 MMHG

## 2025-04-25 DIAGNOSIS — Q45.3 ABNORMALITY OF PANCREATIC DUCT: Primary | ICD-10-CM

## 2025-04-25 PROCEDURE — 3078F DIAST BP <80 MM HG: CPT | Performed by: PHYSICIAN ASSISTANT

## 2025-04-25 PROCEDURE — 3074F SYST BP LT 130 MM HG: CPT | Performed by: PHYSICIAN ASSISTANT

## 2025-04-25 PROCEDURE — 1159F MED LIST DOCD IN RCRD: CPT | Performed by: PHYSICIAN ASSISTANT

## 2025-04-25 PROCEDURE — 99214 OFFICE O/P EST MOD 30 MIN: CPT | Performed by: PHYSICIAN ASSISTANT

## 2025-04-25 PROCEDURE — 99204 OFFICE O/P NEW MOD 45 MIN: CPT | Performed by: PHYSICIAN ASSISTANT

## 2025-04-25 PROCEDURE — 1123F ACP DISCUSS/DSCN MKR DOCD: CPT | Performed by: PHYSICIAN ASSISTANT

## 2025-04-25 PROCEDURE — 1126F AMNT PAIN NOTED NONE PRSNT: CPT | Performed by: PHYSICIAN ASSISTANT

## 2025-04-25 ASSESSMENT — PAIN SCALES - GENERAL: PAINLEVEL_OUTOF10: 0-NO PAIN

## 2025-04-28 DIAGNOSIS — R10.9 ABDOMINAL CRAMPING: ICD-10-CM

## 2025-04-28 RX ORDER — DICYCLOMINE HYDROCHLORIDE 10 MG/1
10 CAPSULE ORAL 4 TIMES DAILY
Qty: 360 CAPSULE | Refills: 0 | Status: SHIPPED | OUTPATIENT
Start: 2025-04-28 | End: 2025-07-27

## 2025-05-02 ENCOUNTER — APPOINTMENT (OUTPATIENT)
Dept: SURGICAL ONCOLOGY | Facility: CLINIC | Age: 80
End: 2025-05-02
Payer: MEDICARE

## 2025-05-22 DIAGNOSIS — G89.29 CHRONIC LOW BACK PAIN WITHOUT SCIATICA, UNSPECIFIED BACK PAIN LATERALITY: ICD-10-CM

## 2025-05-22 DIAGNOSIS — M54.50 CHRONIC LOW BACK PAIN WITHOUT SCIATICA, UNSPECIFIED BACK PAIN LATERALITY: ICD-10-CM

## 2025-05-22 RX ORDER — LIDOCAINE 50 MG/G
PATCH TOPICAL
Qty: 30 PATCH | Refills: 1 | Status: SHIPPED | OUTPATIENT
Start: 2025-05-22

## 2025-05-22 RX ORDER — TIZANIDINE 2 MG/1
2 TABLET ORAL DAILY PRN
Qty: 60 TABLET | Refills: 1 | Status: SHIPPED | OUTPATIENT
Start: 2025-05-22 | End: 2025-09-19

## 2025-06-13 ENCOUNTER — APPOINTMENT (OUTPATIENT)
Dept: UROLOGY | Facility: CLINIC | Age: 80
End: 2025-06-13
Payer: MEDICARE

## 2025-06-13 VITALS — TEMPERATURE: 96.4 F | BODY MASS INDEX: 26.66 KG/M2 | WEIGHT: 160 LBS | HEIGHT: 65 IN

## 2025-06-13 DIAGNOSIS — N39.0 RECURRENT UTI: Primary | ICD-10-CM

## 2025-06-13 PROBLEM — N32.81 OVERACTIVE BLADDER: Status: RESOLVED | Noted: 2023-08-21 | Resolved: 2025-06-13

## 2025-06-13 PROBLEM — E11.9 DIABETES MELLITUS TYPE 2, CONTROLLED (MULTI): Status: RESOLVED | Noted: 2023-06-08 | Resolved: 2025-06-13

## 2025-06-13 PROCEDURE — G2211 COMPLEX E/M VISIT ADD ON: HCPCS | Performed by: STUDENT IN AN ORGANIZED HEALTH CARE EDUCATION/TRAINING PROGRAM

## 2025-06-13 PROCEDURE — 1036F TOBACCO NON-USER: CPT | Performed by: STUDENT IN AN ORGANIZED HEALTH CARE EDUCATION/TRAINING PROGRAM

## 2025-06-13 PROCEDURE — 1159F MED LIST DOCD IN RCRD: CPT | Performed by: STUDENT IN AN ORGANIZED HEALTH CARE EDUCATION/TRAINING PROGRAM

## 2025-06-13 PROCEDURE — 99204 OFFICE O/P NEW MOD 45 MIN: CPT | Performed by: STUDENT IN AN ORGANIZED HEALTH CARE EDUCATION/TRAINING PROGRAM

## 2025-06-13 PROCEDURE — 1126F AMNT PAIN NOTED NONE PRSNT: CPT | Performed by: STUDENT IN AN ORGANIZED HEALTH CARE EDUCATION/TRAINING PROGRAM

## 2025-06-13 RX ORDER — ESTRADIOL 0.1 MG/G
CREAM VAGINAL
Qty: 42.5 G | Refills: 11 | Status: SHIPPED | OUTPATIENT
Start: 2025-06-13 | End: 2026-06-13

## 2025-06-13 ASSESSMENT — ENCOUNTER SYMPTOMS: FEVER: 0

## 2025-06-13 ASSESSMENT — PAIN SCALES - GENERAL: PAINLEVEL_OUTOF10: 0-NO PAIN

## 2025-06-13 NOTE — PROGRESS NOTES
History Of Present Illness  Nevaeh Fair is a 79 y.o. female presenting with recurrent UTI in the context of postmenopausal status. Currently on an antibiotic for UTI. Has been going to urgent care 4 times in past 6 months, thinks they have not been dong cultures just urinalysis. Also notes a little bit of stress incontinence with laugh or strain but she is not bothered by it. No urgency.    Positive urine cx: none for reference  Negative urine cx: Jan 2025, Dec 2024    Renal function: wnl Dec 2024  A1C: 5.6 Jan 2025  Renal stone hx: no       Past Medical History  She has a past medical history of Abdominal cramping, Anxiety, Coronary artery disease, Familial hypercholesterolemia (04/17/2014), Hypertension, and Presence of intraocular lens (07/10/2014).    Surgical History  She has a past surgical history that includes Cataract extraction; Cardiac catheterization (N/A, 07/10/2024); Colonoscopy; and Esophagogastroduodenoscopy.     Social History  She reports that she has quit smoking. Her smoking use included cigarettes. She has never used smokeless tobacco. She reports that she does not drink alcohol and does not use drugs.    Family History  Family History[1]     Allergies  Patient has no known allergies.    Review of Systems   Constitutional:  Negative for fever.   All other systems reviewed and are negative.       Physical Exam   Con: awake, alert, NAD  HEENT: normocephalic, speech normal  CV: no peripheral edema  Resp: no increased work of breathing  Neuro: normal mentation  Psych: mood normal  Skin: no rash      Last Recorded Vitals  There were no vitals taken for this visit.    Relevant Results    No results found for this or any previous visit (from the past 24 hours).       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

## 2025-06-18 ENCOUNTER — CLINICAL SUPPORT (OUTPATIENT)
Dept: CARDIAC REHAB | Facility: CLINIC | Age: 80
End: 2025-06-18
Payer: MEDICARE

## 2025-06-18 VITALS
WEIGHT: 157.8 LBS | SYSTOLIC BLOOD PRESSURE: 126 MMHG | RESPIRATION RATE: 14 BRPM | BODY MASS INDEX: 26.29 KG/M2 | HEIGHT: 65 IN | DIASTOLIC BLOOD PRESSURE: 58 MMHG | OXYGEN SATURATION: 97 % | HEART RATE: 61 BPM

## 2025-06-18 DIAGNOSIS — R10.9 ABDOMINAL CRAMPING: ICD-10-CM

## 2025-06-18 DIAGNOSIS — Z95.1 STATUS POST CORONARY ARTERY BYPASS GRAFT: ICD-10-CM

## 2025-06-18 RX ORDER — DICYCLOMINE HYDROCHLORIDE 10 MG/1
10 CAPSULE ORAL 4 TIMES DAILY
Qty: 360 CAPSULE | Refills: 0 | Status: SHIPPED | OUTPATIENT
Start: 2025-06-18 | End: 2025-09-16

## 2025-06-18 ASSESSMENT — DUKE ACTIVITY SCORE INDEX (DASI)
CAN YOU HAVE SEXUAL RELATIONS: NO
TOTAL_SCORE: 26.95
CAN YOU DO MODERATE WORK AROUND THE HOUSE LIKE VACUUMING, SWEEPING FLOORS OR CARRYING GROCERIES: YES
DASI METS SCORE: 6.1
CAN YOU RUN A SHORT DISTANCE: NO
CAN YOU DO LIGHT WORK AROUND THE HOUSE LIKE DUSTING OR WASHING DISHES: YES
CAN YOU CLIMB A FLIGHT OF STAIRS OR WALK UP A HILL: YES
CAN YOU DO YARD WORK LIKE RAKING LEAVES, WEEDING OR PUSHING A MOWER: NO
CAN YOU PARTICIPATE IN STRENOUS SPORTS LIKE SWIMMING, SINGLES TENNIS, FOOTBALL, BASKETBALL, OR SKIING: NO
CAN YOU WALK A BLOCK OR TWO ON LEVEL GROUND: YES
CAN YOU WALK INDOORS, SUCH AS AROUND YOUR HOUSE: YES
CAN YOU PARTICIPATE IN MODERATE RECREATIONAL ACTIVITIES LIKE GOLF, BOWLING, DANCING, DOUBLES TENNIS OR THROWING A BASEBALL OR FOOTBALL: NO
CAN YOU DO HEAVY WORK AROUND THE HOUSE LIKE SCRUBBING FLOORS OR LIFTING AND MOVING HEAVY FURNITURE: YES
CAN YOU TAKE CARE OF YOURSELF (EAT, DRESS, BATHE, OR USE TOILET): YES

## 2025-06-18 ASSESSMENT — PATIENT HEALTH QUESTIONNAIRE - PHQ9
6. FEELING BAD ABOUT YOURSELF - OR THAT YOU ARE A FAILURE OR HAVE LET YOURSELF OR YOUR FAMILY DOWN: NOT AT ALL
1. LITTLE INTEREST OR PLEASURE IN DOING THINGS: NOT AT ALL
7. TROUBLE CONCENTRATING ON THINGS, SUCH AS READING THE NEWSPAPER OR WATCHING TELEVISION: NOT AT ALL
3. TROUBLE FALLING OR STAYING ASLEEP OR SLEEPING TOO MUCH: NOT AT ALL
2. FEELING DOWN, DEPRESSED OR HOPELESS: NOT AT ALL
5. POOR APPETITE OR OVEREATING: NOT AT ALL
SUM OF ALL RESPONSES TO PHQ QUESTIONS 1-9: 0
8. MOVING OR SPEAKING SO SLOWLY THAT OTHER PEOPLE COULD HAVE NOTICED. OR THE OPPOSITE, BEING SO FIGETY OR RESTLESS THAT YOU HAVE BEEN MOVING AROUND A LOT MORE THAN USUAL: NOT AT ALL
9. THOUGHTS THAT YOU WOULD BE BETTER OFF DEAD, OR OF HURTING YOURSELF: NOT AT ALL
SUM OF ALL RESPONSES TO PHQ QUESTIONS 1-9: 0
4. FEELING TIRED OR HAVING LITTLE ENERGY: NOT AT ALL
SUM OF ALL RESPONSES TO PHQ9 QUESTIONS 1 & 2: 0

## 2025-06-18 ASSESSMENT — PAIN SCALES - GENERAL: PAINLEVEL_OUTOF10: 0-NO PAIN

## 2025-06-18 NOTE — PROGRESS NOTES
"  Cardiac Rehabilitation Initial Treatment Plan    Name: Nevaeh Fair  Medical Record Number: [id number]  YOB: 1945  Age: 79 y.o.    Today’s Date: 6/18/2025  Primary Care Physician: Beba Montgomery DO  Referring Physician: Aditya Trejo,*  Program Location: [address]     General  Primary Diagnosis:   1. Status post coronary artery bypass graft  Referral to Cardiac Rehab         Onset/Date of Diagnosis: 9/9/2024    Initial Assessment, not yet started program.    AACVPR Risk Stratification: Moderate     Falls Risk: Low  Psychosocial Assessment     Pre PHQ-9: 0      Sent PH-Q 9 to MD if score > 20: No; score < 20    Pt reported/currently experiencing stress: No  Patient uses stress management skills: No   History of: anxiety and situational depression  Currently seeing a mental health provider: No  Social Support: Yes, Whom:family  Quality of Life Survey: SF-36   SF-36 Pre Post   Physical Component Score     Mental Component Score       Learning Assessment:  Learning assessment/barriers: None  Preferred learning method: Reading handout  Barriers: None  Comments:    Stages of Change:Preparation    Psychosocial Plan    Goal Status: Initial Assessment; goals not yet started    Psychosocial Goals: Maintain or lower PH-Q 9 score by discharge    Psychosocial Interventions/Education: To be done in Cardiac Rehab.    Initial Assessment: not yet started    Nutrition Assessment:    Hyperlipidemia: Yes     Lipids:   Lab Results   Component Value Date    CHOL 153 09/30/2022    HDL 49.4 09/30/2022    LDLF 67 09/30/2022    TRIG 185 (H) 09/30/2022       Current Dietary Guidelines: Low fat, Low sodium  Barriers to dietary change: no    Diet Habit Survey: Picture Your Plate  Pre: 54  Post: To be done at discharge.    Diabetes Assessment    Lab Results   Component Value Date    HGBA1C 5.6 01/13/2025       History of Diabetes: No    Weight Management    Height: 165.1 cm (5' 5\")  Weight: 71.6 kg (157 lb " 12.8 oz)  BMI (Calculated): 26.26      Nutrition Plan    Goal Status: Initial Assessment; goals not yet started    Nutrition Goals: Lipid Goal: HDL>45, LDL <70, Total <180, Trigs <150, Improve Diet Habit Survey score by 5-10 points by discharge, Adapt a Mediterranean focused diet prior to discharge, and Learn how to read and interpret nutrition labels prior to discharge    Nutrition Interventions/Education:   To be done in Cardiac Rehab.    Initial Assessment: not yet started    Exercise Assessment    Home Exercise: No  Mode: NA  Frequency: NA  Duration: NA    Exercise Prescription     Exercise Prescription based on: Duke Activity Status Index (DASI)    DASI Score: 26.95   MET Score: 6.1   Frequency:  3 days/week   Mode: Treadmill, NuStep, and Recumbent Cycle   Duration: 21 total aerobic minutes   Intensity: RPE 12-16  Target HR:  To be calculated after 6 attended sessions.  MET Level: 2.8  Patient wears supplemental O2: No     Modality Workload METs Duration (minutes)   1 Pre-Exercise      2 Treadmill 2 mph @ 1%  2.8 7 :00   3 NuStep 46 Wood @ Load 3  2.4 7 :00   4 Recumbent Bike Load 3 @ 55 rpm  2.8 7 :00   5 Cooldown    5 :00   6 Post-Exercise        Resistance Training: No   Home Exercise Prescription given: To be given prior to discharge from program.    Exercise Plan    Goal Status: Initial Assessment; goals not yet started    Exercise Goals: Increase total exercise duration to 30-45 minutes, Obtain 150 minutes/week of moderate intensity aerobic exercise, and Establish a home exercise program before discharge    Exercise Interventions/Education:   To be done in Cardiac Rehab.    Initial Assessment: not yet started    Other Core Components/Risk Factor Assessment:    Medication adherence  Current Medications:   Medication Documentation Review Audit       Reviewed by Molly Oh RN (Registered Nurse) on 06/18/25 at 1431      Medication Order Taking? Sig Documenting Provider Last Dose Status   apixaban  (Eliquis) 5 mg tablet [id number] Yes Take 1 tablet (5 mg) by mouth 2 times a day. Beba Montgomery DO  Active   clopidogrel (Plavix) 75 mg tablet [id number] Yes Take 1 tablet (75 mg) by mouth once daily. MOIRA Gary  Active     Discontinued 06/18/25 0933   dicyclomine (Bentyl) 10 mg capsule [id number] Yes TAKE 1 CAPSULE (10 MG) BY MOUTH 4 TIMES A DAY.   Patient taking differently: Take 1 capsule (10 mg) by mouth once daily.    Beba Montgomery DO  Active   estradiol (Estrace) 0.01 % (0.1 mg/gram) vaginal cream [id number]  Apply 0.5g (pea size amount) nightly to vaginal entrance for 2 weeks, then 2-3 times per week Josselin Clay MD  Active   hydrocortisone 2.5 % cream [id number]  Apply topically 2 times a day as needed for irritation or rash.   Patient not taking: Reported on 6/18/2025    Beba Montgomery DO  Active   ipratropium (Atrovent) 21 mcg (0.03 %) nasal spray [id number] Yes ADMINISTER 2 SPRAYS INTO EACH NOSTRIL EVERY 12 HOURS. Beba Montgomery DO  Active   lidocaine (Lidoderm) 5 % patch [id number] Yes SELECT TO VIEW EXTENDED SIG Beba Montgomery DO  Active   metoprolol tartrate (Lopressor) 25 mg tablet [id number] Yes Take 1 tablet (25 mg) by mouth 2 times a day.   Patient taking differently: Take 2 tablets (50 mg) by mouth 2 times a day.    MOIRA Gary  Active   nitroglycerin (Nitrostat) 0.4 mg SL tablet [id number] Yes  Historical Provider, MD  Active   omeprazole (PriLOSEC) 40 mg DR capsule [id number] Yes Take 1 capsule (40 mg) by mouth once daily. MOIRA Tao  Active   QUEtiapine (SEROquel) 25 mg tablet [id number] Yes TAKE 3 TABLETS (75 MG) BY MOUTH ONCE DAILY AT BEDTIME. Beba Montgomery DO  Active   rosuvastatin (Crestor) 40 mg tablet [id number] Yes TAKE 1 TABLET BY MOUTH EVERYDAY AT BEDTIME Beba Montgomery DO  Active   tiZANidine (Zanaflex) 2 mg tablet [id number] Yes TAKE 1 TABLET (2 MG) BY MOUTH ONCE DAILY AS NEEDED FOR MUSCLE SPASMS. Beba Montgomery, DO  Active                                  Medication compliance: Yes   Uses pill box/organizer: Yes    Carries medication list: Yes MyChart    Blood Pressure Management  History of Hypertension: Yes   Medication Changes: Yes   Resting BP:  Visit Vitals  /58 (BP Location: Left arm, Patient Position: Sitting, BP Cuff Size: Small adult)   Pulse 61        Heart Failure Management  Hx of Heart Failure: No    Smoking/Tobacco Assessment  Tobacco Use History[1]    Other Core Component Plan    Goal Status: Initial Assessment; goals not yet started    Other Core Component Goals: Medication compliance, Verbalize medication usage and drug actions by discharge, Verbalize SL NTG action and proper dosage by discharge, and Achieve resting BP of < 130/80 by discharge    Other Core Component Interventions/Education:   To be done in Cardiac Rehab    Initial Assessment: not yet started    Individual Patient Goals:    Lose 15 lbs by discharge.  Learn how to eat healthy by discharge.  Establish a regular home exercise routine by discharge.    Goal Status: Initial Assessment; goals not yet started    Staff Comments:  Can attend 3 days a week MWF at 8:00.  Pt was advised to call her insurance.  Info on insurance verification given to pt.    Rehab Staff Signature: Molly Oh RN             [1]   Social History  Tobacco Use   Smoking Status Former    Types: Cigarettes   Smokeless Tobacco Never

## 2025-06-19 ENCOUNTER — TELEPHONE (OUTPATIENT)
Dept: CARDIAC REHAB | Facility: CLINIC | Age: 80
End: 2025-06-19
Payer: MEDICARE

## 2025-06-19 ENCOUNTER — TELEPHONE (OUTPATIENT)
Dept: PRIMARY CARE | Facility: CLINIC | Age: 80
End: 2025-06-19
Payer: MEDICARE

## 2025-06-19 NOTE — TELEPHONE ENCOUNTER
Neaveh Fair  06/19/25    Called patient to schedule start date for the rehab sessions for cardiac rehab at [facility]. Left a voicemail to call our office to get scheduled upon earliest convenience at [phone].    Molly Oh RN

## 2025-06-20 DIAGNOSIS — N39.0 RECURRENT UTI: ICD-10-CM

## 2025-06-20 DIAGNOSIS — Z95.1 S/P CABG X 3: Primary | ICD-10-CM

## 2025-06-20 DIAGNOSIS — G47.00 INSOMNIA, UNSPECIFIED TYPE: ICD-10-CM

## 2025-06-23 DIAGNOSIS — F41.9 ANXIETY: Primary | ICD-10-CM

## 2025-06-23 RX ORDER — QUETIAPINE FUMARATE 25 MG/1
75 TABLET, FILM COATED ORAL NIGHTLY
Qty: 270 TABLET | Refills: 1 | Status: SHIPPED | OUTPATIENT
Start: 2025-06-23 | End: 2025-12-20

## 2025-06-23 RX ORDER — VENLAFAXINE HYDROCHLORIDE 37.5 MG/1
37.5 CAPSULE, EXTENDED RELEASE ORAL DAILY
Qty: 30 CAPSULE | Refills: 0 | Status: SHIPPED | OUTPATIENT
Start: 2025-06-23 | End: 2025-08-22

## 2025-06-23 NOTE — PROGRESS NOTES
Pt called to inform she has restarted her lexapro due to increased symptoms. I will send in 30 days to get pt back into the office.

## 2025-06-24 ENCOUNTER — TELEPHONE (OUTPATIENT)
Dept: PRIMARY CARE | Facility: CLINIC | Age: 80
End: 2025-06-24

## 2025-06-24 ENCOUNTER — APPOINTMENT (OUTPATIENT)
Dept: PRIMARY CARE | Facility: CLINIC | Age: 80
End: 2025-06-24
Payer: MEDICARE

## 2025-06-25 ENCOUNTER — CLINICAL SUPPORT (OUTPATIENT)
Dept: CARDIAC REHAB | Facility: CLINIC | Age: 80
End: 2025-06-25
Payer: MEDICARE

## 2025-06-25 DIAGNOSIS — Z95.1 S/P CABG X 3: ICD-10-CM

## 2025-06-25 PROCEDURE — 93798 PHYS/QHP OP CAR RHAB W/ECG: CPT | Performed by: INTERNAL MEDICINE

## 2025-06-25 NOTE — PROGRESS NOTES
Cardiac Rehab Education  Nevaeh Fair   [id number]    6/25/2025  Effects of stress on the body, risks of unmanaged stress and techniques to minimize anxieties were all discussed in today's session. Handout was given for reference with resources to  Behavioral Health Services should patient need further assistance with stress management. Patient encouraged to notify staff if stressors increase and additional help is required.      Signature Karen Durán RN

## 2025-06-27 ENCOUNTER — APPOINTMENT (OUTPATIENT)
Dept: PRIMARY CARE | Facility: CLINIC | Age: 80
End: 2025-06-27
Payer: MEDICARE

## 2025-06-27 DIAGNOSIS — N39.0 RECURRENT UTI: ICD-10-CM

## 2025-07-02 ENCOUNTER — APPOINTMENT (OUTPATIENT)
Dept: PRIMARY CARE | Facility: CLINIC | Age: 80
End: 2025-07-02
Payer: MEDICARE

## 2025-07-02 DIAGNOSIS — Z95.1 S/P CABG (CORONARY ARTERY BYPASS GRAFT): ICD-10-CM

## 2025-07-02 DIAGNOSIS — M54.50 CHRONIC LOW BACK PAIN WITHOUT SCIATICA, UNSPECIFIED BACK PAIN LATERALITY: ICD-10-CM

## 2025-07-02 DIAGNOSIS — G89.29 CHRONIC LOW BACK PAIN WITHOUT SCIATICA, UNSPECIFIED BACK PAIN LATERALITY: ICD-10-CM

## 2025-07-02 DIAGNOSIS — J34.89 RHINORRHEA: ICD-10-CM

## 2025-07-02 RX ORDER — TIZANIDINE 2 MG/1
2 TABLET ORAL DAILY PRN
Qty: 30 TABLET | Refills: 0 | Status: SHIPPED | OUTPATIENT
Start: 2025-07-02 | End: 2025-10-30

## 2025-07-02 RX ORDER — CLOPIDOGREL BISULFATE 75 MG/1
75 TABLET ORAL DAILY
Qty: 30 TABLET | Refills: 0 | Status: SHIPPED | OUTPATIENT
Start: 2025-07-02

## 2025-07-02 RX ORDER — IPRATROPIUM BROMIDE 21 UG/1
2 SPRAY, METERED NASAL EVERY 12 HOURS
Qty: 30 ML | Refills: 0 | Status: SHIPPED | OUTPATIENT
Start: 2025-07-02 | End: 2026-07-02

## 2025-07-04 DIAGNOSIS — N39.0 RECURRENT UTI: ICD-10-CM

## 2025-07-07 ENCOUNTER — CLINICAL SUPPORT (OUTPATIENT)
Dept: CARDIAC REHAB | Facility: CLINIC | Age: 80
End: 2025-07-07
Payer: MEDICARE

## 2025-07-07 DIAGNOSIS — Z95.1 S/P CABG X 3: ICD-10-CM

## 2025-07-09 ENCOUNTER — APPOINTMENT (OUTPATIENT)
Dept: CARDIAC REHAB | Facility: CLINIC | Age: 80
End: 2025-07-09
Payer: MEDICARE

## 2025-07-11 ENCOUNTER — APPOINTMENT (OUTPATIENT)
Dept: CARDIAC REHAB | Facility: CLINIC | Age: 80
End: 2025-07-11
Payer: MEDICARE

## 2025-07-11 ENCOUNTER — APPOINTMENT (OUTPATIENT)
Dept: SURGICAL ONCOLOGY | Facility: CLINIC | Age: 80
End: 2025-07-11
Payer: MEDICARE

## 2025-07-11 DIAGNOSIS — N39.0 RECURRENT UTI: ICD-10-CM

## 2025-07-14 ENCOUNTER — APPOINTMENT (OUTPATIENT)
Dept: CARDIAC REHAB | Facility: CLINIC | Age: 80
End: 2025-07-14
Payer: MEDICARE

## 2025-07-15 DIAGNOSIS — F41.9 ANXIETY: ICD-10-CM

## 2025-07-15 RX ORDER — VENLAFAXINE HYDROCHLORIDE 37.5 MG/1
37.5 CAPSULE, EXTENDED RELEASE ORAL DAILY
Qty: 30 CAPSULE | Refills: 0 | Status: SHIPPED | OUTPATIENT
Start: 2025-07-15 | End: 2025-08-14

## 2025-07-16 ENCOUNTER — APPOINTMENT (OUTPATIENT)
Dept: CARDIAC REHAB | Facility: CLINIC | Age: 80
End: 2025-07-16
Payer: MEDICARE

## 2025-07-18 ENCOUNTER — APPOINTMENT (OUTPATIENT)
Dept: CARDIAC REHAB | Facility: CLINIC | Age: 80
End: 2025-07-18
Payer: MEDICARE

## 2025-07-18 DIAGNOSIS — N39.0 RECURRENT UTI: ICD-10-CM

## 2025-07-21 ENCOUNTER — APPOINTMENT (OUTPATIENT)
Dept: CARDIAC REHAB | Facility: CLINIC | Age: 80
End: 2025-07-21
Payer: MEDICARE

## 2025-07-23 ENCOUNTER — APPOINTMENT (OUTPATIENT)
Dept: CARDIAC REHAB | Facility: CLINIC | Age: 80
End: 2025-07-23
Payer: MEDICARE

## 2025-07-25 ENCOUNTER — APPOINTMENT (OUTPATIENT)
Dept: CARDIAC REHAB | Facility: CLINIC | Age: 80
End: 2025-07-25
Payer: MEDICARE

## 2025-07-25 DIAGNOSIS — N39.0 RECURRENT UTI: ICD-10-CM

## 2025-07-28 ENCOUNTER — APPOINTMENT (OUTPATIENT)
Dept: CARDIAC REHAB | Facility: CLINIC | Age: 80
End: 2025-07-28
Payer: MEDICARE

## 2025-07-30 ENCOUNTER — APPOINTMENT (OUTPATIENT)
Dept: CARDIAC REHAB | Facility: CLINIC | Age: 80
End: 2025-07-30
Payer: MEDICARE

## 2025-08-01 DIAGNOSIS — N39.0 RECURRENT UTI: ICD-10-CM

## 2025-08-07 ENCOUNTER — TELEPHONE (OUTPATIENT)
Dept: CARDIOLOGY | Facility: CLINIC | Age: 80
End: 2025-08-07

## 2025-08-07 ENCOUNTER — APPOINTMENT (OUTPATIENT)
Dept: PRIMARY CARE | Facility: CLINIC | Age: 80
End: 2025-08-07
Payer: MEDICARE

## 2025-08-07 ENCOUNTER — OFFICE VISIT (OUTPATIENT)
Dept: CARDIOLOGY | Facility: CLINIC | Age: 80
End: 2025-08-07
Payer: MEDICARE

## 2025-08-07 VITALS
BODY MASS INDEX: 25.33 KG/M2 | HEART RATE: 74 BPM | WEIGHT: 152 LBS | DIASTOLIC BLOOD PRESSURE: 78 MMHG | OXYGEN SATURATION: 97 % | HEIGHT: 65 IN | SYSTOLIC BLOOD PRESSURE: 142 MMHG

## 2025-08-07 VITALS
OXYGEN SATURATION: 98 % | WEIGHT: 149 LBS | BODY MASS INDEX: 24.79 KG/M2 | DIASTOLIC BLOOD PRESSURE: 64 MMHG | HEART RATE: 68 BPM | SYSTOLIC BLOOD PRESSURE: 96 MMHG

## 2025-08-07 DIAGNOSIS — H35.30 MACULAR DEGENERATION, UNSPECIFIED LATERALITY, UNSPECIFIED TYPE: ICD-10-CM

## 2025-08-07 DIAGNOSIS — Z95.1 HX OF CABG: ICD-10-CM

## 2025-08-07 DIAGNOSIS — E11.69 TYPE 2 DIABETES MELLITUS WITH OTHER SPECIFIED COMPLICATION, WITHOUT LONG-TERM CURRENT USE OF INSULIN: ICD-10-CM

## 2025-08-07 DIAGNOSIS — I10 PRIMARY HYPERTENSION: ICD-10-CM

## 2025-08-07 DIAGNOSIS — K86.89 PANCREATIC DUCT DILATED (HHS-HCC): ICD-10-CM

## 2025-08-07 DIAGNOSIS — Z76.89 ENCOUNTER TO ESTABLISH CARE: Primary | ICD-10-CM

## 2025-08-07 DIAGNOSIS — G89.29 CHRONIC LOW BACK PAIN WITHOUT SCIATICA, UNSPECIFIED BACK PAIN LATERALITY: ICD-10-CM

## 2025-08-07 DIAGNOSIS — Z13.220 NEED FOR LIPID SCREENING: ICD-10-CM

## 2025-08-07 DIAGNOSIS — F41.9 ANXIETY: ICD-10-CM

## 2025-08-07 DIAGNOSIS — M54.50 CHRONIC LOW BACK PAIN WITHOUT SCIATICA, UNSPECIFIED BACK PAIN LATERALITY: ICD-10-CM

## 2025-08-07 DIAGNOSIS — I26.99 ACUTE PULMONARY EMBOLISM, UNSPECIFIED PULMONARY EMBOLISM TYPE, UNSPECIFIED WHETHER ACUTE COR PULMONALE PRESENT (MULTI): Primary | ICD-10-CM

## 2025-08-07 DIAGNOSIS — Z95.1 S/P CABG (CORONARY ARTERY BYPASS GRAFT): ICD-10-CM

## 2025-08-07 PROCEDURE — G2211 COMPLEX E/M VISIT ADD ON: HCPCS

## 2025-08-07 PROCEDURE — 3077F SYST BP >= 140 MM HG: CPT

## 2025-08-07 PROCEDURE — 1160F RVW MEDS BY RX/DR IN RCRD: CPT | Performed by: INTERNAL MEDICINE

## 2025-08-07 PROCEDURE — 99212 OFFICE O/P EST SF 10 MIN: CPT

## 2025-08-07 PROCEDURE — 3078F DIAST BP <80 MM HG: CPT | Performed by: INTERNAL MEDICINE

## 2025-08-07 PROCEDURE — 99214 OFFICE O/P EST MOD 30 MIN: CPT | Performed by: INTERNAL MEDICINE

## 2025-08-07 PROCEDURE — 1159F MED LIST DOCD IN RCRD: CPT | Performed by: INTERNAL MEDICINE

## 2025-08-07 PROCEDURE — 99214 OFFICE O/P EST MOD 30 MIN: CPT

## 2025-08-07 PROCEDURE — 1126F AMNT PAIN NOTED NONE PRSNT: CPT

## 2025-08-07 PROCEDURE — 3074F SYST BP LT 130 MM HG: CPT | Performed by: INTERNAL MEDICINE

## 2025-08-07 PROCEDURE — G2211 COMPLEX E/M VISIT ADD ON: HCPCS | Performed by: INTERNAL MEDICINE

## 2025-08-07 PROCEDURE — 1159F MED LIST DOCD IN RCRD: CPT

## 2025-08-07 PROCEDURE — 3078F DIAST BP <80 MM HG: CPT

## 2025-08-07 ASSESSMENT — ENCOUNTER SYMPTOMS
POLYDIPSIA: 0
CONFUSION: 0
JOINT SWELLING: 0
HEADACHES: 0
FATIGUE: 0
NUMBNESS: 0
WEAKNESS: 0
OCCASIONAL FEELINGS OF UNSTEADINESS: 0
SEIZURES: 0
UNEXPECTED WEIGHT CHANGE: 0
LIGHT-HEADEDNESS: 0
BACK PAIN: 1
CHEST TIGHTNESS: 0
BRUISES/BLEEDS EASILY: 0
LOSS OF SENSATION IN FEET: 0
CHILLS: 0
DIZZINESS: 0
DEPRESSION: 0
NECK PAIN: 0

## 2025-08-07 ASSESSMENT — PAIN SCALES - GENERAL: PAINLEVEL_OUTOF10: 0-NO PAIN

## 2025-08-07 NOTE — PROGRESS NOTES
Subjective   Patient ID: Nevaeh Fair is a 79 y.o. female who presents for Establish Care (NPV).  HPI      - kenyon    previous PCP - dr. Montgomery      , lives with  retired- worked in customer service,  was an - 3 kids- 2 sons in [place]- daughter is here- close by - 9 grandkids- and 6 grreatgradnkids- babysits one day a week - works at [employer]- few days a week   Specialists-vascular- tanya,  will be seeing cardiology- dr abraham murillo-   UTD dental and vision UTD    PMhx significant medical hx   -gets funny gfeeling in her throat- not related to exertion- randomly- has taken {nitroglycerin or nitroprusside?:2000005} 4x in the last month- {nitroglycerin or nitroprusside?:2000005} relieves it -   -does have hx of utio- seen by urology- cranberry pills and cranberry d- has been taking these daily- last uti months ago-     -is on ozempic- was on it for a year- stopped and started back on it 2 months ago-     -chronic low back pain without sciatica- arthritis in back- wsa getting cortisone shots- friend in [place] with RA- was seeing ortho- had shot in hip left hip- sports medicine gave her a steroid shot in left hip- hydroxychloroquine from a friend tried it- left hip and low back pain- left sided pain- no shooting pain- hasn't had injetions in a while- aching pain- walking makes it worse- hips - rest helps - uses lidocaine patches every weekend, zanaflex when sge feeks kuike nerve is being pinched- maybe once a month- relief wioth heating pads- dr heard was on hydrocodone didn't really provide relief- was going to have suregryt didn't want to be under anerthesia per doctor - go in and deaden the nervces -     -hx of pe December of 2024-     -on plavis after open hgeart surgert , then with blood clots was put on eliquis- 2.5 BID- eliquids was decreased- no bleeding or bruising-     -hx diarrhea- bentyl- takes this twice a day   -hx seasonal allergies- atrovent as needed - metoprolol was  making her nose run? She stopped taking her metropolol but she is taking metoprolol 50 mg once daily- stopped taking this for a couple weeks - bp never went up per pt - started taking it again a year ago-     -HTN- metoprolol 50 mg- checks bp at home- usually 138/84 yesterday - last week 130/70s - limit salt intake - no chest pain, etc    -macular degernetation- shots in eye- stiooed then in right eye- right eye doesn't see as good- followed by ophthalmology     -anxiety/insomnia- was previously on restoril - went to psychiatry and would not give it to her- states she has been confused as pout on seroquel for this per psychioatry- seroquel 75mg- tried 25, 50 , was not enough now 75 is good- feels stable on this dose- was taking seroquel and xanax and last doctor would not do this - trazodone is horrible feels horrible the next day feels hungover when she wakes up- sleeps well with seroquel- didn't like ambien -    on ozempic 0.5 mg once a week -does not check blood sugars-     -psychiatrist put her on effexor years ago- states she was not depressed I nthe past stopped taking effexor doesn't nikita kshe needs-   -did not do cardiac rehab   -Pancreatic duct dilation Mrcp was recommend -saw GI - patient states doctor was more concerned she see someone went to pancreas doctor - the reading was wrong per pt- doctor did not feel like it needsed anymore imaging- 6 or 8 months ago- saw GI in [place] - no pain-   PShx: open heart surgery 2024- CABG  Social hx: etoh- none, no tobacco use, no illicit drug use   Tries to watch  diet and exercise- works and babysit   immunizations - does not want any vaccines- educated doesn't want   Screenings-  -colonoscopy-last one in 2013- defer d/t age  -mammogram- educated - she does not wnt refuses- aceepts risk   -dexa- refuses   FMhx: mother- diabetes, CAD, passed at 89 yo from CAD, father - passed at 68 yo from MI- emphysema siblings - sister with CAD  Past medical, surgical, social, and  "family history all reviewed     patient states feeling well, no complaints at this time   denies N/V, headache, dizziness, CP, SOB, palpitations, edema, numbness, tingling, weakness    Review of Systems   Constitutional:  Negative for chills, fatigue and unexpected weight change.   HENT:  Negative for congestion.    Respiratory:  Negative for chest tightness.    Endocrine: Negative for cold intolerance, polydipsia and polyuria.   Musculoskeletal:  Positive for back pain. Negative for joint swelling and neck pain.        Left hip     Skin:  Negative for rash.   Neurological:  Negative for dizziness, seizures, syncope, weakness, light-headedness, numbness and headaches.   Hematological:  Does not bruise/bleed easily.   Psychiatric/Behavioral:  Negative for confusion.         Hx anxiety       Objective   /78 (BP Location: Left arm, Patient Position: Sitting, BP Cuff Size: Large adult)   Pulse 74   Ht 1.651 m (5' 5\")   Wt 68.9 kg (152 lb)   SpO2 97%   BMI 25.29 kg/m²     Physical Exam    Assessment & Plan  Encounter to establish care  -pt to schedule BP follow up in one month   -pt should not be taking plavix and eliquis pt states she was told to do this after surgery- discussed increased risk        Chronic low back pain without sciatica, unspecified back pain laterality    Orders:    Referral to Pain Medicine; Future    Primary hypertension    Orders:    Comprehensive Metabolic Panel; Future    CBC; Future    Pancreatic duct dilated ([facility]HS-HCC)    Orders:    Referral to Gastroenterology; Future    Need for lipid screening    Orders:    Lipid panel; Future    Type 2 diabetes mellitus with other specified complication, without long-term current use of insulin    Orders:    Hemoglobin A1c; Future              Valarie Barton PA-C 08/08/25 7:55 AM   " person, place, and time. Mental status is at baseline.     Psychiatric:         Mood and Affect: Mood normal.         Behavior: Behavior normal.         Thought Content: Thought content normal.         Judgment: Judgment normal.         Assessment & Plan  Encounter to establish care  -pt to schedule BP follow up in one month   -pt educated that she should not be taking plavix and eliquis- pt states she was told to do this after surgery- discussed increased risk of bleeding, risks of taking both of these- pt aware- advised against this  -pt states she will follow up with her cardiologist about this        Chronic low back pain without sciatica, unspecified back pain laterality  -patient was advised to not take hydroxychloroquine and medicines that she is not prescribed- discussed side effects of this medication- pt aware- she will not take this  -can take tylenol for pain as needed  -continue with heating pads  -No NSAIDs given that she is on anticoagulation- discussed risk- pt aware   -referral to pain medicine   Orders:    Referral to Pain Medicine; Future    Primary hypertension    -stable  -fair control  -Continue current medications, side effects, risks and options discussed, patient aware   -Encouraged dietary sodium restriction/ DASH diet  -Recommend regular aerobic exercise  -Discussed need and benefit for weight loss  -Recheck in 4-6 months or sooner should any symptoms or problems arise  - Goal of blood pressure less than 130/80    Orders:    Comprehensive Metabolic Panel; Future    CBC; Future    Pancreatic duct dilated ([facility]HS-HCC)  -pt was previously evaluated by surgical oncology- who recommended repeat MRCP   -referral to GI to further discuss this     Orders:    Referral to Gastroenterology; Future    Need for lipid screening    Orders:    Lipid panel; Future    Type 2 diabetes mellitus with other specified complication, without long-term current use of insulin  -pt is on ozempic- side effects, risks  discussed- pt aware  -ophthalmology for evaluation and management of diabetic eye changes  -encouraged regular aerobic exercise, weight loss and diabetic diet ADA  -discussed diabetic education issues of long term diabetic complications, hyperglycemic symptoms, diet and importance of exercise  -follow up in 3-4 months, sooner if issues arise    Orders:    Hemoglobin A1c; Future    Hx of CABG  -eliquis 2.5 mg BID         Macular degeneration, unspecified laterality, unspecified type  -management per ophthalmology        Anxiety  -seroquel 75 mg- stable        complexity visit of 60+  minutes face-to-face with at least half of the time discussing  Results of my examination, clinical findings, impression and diagnoses discussed with the patient as well as results of the latest test/lab work. The patient was in agreement with the plan and verbalized a good understanding of instructions and plans for treatment. At the end of the visit today, the patient felt that all questions had been answered satisfactorily. The patient was pleased with the visit and very appreciative for the care rendered.     -Go to ER with any new or worsening pain, chest pain, SOB, diaphoresis, numbness, tingling, blurry vision, headache.          Valarie Barton PA-C 08/20/25 7:34 PM

## 2025-08-07 NOTE — PROGRESS NOTES
Vascular Medicine established visit    History of Present Illness:  Nevaeh Fair is a/an 79 y.o. woman with history of CABG 8/2024, subarachnoid hemorrhage 2011 admitted at [facility] in late December 2024 with distal right main pulmonary embolism with no RV strain. She was treated with anticoagulation alone.     She had traveled to [place] in November and December 2024.     Had a head cold in September but had persistent cough after that.    She was last seen on 4/10/2025.    She continues on apixaban 5 mg bid.    She denies bleeding including epistaxis, gingival bleeding, hemoptysis, hematemesis, hematochezia, melena, hematuria, and vaginal bleeding.    Had dilation of pancreatic duct that is felt most likely to be age-related and she will follow with repeat MRI in a year.    Gets a funny feeling in her throat intermittently that is not necessarily related to exertion (occurs randomly). No associated shortness of breath or diaphoresis. She continues to work as a ; she is active in that regard and hangs out with her grandkids. Took nitroglycerin four times in the last month and found it relieved her symptoms.    On semaglutide. Has GERD. Notes also she has been having to take her PPI twice a day at times.    Did not do cardiac rehab.       Medical History[1]  Surgical History[2]  Social History[3]  Family History[4]  Current Medications[5]    Physical Examination:  Blood pressure 96/64, pulse 68, weight 67.6 kg (149 lb), SpO2 98%.  No distress  No JVD or carotid bruits  Lungs clear bilaterally  Heart regular and without murmurs  Abdomen soft and non-tender  No leg swelling  Pulses intact      Pertinent Labs:    Pertinent Imaging:  CTA chest 12/25/2024  CHEST:  1.  Pulmonary embolus involving the right distal main pulmonary  artery extending into the right lower lobe and right upper lobar  branches as above. Associated borderline right heart strain pattern.  2. Cardiomegaly.  3. Extensive asymmetric  reticular infiltrates involving the bilateral  right greater left lower lobes, right middle lobe, and right upper  lobe. Findings may be seen with sequela of aspiration or a  nonspecific pneumonitis.      ABDOMEN-PELVIS:  1.  No acute subdiaphragmatic abnormality.  2. Abnormal dilatation of the main pancreatic duct up to 10 mm.  Follow-up nonemergent pancreatic mass protocol MRI or endoscopic  ultrasound is advised for further assessment.  3. Colonic diverticulosis without evidence of acute diverticulitis.  4. Extensive atherosclerotic changes noted about the aortoiliac axis  with aneurysmal dilatation of the right greater left internal iliac  arteries as above.    Diagnoses and all orders for this visit:  Acute pulmonary embolism, unspecified pulmonary embolism type, unspecified whether acute cor pulmonale present (Multi) (Primary)  S/P CABG (coronary artery bypass graft)  -     Referral to Cardiology; Future  Throat/chest symptoms of uncertain etiology; may be related to GERD on GLP-1RA but will get her in with cardiology for further eval  Advised if any severe symptoms to dial 911  Continue apixaban; will reduce the dose to 2.5 mg bid    Follow up in 6 months.    Violet Braxton MD, MS           [1]   Past Medical History:  Diagnosis Date    Abdominal cramping     Anxiety     Coronary artery disease     Familial hypercholesterolemia 04/17/2014    Essential familial hypercholesterolemia    Hypertension     Presence of intraocular lens 07/10/2014    Pseudophakia, left eye   [2]   Past Surgical History:  Procedure Laterality Date    CARDIAC CATHETERIZATION N/A 07/10/2024    Procedure: Left Heart Cath with Coronary Angiography and LV;  Surgeon: Aditya Trejo MD;  Location: [facility] Cardiac Cath Lab;  Service: Cardiovascular;  Laterality: N/A;  7/10/24; 930 am for [facility] [address], CAD with angina I25.119 and hx stent Z98.61  [facility] medicare:  auth # [id number]--qvozs 7/4/24-08/18/24    CATARACT EXTRACTION      COLONOSCOPY       ESOPHAGOGASTRODUODENOSCOPY     [3]   Social History  Tobacco Use    Smoking status: Former     Types: Cigarettes    Smokeless tobacco: Never   Vaping Use    Vaping status: Never Used   Substance Use Topics    Alcohol use: Never    Drug use: Never   [4]   Family History  Problem Relation Name Age of Onset    Heart disease Mother      Coronary artery disease Mother      Heart disease Father      Heart attack Father      Hypertension Sister     [5]   Current Outpatient Medications   Medication Sig Dispense Refill    apixaban (Eliquis) 5 mg tablet Take 1 tablet (5 mg) by mouth 2 times a day. 180 tablet 1    clopidogrel (Plavix) 75 mg tablet TAKE 1 TABLET BY MOUTH ONCE DAILY. 30 tablet 0    dicyclomine (Bentyl) 10 mg capsule TAKE 1 CAPSULE (10 MG) BY MOUTH 4 TIMES A DAY. (Patient taking differently: Take 1 capsule (10 mg) by mouth once daily.) 360 capsule 0    estradiol (Estrace) 0.01 % (0.1 mg/gram) vaginal cream Apply 0.5g (pea size amount) nightly to vaginal entrance for 2 weeks, then 2-3 times per week 42.5 g 11    ipratropium (Atrovent) 21 mcg (0.03 %) nasal spray ADMINISTER 2 SPRAYS INTO EACH NOSTRIL EVERY 12 HOURS. 30 mL 0    lidocaine (Lidoderm) 5 % patch SELECT TO VIEW EXTENDED SIG 30 patch 1    metoprolol tartrate (Lopressor) 25 mg tablet Take 1 tablet (25 mg) by mouth 2 times a day. (Patient taking differently: Take 2 tablets (50 mg) by mouth 2 times a day.) 60 tablet 0    nitroglycerin (Nitrostat) 0.4 mg SL tablet       omeprazole (PriLOSEC) 40 mg DR capsule Take 1 capsule (40 mg) by mouth once daily. 30 capsule 0    QUEtiapine (SEROquel) 25 mg tablet TAKE 3 TABLETS (75 MG) BY MOUTH ONCE DAILY AT BEDTIME. 270 tablet 1    rosuvastatin (Crestor) 40 mg tablet TAKE 1 TABLET BY MOUTH EVERYDAY AT BEDTIME 90 tablet 0    semaglutide (OZEMPIC SUBQ) Inject under the skin.      tiZANidine (Zanaflex) 2 mg tablet TAKE 1 TABLET (2 MG) BY MOUTH ONCE DAILY AS NEEDED FOR MUSCLE SPASMS. 30 tablet 0    venlafaxine XR  (Effexor-XR) 37.5 mg 24 hr capsule Take 1 capsule (37.5 mg) by mouth once daily. Do not crush or chew. 30 capsule 0    hydrocortisone 2.5 % cream Apply topically 2 times a day as needed for irritation or rash. (Patient not taking: Reported on 8/7/2025) 30 g 2     No current facility-administered medications for this visit.

## 2025-08-08 DIAGNOSIS — E78.2 MIXED HYPERLIPIDEMIA: ICD-10-CM

## 2025-08-08 DIAGNOSIS — N39.0 RECURRENT UTI: ICD-10-CM

## 2025-08-08 LAB
ALBUMIN SERPL-MCNC: 4.5 G/DL (ref 3.6–5.1)
ALP SERPL-CCNC: 116 U/L (ref 37–153)
ALT SERPL-CCNC: 13 U/L (ref 6–29)
ANION GAP SERPL CALCULATED.4IONS-SCNC: 11 MMOL/L (CALC) (ref 7–17)
AST SERPL-CCNC: 18 U/L (ref 10–35)
BILIRUB SERPL-MCNC: 0.4 MG/DL (ref 0.2–1.2)
BUN SERPL-MCNC: 15 MG/DL (ref 7–25)
CALCIUM SERPL-MCNC: 10.3 MG/DL (ref 8.6–10.4)
CHLORIDE SERPL-SCNC: 102 MMOL/L (ref 98–110)
CHOLEST SERPL-MCNC: 355 MG/DL
CHOLEST/HDLC SERPL: 7.2 (CALC)
CO2 SERPL-SCNC: 28 MMOL/L (ref 20–32)
CREAT SERPL-MCNC: 0.93 MG/DL (ref 0.6–0.95)
EGFRCR SERPLBLD CKD-EPI 2021: 62 ML/MIN/1.73M2
ERYTHROCYTE [DISTWIDTH] IN BLOOD BY AUTOMATED COUNT: 13.1 % (ref 11–15)
EST. AVERAGE GLUCOSE BLD GHB EST-MCNC: 126 MG/DL
EST. AVERAGE GLUCOSE BLD GHB EST-SCNC: 7 MMOL/L
GLUCOSE SERPL-MCNC: 94 MG/DL (ref 65–99)
HBA1C MFR BLD: 6 %
HCT VFR BLD AUTO: 42.8 % (ref 35–45)
HDLC SERPL-MCNC: 49 MG/DL
HGB BLD-MCNC: 14.6 G/DL (ref 11.7–15.5)
LDLC SERPL CALC-MCNC: ABNORMAL MG/DL
MCH RBC QN AUTO: 32.5 PG (ref 27–33)
MCHC RBC AUTO-ENTMCNC: 34.1 G/DL (ref 32–36)
MCV RBC AUTO: 95.3 FL (ref 80–100)
NONHDLC SERPL-MCNC: 306 MG/DL (CALC)
PLATELET # BLD AUTO: 205 THOUSAND/UL (ref 140–400)
PMV BLD REES-ECKER: 9.5 FL (ref 7.5–12.5)
POTASSIUM SERPL-SCNC: 4.3 MMOL/L (ref 3.5–5.3)
PROT SERPL-MCNC: 7.5 G/DL (ref 6.1–8.1)
RBC # BLD AUTO: 4.49 MILLION/UL (ref 3.8–5.1)
SODIUM SERPL-SCNC: 141 MMOL/L (ref 135–146)
TRIGL SERPL-MCNC: 430 MG/DL
WBC # BLD AUTO: 6.2 THOUSAND/UL (ref 3.8–10.8)

## 2025-08-08 RX ORDER — ROSUVASTATIN CALCIUM 40 MG/1
40 TABLET, COATED ORAL NIGHTLY
Qty: 90 TABLET | Refills: 0 | Status: SHIPPED | OUTPATIENT
Start: 2025-08-08 | End: 2026-02-04

## 2025-08-13 LAB — BACTERIA UR CULT: ABNORMAL

## 2025-08-14 DIAGNOSIS — N30.00 ACUTE CYSTITIS WITHOUT HEMATURIA: Primary | ICD-10-CM

## 2025-08-14 RX ORDER — NITROFURANTOIN 25; 75 MG/1; MG/1
100 CAPSULE ORAL 2 TIMES DAILY
Qty: 14 CAPSULE | Refills: 0 | Status: SHIPPED | OUTPATIENT
Start: 2025-08-14 | End: 2025-08-21

## 2025-08-15 DIAGNOSIS — N39.0 RECURRENT UTI: ICD-10-CM

## 2025-08-20 ASSESSMENT — ENCOUNTER SYMPTOMS
FLANK PAIN: 0
HEMATURIA: 0
SINUS PAIN: 0
PALPITATIONS: 0
VOMITING: 0
DIFFICULTY URINATING: 0
CONSTIPATION: 0
TREMORS: 0
DIARRHEA: 0
NAUSEA: 0
WHEEZING: 0
FREQUENCY: 0
SINUS PRESSURE: 0
DYSURIA: 0
BLOOD IN STOOL: 0
SHORTNESS OF BREATH: 0
COUGH: 0
ABDOMINAL PAIN: 0

## 2025-08-20 NOTE — ASSESSMENT & PLAN NOTE
-seroquel 75 mg- stable        complexity visit of 60+  minutes face-to-face with at least half of the time discussing  Results of my examination, clinical findings, impression and diagnoses discussed with the patient as well as results of the latest test/lab work. The patient was in agreement with the plan and verbalized a good understanding of instructions and plans for treatment. At the end of the visit today, the patient felt that all questions had been answered satisfactorily. The patient was pleased with the visit and very appreciative for the care rendered.

## 2025-08-22 ENCOUNTER — OFFICE VISIT (OUTPATIENT)
Dept: CARDIOLOGY | Facility: CLINIC | Age: 80
End: 2025-08-22
Payer: MEDICARE

## 2025-08-22 VITALS
HEART RATE: 54 BPM | OXYGEN SATURATION: 98 % | BODY MASS INDEX: 25.29 KG/M2 | WEIGHT: 152 LBS | DIASTOLIC BLOOD PRESSURE: 81 MMHG | SYSTOLIC BLOOD PRESSURE: 141 MMHG

## 2025-08-22 DIAGNOSIS — Z86.711 HISTORY OF PULMONARY EMBOLUS (PE): ICD-10-CM

## 2025-08-22 DIAGNOSIS — Z95.1 S/P CABG (CORONARY ARTERY BYPASS GRAFT): Primary | ICD-10-CM

## 2025-08-22 DIAGNOSIS — E78.5 DYSLIPIDEMIA: ICD-10-CM

## 2025-08-22 DIAGNOSIS — N39.0 RECURRENT UTI: ICD-10-CM

## 2025-08-22 PROCEDURE — 1159F MED LIST DOCD IN RCRD: CPT | Performed by: INTERNAL MEDICINE

## 2025-08-22 PROCEDURE — 3077F SYST BP >= 140 MM HG: CPT | Performed by: INTERNAL MEDICINE

## 2025-08-22 PROCEDURE — 99214 OFFICE O/P EST MOD 30 MIN: CPT | Performed by: INTERNAL MEDICINE

## 2025-08-22 PROCEDURE — 99212 OFFICE O/P EST SF 10 MIN: CPT

## 2025-08-22 PROCEDURE — G2211 COMPLEX E/M VISIT ADD ON: HCPCS | Performed by: INTERNAL MEDICINE

## 2025-08-22 PROCEDURE — 3079F DIAST BP 80-89 MM HG: CPT | Performed by: INTERNAL MEDICINE

## 2025-08-25 ENCOUNTER — DOCUMENTATION (OUTPATIENT)
Dept: CARDIAC REHAB | Facility: CLINIC | Age: 80
End: 2025-08-25
Payer: MEDICARE

## 2025-08-26 DIAGNOSIS — Z95.1 S/P CABG (CORONARY ARTERY BYPASS GRAFT): Primary | ICD-10-CM

## 2025-08-28 DIAGNOSIS — Z95.1 S/P CABG (CORONARY ARTERY BYPASS GRAFT): ICD-10-CM

## 2025-08-28 RX ORDER — CLOPIDOGREL BISULFATE 75 MG/1
75 TABLET ORAL DAILY
Qty: 30 TABLET | Refills: 0 | OUTPATIENT
Start: 2025-08-28

## 2025-08-28 RX ORDER — CLOPIDOGREL BISULFATE 75 MG/1
75 TABLET ORAL DAILY
Qty: 90 TABLET | Refills: 3 | Status: SHIPPED | OUTPATIENT
Start: 2025-08-28

## 2025-08-29 DIAGNOSIS — N39.0 RECURRENT UTI: ICD-10-CM

## 2025-09-03 ENCOUNTER — APPOINTMENT (OUTPATIENT)
Dept: CARDIOLOGY | Facility: CLINIC | Age: 80
End: 2025-09-03
Payer: MEDICARE

## 2025-09-08 ENCOUNTER — APPOINTMENT (OUTPATIENT)
Dept: PRIMARY CARE | Facility: CLINIC | Age: 80
End: 2025-09-08
Payer: MEDICARE

## 2025-09-23 ENCOUNTER — APPOINTMENT (OUTPATIENT)
Dept: GASTROENTEROLOGY | Facility: CLINIC | Age: 80
End: 2025-09-23
Payer: MEDICARE

## (undated) DEVICE — SHUNT, INTRACORONARY, 1.75 MM, CLEARVIEW

## (undated) DEVICE — SYRINGE, 60 CC, IRRIGATION, BULB, CONTRO-BULB, PAPER POUCH

## (undated) DEVICE — KNIFE, OPHTHALMIC, SLIT, 22.5 DEG

## (undated) DEVICE — GOWN, SURGICAL, SMARTGOWN, XLARGE, STERILE

## (undated) DEVICE — DEVICE, ENDOSCOPIC VESSEL HARVESTING, SAPHENA VENAPAX

## (undated) DEVICE — DRAPE, FLUID WARMER

## (undated) DEVICE — PACING CABLE, EXTENSION, 12 FT BEIGE, DISPOSABLE

## (undated) DEVICE — DRAPE, SHEET, UTILITY, NON ABSORBENT, 18 X 26 IN, LF

## (undated) DEVICE — ANGIO KIT, LEFT HEART, LF, CUSTOM

## (undated) DEVICE — ELECTRODE, QUICK-COMBO, EDGE SYSTEM, REDI PACK

## (undated) DEVICE — CATHETER, ANGIO, IMPULSE, FL4, 5 FR X 100 CM

## (undated) DEVICE — Device

## (undated) DEVICE — MANIFOLD KIT, CUSTOM, GEAUGA

## (undated) DEVICE — SCREW, SD LOCKING, 2.3 X 13MM: Type: IMPLANTABLE DEVICE | Status: NON-FUNCTIONAL

## (undated) DEVICE — ACCESS SYSTEM, PINNACLE PRECISION, 5FR X 10CM, ECHOGENIC NEEDLE

## (undated) DEVICE — DRESSING, MEPILEX, BORDER, HEEL, 8.7 X 9.1 IN

## (undated) DEVICE — SHUNT, SENSOR

## (undated) DEVICE — OXYGENATOR FX 15, W/HR, ARTERIAL FILTER

## (undated) DEVICE — SHUNT, INTRACORONARY, 1.5 MM, CLEARVIEW

## (undated) DEVICE — SUTURE, PROLENE 4-0, TAPER POINT, SH-1 BLUE 30 INCH

## (undated) DEVICE — ATS SUCTION LINE

## (undated) DEVICE — FILTER, IV, BLOOD, MICROAGGREGATE, 40 MIC, RBC TRANSFUSION

## (undated) DEVICE — SUTURE, PROLENE, 7-0, 30 IN, BV1, DA, BLUE

## (undated) DEVICE — WASH SET, XTRA, 225ML

## (undated) DEVICE — PACING CABLE, EXTENSION, 12 FT BLUE, DISPOSABLE

## (undated) DEVICE — SYRINGE, 20 CC, LUER LOCK, MONOJECT, W/O CAP, LF

## (undated) DEVICE — BLADE, SAW STERNUM, STERILE

## (undated) DEVICE — CATHETER, THORACIC, STRAIGHT, ADULT, 28 FR, PVC

## (undated) DEVICE — DRESSING, ISLAND, TELFA, 4 X 5 IN

## (undated) DEVICE — GEL, ULTRASOUND, AQUASONIC 100, 20 GM, STERILE

## (undated) DEVICE — OXYGENATOR FX 25, W/HR, ARTERIAL FILTER

## (undated) DEVICE — CLIPPER, SURGICAL BLADE ASSEMBLY, GENERAL PURPOSE, SINGLE USE

## (undated) DEVICE — BAG, DECANTER

## (undated) DEVICE — SUTURE, SURGICAL STEEL, STERNUM 7, 18 IN, KV40, SINGL-WIRE

## (undated) DEVICE — BANDAGE, ELASTIC, ACE, ACE, DOUBLE LENGTH, 6 X 550 IN, LF

## (undated) DEVICE — CATHETER, ANGIO, IMPULSE, A MOD, 5 FR X 100 CM

## (undated) DEVICE — COVER, CART, 45 X 27 X 48 IN, CLEAR

## (undated) DEVICE — DRAPE, SHEET, CARDIOVASCULAR, ANTIMICROBIAL, W/ANESTHESIA SCREEN, IOBAN 2, STERI DRAPE, 107 X 133 IN, DISPOSABLE, FABRIC, BLUE, STERILE

## (undated) DEVICE — WASH SET, XTRA, 125ML

## (undated) DEVICE — APPLICATOR, CHLORAPREP, W/ORANGE TINT, 26ML

## (undated) DEVICE — KIT, CELL SAVER, W/COLLECTION SET, 225ML WASH SET

## (undated) DEVICE — MANIFOLD, 4 PORT NEPTUNE STANDARD

## (undated) DEVICE — RETRACTOR, SUTURE, HOLDING, INSERT, OCTOBASE, DISPOSABLE

## (undated) DEVICE — SUTURE, PROLENE, 6-0, 30 IN, C-1, CV-11, BLUE

## (undated) DEVICE — SPONGE, HEMOSTATIC, CELLULOSE, SURGICEL, 2 X 14 IN

## (undated) DEVICE — MAYO TRAY, SMALL

## (undated) DEVICE — KIT, BLOWER / MISTER II

## (undated) DEVICE — TUBE SET, PNEUMOCLEAR, SMOKE EVACU, HIGH-FLOW

## (undated) DEVICE — DRESSING, MEPILEX, BORDER, SACRUM, 8.7 X 9.8 IN

## (undated) DEVICE — KIT, COLLECTION, CARDIO

## (undated) DEVICE — CATHETER, ANGIO, IMPULSE, PIG 145, 5 FR X 110 CM (5 PK)

## (undated) DEVICE — TRAY, SURESTEP, URINE METER, 14FR, SILICONE

## (undated) DEVICE — TUBING, SMOKE EVAC, 3/8 X 10 FT

## (undated) DEVICE — LEAD WIRE, PACING, BIPOLAR, LOW PROFILE

## (undated) DEVICE — PUNCH, AORTIC 4MM

## (undated) DEVICE — POSITONER, URCHIN

## (undated) DEVICE — BATTERY, VARISPEED

## (undated) DEVICE — MARKER, SKIN, RULER AND LABEL PACK, CUSTOM

## (undated) DEVICE — CLOSURE DEVICE, VASCULAR, MYNX, 5FR

## (undated) DEVICE — DRESSING, ADHESIVE, ISLAND, TELFA, 4 X 14 IN

## (undated) DEVICE — TUBING, SUCTION, CONNECTING, STERILE 0.25 X 120 IN., LF

## (undated) DEVICE — SPONGE, LAP, XRAY DECT, 18IN X 18IN, W/MASTER DMT, STERILE

## (undated) DEVICE — SENSOR, OXYGEN, CEREBRAL, SOMASENSOR, ADULT

## (undated) DEVICE — CLEANER, ELECTROSURGICAL, TIP, 5 X 5 CM, LF

## (undated) DEVICE — L-PLATE, LOW PROFILE: Type: IMPLANTABLE DEVICE | Status: NON-FUNCTIONAL

## (undated) DEVICE — 5FR DXTERITY 3DRC DIAGNOSTIC CATHETER, .038 100 CM RADIAL

## (undated) DEVICE — COLLECTION UNIT, DRAINAGE, THORACIC, SINGLE TUBE, DRY SUCTION, ATS COMPATIBLE, OASIS 3600, LF

## (undated) DEVICE — DRESSING, ADHESIVE, ISLAND, TELFA, 2 X 3.75 IN, LF

## (undated) DEVICE — CATHETER, DRAINAGE, NASOGASTRIC, DOUBLE LUMEN, FUNNEL END, SUMP, SALEM, 18 FR, 48 IN, PVC, STERILE

## (undated) DEVICE — SPONGE, GAUZE, XRAY DECT, 16 PLY, 4 X 4, W/MASTER DMT,STERILE